# Patient Record
Sex: MALE | HISPANIC OR LATINO | Employment: OTHER | URBAN - METROPOLITAN AREA
[De-identification: names, ages, dates, MRNs, and addresses within clinical notes are randomized per-mention and may not be internally consistent; named-entity substitution may affect disease eponyms.]

---

## 2021-09-20 PROBLEM — E78.5 HYPERLIPIDEMIA ASSOCIATED WITH TYPE 2 DIABETES MELLITUS (HCC): Status: ACTIVE | Noted: 2021-09-20

## 2021-09-20 PROBLEM — H35.61 RETINAL HEMORRHAGE OF RIGHT EYE: Status: ACTIVE | Noted: 2021-09-20

## 2021-09-20 PROBLEM — E11.69 HYPERLIPIDEMIA ASSOCIATED WITH TYPE 2 DIABETES MELLITUS (HCC): Status: ACTIVE | Noted: 2021-09-20

## 2022-02-02 ENCOUNTER — TELEPHONE (OUTPATIENT)
Dept: VASCULAR LAB | Facility: MEDICAL CENTER | Age: 57
End: 2022-02-02

## 2022-02-02 NOTE — TELEPHONE ENCOUNTER
[facility]    Received anticoag referral for warfarin due to hx of left arm arteriovenous thrombosis from ROD Meade on 1/31/22    Called pt to schedule appt to establish care - no answer. LVM.    Insurance: [facility]  PCP: Non-[facility]  Locations to be seen: [facility]Pharmacotherapy [facility] at [phone], fax [phone]    Di TranD, BCACP

## 2022-02-03 ENCOUNTER — DOCUMENTATION (OUTPATIENT)
Dept: VASCULAR LAB | Facility: MEDICAL CENTER | Age: 57
End: 2022-02-03

## 2022-02-03 NOTE — PROGRESS NOTES
[facility]     Received anticoag referral for warfarin due to hx of left arm arteriovenous thrombosis from ROD Meade on 1/31/22     Called pt to schedule appt to establish care - no answer. Left second voicemail.     Insurance: [facility]  PCP: Non-[facility]  Locations to be seen: [facility]tico[facility]Pharmacotherapy Clinic at [phone], fax [phone]    Raffi Cassidy, DiD

## 2022-02-04 ENCOUNTER — TELEPHONE (OUTPATIENT)
Dept: VASCULAR LAB | Facility: MEDICAL CENTER | Age: 57
End: 2022-02-04

## 2022-02-04 NOTE — TELEPHONE ENCOUNTER
[facility]     Received anticoag referral for warfarin due to hx of left arm arteriovenous thrombosis from ROD Meade on 1/31/22     Called pt to schedule appt to establish care - no answer. Left 3rd voicemail.    Sent letter.     Insurance: [facility]  PCP: Non-[facility]  Locations to be seen: [facility] Anticoagulation/Pharmacotherapy Clinic at [phone], fax [phone]    Joe Phillip, DiD, BCACP

## 2022-02-04 NOTE — LETTER
Zack Gale  [address] [place] [address]    02/04/22      Dear Zack Gale,    We have been unsuccessful in our attempts to contact you regarding your Anticoagulation Service referral.  Warfarin is a potent blood-thinning agent that requires frequent monitoring to measure its level in the blood.  If your level becomes too high, you could develop serious, sometimes life-threatening bleeding problems.  If your level becomes too low, life-threatening blood clots or stroke could result.    To monitor your warfarin effectively, we need to be able to communicate with you.  This is a requirement to be followed by our Service.  If we are unable to contact you through repeated occasions, you are at risk of being discharged from the Anticoagulation Service.     It is extremely important that you have your lab work drawn as soon as possible.  Alternatively, you may schedule an appointment for a fingerstick INR at our clinic.  We are open Monday-Friday 8 am until 5 pm.  You may reach our Service at [phone].        Sincerely,           Patrick Grover, PharmD, [facility]  Clinic Supervisor  [facility]  Outpatient Anticoagulation Service

## 2022-02-07 ENCOUNTER — TELEPHONE (OUTPATIENT)
Dept: VASCULAR LAB | Facility: MEDICAL CENTER | Age: 57
End: 2022-02-07

## 2022-02-07 NOTE — TELEPHONE ENCOUNTER
[facility]     Received anticoag referral for warfarin due to hx of left arm arteriovenous thrombosis from ROD Meade on 1/31/22     4th call   LM on mobile phone  Home phone not in service  LM with emergency contact Neryda     Insurance: [facility]  PCP: Non-[facility]  Locations to be seen: [facility] Anticoagulation/Pharmacotherapy Clinic at [phone], fax [phone]     Frances Heredia, PharmD

## 2022-02-14 ENCOUNTER — TELEPHONE (OUTPATIENT)
Dept: VASCULAR LAB | Facility: MEDICAL CENTER | Age: 57
End: 2022-02-14

## 2022-02-14 NOTE — TELEPHONE ENCOUNTER
[facility]     Received anticoag referral for warfarin due to hx of left arm arteriovenous thrombosis from ROD Meade on 1/31/22     5th call   LM on mobile phone     Insurance: [facility]  PCP: Non-[facility]  Locations to be seen: [facility] Anticoagulation/Pharmacotherapy Clinic at [phone], fax [phone]     Frances Heredia, PharmD

## 2022-02-22 ENCOUNTER — TELEPHONE (OUTPATIENT)
Dept: VASCULAR LAB | Facility: MEDICAL CENTER | Age: 57
End: 2022-02-22
Payer: COMMERCIAL

## 2022-02-22 NOTE — TELEPHONE ENCOUNTER
[facility] Vascular Health and Pharmacotherapy Programs     Received anticoag referral for warfarin due to hx of left arm arteriovenous thrombosis from ROD Meade on 1/31/22     6th call   LM on mobile phone  Will send 2nd letter and await pt contact     Insurance: [facility]  PCP: Non-[facility]  Locations to be seen: [facility] Anticoagulation/Pharmacotherapy Clinic at [phone], fax [phone]     Frances Heredia, PharmD    CC BEBETO Meade.

## 2022-02-22 NOTE — LETTER
Zack Gale  [address] [address]      Dear Zack Gale ,    We have been unsuccessful in our attempts to contact you regarding your Anticoagulation Service referral.  Anticoagulants are medications that can cause potential harmful side effects when not monitored properly. Without proper monitoring there is potential for serious, sometimes life-threatening bleeding problems or life-threatening blood clots or stroke could result.    Please contact our clinic so we may assist you.  We are open Monday-Friday 8 am until 5 pm.  You may reach our Service at [phone].    To monitor your anticoagulant effectively, we need to be able to communicate with you.  This is a requirement to be followed by our Service.  Please contact the clinic as soon as possible to establish care and provide your current contact information.  Thank you.        Sincerely,        Patrick SevillaD, [facility]  Pharmacy Clinical Supervisor  [facility]  Outpatient Anticoagulation Service

## 2022-04-29 PROBLEM — M19.041 OSTEOARTHRITIS OF RIGHT INDEX FINGER: Status: ACTIVE | Noted: 2022-04-29

## 2022-04-29 PROBLEM — R53.83 OTHER FATIGUE: Status: ACTIVE | Noted: 2022-04-29

## 2023-12-01 ENCOUNTER — OFFICE VISIT (OUTPATIENT)
Dept: URGENT CARE | Facility: CLINIC | Age: 58
End: 2023-12-01
Payer: COMMERCIAL

## 2023-12-01 VITALS
WEIGHT: 130 LBS | SYSTOLIC BLOOD PRESSURE: 128 MMHG | HEART RATE: 69 BPM | BODY MASS INDEX: 19.7 KG/M2 | DIASTOLIC BLOOD PRESSURE: 80 MMHG | OXYGEN SATURATION: 97 % | TEMPERATURE: 97.7 F | HEIGHT: 68 IN | RESPIRATION RATE: 15 BRPM

## 2023-12-01 DIAGNOSIS — H10.33 ACUTE BACTERIAL CONJUNCTIVITIS OF BOTH EYES: ICD-10-CM

## 2023-12-01 PROCEDURE — 3079F DIAST BP 80-89 MM HG: CPT | Performed by: PHYSICIAN ASSISTANT

## 2023-12-01 PROCEDURE — 3074F SYST BP LT 130 MM HG: CPT | Performed by: PHYSICIAN ASSISTANT

## 2023-12-01 PROCEDURE — 99203 OFFICE O/P NEW LOW 30 MIN: CPT | Performed by: PHYSICIAN ASSISTANT

## 2023-12-01 RX ORDER — OFLOXACIN 3 MG/ML
1 SOLUTION/ DROPS OPHTHALMIC 4 TIMES DAILY
Qty: 5 ML | Refills: 0 | Status: SHIPPED | OUTPATIENT
Start: 2023-12-01 | End: 2023-12-07

## 2023-12-01 ASSESSMENT — ENCOUNTER SYMPTOMS
EYE DISCHARGE: 1
SINUS PAIN: 0
EYE PAIN: 0
PHOTOPHOBIA: 0
SORE THROAT: 0
FEVER: 0
BLURRED VISION: 0
EYE REDNESS: 1
VOMITING: 0
DOUBLE VISION: 0
HEADACHES: 0
DIZZINESS: 0
COUGH: 0
CHILLS: 0
NAUSEA: 0

## 2023-12-01 ASSESSMENT — FIBROSIS 4 INDEX: FIB4 SCORE: 1.85

## 2023-12-01 NOTE — PROGRESS NOTES
Subjective:     CHIEF COMPLAINT  Chief Complaint   Patient presents with    Conjunctivitis       HPI  Zack Gale is a very pleasant 58 y.o. male who presents to the clinic with bilateral eye redness, irritation and discharge x2 days.  Patient states in the morning he wakes up and his eyes are crusted shut.  Describes purulent discharge and drainage.  Eyes have a gritty sensation.  Denies any eye pain or visual change.  No photophobia.  No recent illness.  No periorbital swelling, redness or tenderness.  No contact lens use.    REVIEW OF SYSTEMS  Review of Systems   Constitutional:  Negative for chills, fever and malaise/fatigue.   HENT:  Negative for congestion, sinus pain and sore throat.    Eyes:  Positive for discharge and redness. Negative for blurred vision, double vision, photophobia and pain.   Respiratory:  Negative for cough.    Gastrointestinal:  Negative for nausea and vomiting.   Skin:  Negative for rash.   Neurological:  Negative for dizziness and headaches.       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    End-stage renal disease on hemodialysis ([facility]) 11/01/2014    S/P CABG x 3 10/31/2014    CAD (coronary artery disease) 10/24/2014    Non-ST elevation myocardial infarction (NSTEMI), initial care episode ([facility]) 10/24/2014    Chronic diarrhea 10/22/2014    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 (Physician Assistant) on 12/01/23 at 1219  Med List Status: <None>     Medication Last Dose Status   atorvastatin (LIPITOR) 20 MG Tab Taking Active   famotidine (PEPCID) 20 MG Tab Taking Active   hydrALAZINE (APRESOLINE) 50 MG Tab Taking Active   insulin glargine (LANTUS) 100 UNIT/ML Solution Taking Active   NIFEdipine (ADALAT CC) 90 MG CR tablet Taking Active   warfarin (COUMADIN) 2 MG Tab Taking Active                    SOCIAL HISTORY  Social History     Tobacco Use    Smoking status: Never    Smokeless tobacco: Never   Substance and Sexual Activity    Alcohol use: Yes     Comment: occassionally    Drug use: No    Sexual activity: Not on file       FAMILY HISTORY  History reviewed. No pertinent family history.       Objective:     VITAL SIGNS: /80 (BP Location: Right arm, Patient Position: Sitting, BP Cuff Size: Adult long)   Pulse 69   Temp 36.5 °C (97.7 °F) (Temporal)   Resp 15   Ht 1.727 m (5' 8\")   Wt 59 kg (130 lb)   SpO2 97%   BMI 19.77 kg/m²     PHYSICAL EXAM  Physical Exam  Constitutional:       Appearance: Normal appearance.   HENT:      Head: Normocephalic and atraumatic.      Right Ear: Tympanic membrane, ear canal and external ear normal.      Left Ear: Tympanic membrane, ear canal and external ear normal.      Nose: Nose normal. No congestion or rhinorrhea.      Mouth/Throat:      Mouth: Mucous membranes are moist.      Pharynx: No oropharyngeal exudate or posterior oropharyngeal erythema.   Eyes:      General:         Right eye: Discharge present.         Left eye: Discharge present.     Extraocular Movements: Extraocular movements intact.      Pupils: Pupils are equal, round, and reactive to light.      Comments: Bilateral conjunctival injected.  Limbus spared.  Purulent discharge in the lid margins.  EOMs full intact and pain-free.  PERRLA.  No " periorbital swelling, redness or tenderness.   Cardiovascular:      Rate and Rhythm: Normal rate and regular rhythm.      Pulses: Normal pulses.      Heart sounds: Normal heart sounds.   Pulmonary:      Effort: Pulmonary effort is normal.      Breath sounds: Normal breath sounds.   Musculoskeletal:      Cervical back: Normal range of motion.   Neurological:      Mental Status: He is alert.         Assessment/Plan:     1. Acute bacterial conjunctivitis of both eyes  - ofloxacin (OCUFLOX) 0.3 % Solution; Administer 1 Drop into both eyes 4 times a day for 7 days.  Dispense: 5 mL; Refill: 0      MDM/Comments:    Warm compresses to affected eye(s) 3 times daily  Hand hygiene discussed  Wash all recently used linens and towels in hot water  Do not touch dropper to eye (s)'    Differential diagnosis, natural history, supportive care, and indications for immediate follow-up discussed. All questions answered. Patient agrees with the plan of care.    Follow-up as needed if symptoms worsen or fail to improve to PCP, Urgent care or Emergency Room.    I have personally reviewed prior external notes and test results pertinent to today's visit.  I have independently reviewed and interpreted all diagnostics ordered during this urgent care acute visit.   Discussed management options (risks,benefits, and alternatives to treatment). Pt expresses understanding and the treatment plan was agreed upon. Questions were encouraged and answered to pt's satisfaction.    Please note that this dictation was created using voice recognition software. I have made a reasonable attempt to correct obvious errors, but I expect that there are errors of grammar and possibly content that I did not discover before finalizing the note.

## 2023-12-07 PROBLEM — M19.041 OSTEOARTHRITIS OF RIGHT INDEX FINGER: Chronic | Status: ACTIVE | Noted: 2022-04-29

## 2023-12-07 PROBLEM — Z79.4 ENCOUNTER FOR LONG-TERM (CURRENT) USE OF INSULIN (HCC): Status: ACTIVE | Noted: 2023-12-07

## 2023-12-07 PROBLEM — I77.0 ARTERIOVENOUS FISTULA, ACQUIRED (HCC): Status: ACTIVE | Noted: 2023-12-07

## 2023-12-07 PROBLEM — E78.5 HYPERLIPIDEMIA ASSOCIATED WITH TYPE 2 DIABETES MELLITUS (HCC): Chronic | Status: ACTIVE | Noted: 2021-09-20

## 2023-12-07 PROBLEM — R68.2 DRY MOUTH: Status: ACTIVE | Noted: 2023-12-07

## 2023-12-07 PROBLEM — E11.3553 STABLE PROLIFERATIVE DIABETIC RETINOPATHY OF BOTH EYES ASSOCIATED WITH TYPE 2 DIABETES MELLITUS (HCC): Chronic | Status: ACTIVE | Noted: 2023-12-07

## 2023-12-07 PROBLEM — E11.3553 STABLE PROLIFERATIVE DIABETIC RETINOPATHY OF BOTH EYES ASSOCIATED WITH TYPE 2 DIABETES MELLITUS (HCC): Status: ACTIVE | Noted: 2023-12-07

## 2023-12-07 PROBLEM — E11.69 HYPERLIPIDEMIA ASSOCIATED WITH TYPE 2 DIABETES MELLITUS (HCC): Chronic | Status: ACTIVE | Noted: 2021-09-20

## 2023-12-07 PROBLEM — G47.01 INSOMNIA DUE TO MEDICAL CONDITION: Status: ACTIVE | Noted: 2023-12-07

## 2024-03-25 ENCOUNTER — APPOINTMENT (OUTPATIENT)
Dept: URGENT CARE | Facility: CLINIC | Age: 59
End: 2024-03-25
Payer: COMMERCIAL

## 2024-03-27 ENCOUNTER — HOSPITAL ENCOUNTER (EMERGENCY)
Facility: MEDICAL CENTER | Age: 59
End: 2024-03-27
Attending: EMERGENCY MEDICINE
Payer: COMMERCIAL

## 2024-03-27 VITALS
WEIGHT: 118.61 LBS | DIASTOLIC BLOOD PRESSURE: 65 MMHG | OXYGEN SATURATION: 98 % | HEIGHT: 68 IN | RESPIRATION RATE: 16 BRPM | HEART RATE: 72 BPM | SYSTOLIC BLOOD PRESSURE: 142 MMHG | TEMPERATURE: 98.8 F | BODY MASS INDEX: 17.98 KG/M2

## 2024-03-27 DIAGNOSIS — H66.002 NON-RECURRENT ACUTE SUPPURATIVE OTITIS MEDIA OF LEFT EAR WITHOUT SPONTANEOUS RUPTURE OF TYMPANIC MEMBRANE: ICD-10-CM

## 2024-03-27 PROCEDURE — 99282 EMERGENCY DEPT VISIT SF MDM: CPT

## 2024-03-27 RX ORDER — CEFDINIR 300 MG/1
300 CAPSULE ORAL 2 TIMES DAILY
Qty: 20 CAPSULE | Refills: 0 | Status: ACTIVE | OUTPATIENT
Start: 2024-03-27 | End: 2024-04-06

## 2024-03-27 ASSESSMENT — PAIN DESCRIPTION - PAIN TYPE: TYPE: ACUTE PAIN

## 2024-03-27 ASSESSMENT — FIBROSIS 4 INDEX: FIB4 SCORE: 1.88

## 2024-03-27 NOTE — ED PROVIDER NOTES
ER Provider Note    Scribed for Arsh Baker M.D. by Loc Paul. 3/27/2024   10:46 AM    Primary Care Provider: RICHIE Meade    CHIEF COMPLAINT  Chief Complaint   Patient presents with    Ear Pain     Left ear, x 1 week    Cough     Denies fevers or other flu like symptoms.      EXTERNAL RECORDS REVIEWED  Outpatient Notes shows that the patient was previously anticoagulated on Warfarin for several years, but stopped in 2022. No recent ER visits.      HPI/ROS  LIMITATION TO HISTORY   Select: : None  OUTSIDE HISTORIAN(S):  None    Zack Gale is a 59 y.o. male who presents to the ED for evaluation of acute left ear pain onset five days ago. Right ear has no pain to it. Patient additionally complains of a cough. Denies any fevers or congestion. No alleviating or exacerbating factors reported. No known drug allergies.      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 No pertinent surgical history.    FAMILY HISTORY  History reviewed. No pertinent family history.    SOCIAL HISTORY   reports that he has never smoked. He has never used smokeless tobacco. He reports current alcohol use. He reports that he does not use drugs.    CURRENT MEDICATIONS  Previous Medications    AMLODIPINE (NORVASC) 10 MG TAB    Take 10 mg by mouth at bedtime.    FAMOTIDINE (PEPCID) 20 MG TAB    Take 1 Tablet by mouth at bedtime.    INSULIN GLARGINE (LANTUS) 100 UNIT/ML SOLUTION    Inject 4 Units under the skin every evening.    LOSARTAN (COZAAR) 50 MG TAB    Take 50 mg by mouth at bedtime.    ROSUVASTATIN (CRESTOR) 20 MG TAB    Take 1 Tablet by mouth every evening.       ALLERGIES  No Known Allergies     PHYSICAL EXAM  BP (!)  "119/34   Pulse 70   Temp 37.1 °C (98.8 °F) (Temporal)   Resp 16   Ht 1.727 m (5' 8\")   Wt 53.8 kg (118 lb 9.7 oz)   SpO2 99%   BMI 18.03 kg/m²      Nursing note and vitals reviewed.  Constitutional: Well-developed and well-nourished. No distress.   HENT: Head is normocephalic and atraumatic. Left TM is erythematous and bulging. Oropharynx is clear and moist without exudate or erythema.   Eyes: Pupils are equal, round, and reactive to light. Conjunctiva are normal.   Cardiovascular: Normal rate and regular rhythm. No murmur heard. Normal radial pulses.  Pulmonary/Chest: Breath sounds normal. No wheezes or rales.   Abdominal: Soft and non-tender. No distention    Musculoskeletal: Extremities exhibit normal range of motion without edema or tenderness.   Neurological: Awake, alert and oriented to person, place, and time. No focal deficits noted.  Skin: Skin is warm and dry. No rash.   Psychiatric: Normal mood and affect. Appropriate for clinical situation      INITIAL ASSESSMENT AND PLAN    10:46 AM - Patient was seen and evaluated at bedside. Patient presents to the ED for an ear pain with cough. After my exam, I discussed with the patient the plan of care, which includes treating the patient with medication for their symptoms. I explained to him that I will send him home with a prescription for antibiotics. Patient understands and verbalizes agreement to plan of care. They had the opportunity to ask questions. No further questions or concerns at this time. I then informed the patient of my plan for discharge, which includes strict return precautions for any new or worsening symptoms. Patient understands and verbalizes agreement to plan of care. Patient is comfortable going home at this time.      ED Observation Status? No; Patient does not meet criteria for ED Observation.        COURSE AND MEDICAL DECISION MAKING    The patient presents today with left ear pain for the past five days. Exam reveals left TM is " erythematous and bulging. I will send the patient home with a prescription for cefdinir.      HTN/IDDM FOLLOW UP:  The patient is referred to a primary physician for blood pressure management, diabetic screening, and for all other preventive health concerns      DISPOSITION AND DISCUSSIONS    I have discussed management of the patient with the following physicians and ITZEL's:  None    Discussion of management with other QHP or appropriate source(s): None     Decision tools and prescription drugs considered including, but not limited to: Antibiotics : Omnicef to help treat his infection .    Patient will be discharged home.    FOLLOW UP:  RICHIE Meade  [address] [address]  [phone]    Schedule an appointment as soon as possible for a visit       [facility], Emergency Dept  [address] [facility] [address] [phone]    If symptoms worsen      OUTPATIENT MEDICATIONS:  New Prescriptions    CEFDINIR (OMNICEF) 300 MG CAP    Take 1 Capsule by mouth 2 times a day for 10 days.     FINAL DIAGNOSIS  1. Non-recurrent acute suppurative otitis media of left ear without spontaneous rupture of tympanic membrane         Loc GRAF (Scraurea), am scribing for, and in the presence of, Arsh Baker M.D..    Electronically signed by: Loc Paul (Isaiah), 3/27/2024    Arsh GRAF M.D. personally performed the services described in this documentation, as scribed by Loc Paul in my presence, and it is both accurate and complete.      The note accurately reflects work and decisions made by me.  Arsh Baker M.D.  3/27/2024  4:16 PM

## 2024-03-27 NOTE — ED TRIAGE NOTES
.  Chief Complaint   Patient presents with    Ear Pain     Left ear, x 1 week    Cough     Denies fevers or other flu like symptoms.       Pt ambulate to triage with above complaints.   Pt educated on triage process and returned to [facility].

## 2024-03-27 NOTE — ED NOTES
Pt discharged to home. Pt was given follow up instructions and prescriptions for cefdinir. Pt verbalized understanding of all instructions for discharge and is ambulatory out of ED with steady gait. AOx4

## 2024-08-29 ENCOUNTER — HOSPITAL ENCOUNTER (INPATIENT)
Facility: MEDICAL CENTER | Age: 59
End: 2024-08-29
Attending: STUDENT IN AN ORGANIZED HEALTH CARE EDUCATION/TRAINING PROGRAM | Admitting: STUDENT IN AN ORGANIZED HEALTH CARE EDUCATION/TRAINING PROGRAM
Payer: COMMERCIAL

## 2024-08-29 DIAGNOSIS — J96.01 ACUTE HYPOXEMIC RESPIRATORY FAILURE DUE TO COVID-19 (HCC): ICD-10-CM

## 2024-08-29 DIAGNOSIS — E87.5 HYPERKALEMIA: ICD-10-CM

## 2024-08-29 DIAGNOSIS — R00.0 TACHYCARDIA: ICD-10-CM

## 2024-08-29 DIAGNOSIS — U07.1 COVID: ICD-10-CM

## 2024-08-29 DIAGNOSIS — U07.1 ACUTE HYPOXEMIC RESPIRATORY FAILURE DUE TO COVID-19 (HCC): ICD-10-CM

## 2024-08-29 PROCEDURE — 99285 EMERGENCY DEPT VISIT HI MDM: CPT

## 2024-08-29 PROCEDURE — 0241U HCHG SARS-COV-2 COVID-19 NFCT DS RESP RNA 4 TRGT ED POC: CPT

## 2024-08-29 ASSESSMENT — PAIN DESCRIPTION - PAIN TYPE: TYPE: ACUTE PAIN

## 2024-08-30 ENCOUNTER — APPOINTMENT (OUTPATIENT)
Dept: RADIOLOGY | Facility: MEDICAL CENTER | Age: 59
DRG: 640 | End: 2024-08-30
Attending: STUDENT IN AN ORGANIZED HEALTH CARE EDUCATION/TRAINING PROGRAM
Payer: COMMERCIAL

## 2024-08-30 PROBLEM — U07.1 COVID-19 VIRUS INFECTION: Status: ACTIVE | Noted: 2024-08-30

## 2024-08-30 PROBLEM — E87.5 HYPERKALEMIA: Status: ACTIVE | Noted: 2024-08-30

## 2024-08-30 LAB
ALBUMIN SERPL BCP-MCNC: 4.4 G/DL (ref 3.2–4.9)
ALBUMIN/GLOB SERPL: 1.2 G/DL
ALP SERPL-CCNC: 175 U/L (ref 30–99)
ALT SERPL-CCNC: 10 U/L (ref 2–50)
ANION GAP SERPL CALC-SCNC: 19 MMOL/L (ref 7–16)
ANION GAP SERPL CALC-SCNC: 20 MMOL/L (ref 7–16)
ANION GAP SERPL CALC-SCNC: 21 MMOL/L (ref 7–16)
AST SERPL-CCNC: 19 U/L (ref 12–45)
BASOPHILS # BLD AUTO: 0.8 % (ref 0–1.8)
BASOPHILS # BLD: 0.05 K/UL (ref 0–0.12)
BILIRUB SERPL-MCNC: 0.5 MG/DL (ref 0.1–1.5)
BUN SERPL-MCNC: 53 MG/DL (ref 8–22)
BUN SERPL-MCNC: 54 MG/DL (ref 8–22)
BUN SERPL-MCNC: 58 MG/DL (ref 8–22)
CALCIUM ALBUM COR SERPL-MCNC: 8.7 MG/DL (ref 8.5–10.5)
CALCIUM SERPL-MCNC: 8.5 MG/DL (ref 8.5–10.5)
CALCIUM SERPL-MCNC: 9 MG/DL (ref 8.5–10.5)
CALCIUM SERPL-MCNC: 9.1 MG/DL (ref 8.5–10.5)
CHLORIDE SERPL-SCNC: 90 MMOL/L (ref 96–112)
CHLORIDE SERPL-SCNC: 91 MMOL/L (ref 96–112)
CHLORIDE SERPL-SCNC: 94 MMOL/L (ref 96–112)
CO2 SERPL-SCNC: 20 MMOL/L (ref 20–33)
CO2 SERPL-SCNC: 21 MMOL/L (ref 20–33)
CO2 SERPL-SCNC: 23 MMOL/L (ref 20–33)
CREAT SERPL-MCNC: 9.45 MG/DL (ref 0.5–1.4)
CREAT SERPL-MCNC: 9.77 MG/DL (ref 0.5–1.4)
CREAT SERPL-MCNC: 9.89 MG/DL (ref 0.5–1.4)
EKG IMPRESSION: NORMAL
EOSINOPHIL # BLD AUTO: 0.01 K/UL (ref 0–0.51)
EOSINOPHIL NFR BLD: 0.2 % (ref 0–6.9)
ERYTHROCYTE [DISTWIDTH] IN BLOOD BY AUTOMATED COUNT: 51.4 FL (ref 35.9–50)
FLUAV RNA SPEC QL NAA+PROBE: NEGATIVE
FLUBV RNA SPEC QL NAA+PROBE: NEGATIVE
GFR SERPLBLD CREATININE-BSD FMLA CKD-EPI: 6 ML/MIN/1.73 M 2
GLOBULIN SER CALC-MCNC: 3.6 G/DL (ref 1.9–3.5)
GLUCOSE BLD STRIP.AUTO-MCNC: 154 MG/DL (ref 65–99)
GLUCOSE BLD STRIP.AUTO-MCNC: 324 MG/DL (ref 65–99)
GLUCOSE BLD STRIP.AUTO-MCNC: 58 MG/DL (ref 65–99)
GLUCOSE BLD STRIP.AUTO-MCNC: 65 MG/DL (ref 65–99)
GLUCOSE BLD STRIP.AUTO-MCNC: 91 MG/DL (ref 65–99)
GLUCOSE SERPL-MCNC: 155 MG/DL (ref 65–99)
GLUCOSE SERPL-MCNC: 51 MG/DL (ref 65–99)
GLUCOSE SERPL-MCNC: 78 MG/DL (ref 65–99)
HBV CORE AB SERPL QL IA: NONREACTIVE
HBV CORE IGM SER QL: NORMAL
HBV SURFACE AB SERPL IA-ACNC: 432 MIU/ML (ref 0–10)
HBV SURFACE AG SER QL: NORMAL
HCT VFR BLD AUTO: 37.7 % (ref 42–52)
HGB BLD-MCNC: 12.3 G/DL (ref 14–18)
IMM GRANULOCYTES # BLD AUTO: 0.02 K/UL (ref 0–0.11)
IMM GRANULOCYTES NFR BLD AUTO: 0.3 % (ref 0–0.9)
LYMPHOCYTES # BLD AUTO: 1.32 K/UL (ref 1–4.8)
LYMPHOCYTES NFR BLD: 20.4 % (ref 22–41)
MAGNESIUM SERPL-MCNC: 2.9 MG/DL (ref 1.5–2.5)
MCH RBC QN AUTO: 28.5 PG (ref 27–33)
MCHC RBC AUTO-ENTMCNC: 32.6 G/DL (ref 32.3–36.5)
MCV RBC AUTO: 87.3 FL (ref 81.4–97.8)
MONOCYTES # BLD AUTO: 0.75 K/UL (ref 0–0.85)
MONOCYTES NFR BLD AUTO: 11.6 % (ref 0–13.4)
NEUTROPHILS # BLD AUTO: 4.33 K/UL (ref 1.82–7.42)
NEUTROPHILS NFR BLD: 66.7 % (ref 44–72)
NRBC # BLD AUTO: 0 K/UL
NRBC BLD-RTO: 0 /100 WBC (ref 0–0.2)
PHOSPHATE SERPL-MCNC: 5.1 MG/DL (ref 2.5–4.5)
PLATELET # BLD AUTO: 155 K/UL (ref 164–446)
PMV BLD AUTO: 10.3 FL (ref 9–12.9)
POTASSIUM SERPL-SCNC: 4.4 MMOL/L (ref 3.6–5.5)
POTASSIUM SERPL-SCNC: 4.7 MMOL/L (ref 3.6–5.5)
POTASSIUM SERPL-SCNC: 6.1 MMOL/L (ref 3.6–5.5)
PROT SERPL-MCNC: 8 G/DL (ref 6–8.2)
RBC # BLD AUTO: 4.32 M/UL (ref 4.7–6.1)
RSV RNA SPEC QL NAA+PROBE: NEGATIVE
SARS-COV-2 RNA RESP QL NAA+PROBE: DETECTED
SODIUM SERPL-SCNC: 132 MMOL/L (ref 135–145)
SODIUM SERPL-SCNC: 133 MMOL/L (ref 135–145)
SODIUM SERPL-SCNC: 134 MMOL/L (ref 135–145)
WBC # BLD AUTO: 6.5 K/UL (ref 4.8–10.8)

## 2024-08-30 PROCEDURE — 80053 COMPREHEN METABOLIC PANEL: CPT

## 2024-08-30 PROCEDURE — 700111 HCHG RX REV CODE 636 W/ 250 OVERRIDE (IP): Performed by: STUDENT IN AN ORGANIZED HEALTH CARE EDUCATION/TRAINING PROGRAM

## 2024-08-30 PROCEDURE — 700102 HCHG RX REV CODE 250 W/ 637 OVERRIDE(OP): Mod: UD | Performed by: STUDENT IN AN ORGANIZED HEALTH CARE EDUCATION/TRAINING PROGRAM

## 2024-08-30 PROCEDURE — A9270 NON-COVERED ITEM OR SERVICE: HCPCS | Performed by: STUDENT IN AN ORGANIZED HEALTH CARE EDUCATION/TRAINING PROGRAM

## 2024-08-30 PROCEDURE — 87340 HEPATITIS B SURFACE AG IA: CPT

## 2024-08-30 PROCEDURE — 36415 COLL VENOUS BLD VENIPUNCTURE: CPT

## 2024-08-30 PROCEDURE — 90935 HEMODIALYSIS ONE EVALUATION: CPT

## 2024-08-30 PROCEDURE — 770020 HCHG ROOM/CARE - TELE (206)

## 2024-08-30 PROCEDURE — 700111 HCHG RX REV CODE 636 W/ 250 OVERRIDE (IP): Mod: JZ,JG,UD | Performed by: STUDENT IN AN ORGANIZED HEALTH CARE EDUCATION/TRAINING PROGRAM

## 2024-08-30 PROCEDURE — 96372 THER/PROPH/DIAG INJ SC/IM: CPT

## 2024-08-30 PROCEDURE — 85025 COMPLETE CBC W/AUTO DIFF WBC: CPT

## 2024-08-30 PROCEDURE — 99223 1ST HOSP IP/OBS HIGH 75: CPT | Mod: AI | Performed by: STUDENT IN AN ORGANIZED HEALTH CARE EDUCATION/TRAINING PROGRAM

## 2024-08-30 PROCEDURE — 74176 CT ABD & PELVIS W/O CONTRAST: CPT

## 2024-08-30 PROCEDURE — 700111 HCHG RX REV CODE 636 W/ 250 OVERRIDE (IP): Performed by: INTERNAL MEDICINE

## 2024-08-30 PROCEDURE — 86704 HEP B CORE ANTIBODY TOTAL: CPT

## 2024-08-30 PROCEDURE — 84100 ASSAY OF PHOSPHORUS: CPT

## 2024-08-30 PROCEDURE — 700102 HCHG RX REV CODE 250 W/ 637 OVERRIDE(OP): Performed by: STUDENT IN AN ORGANIZED HEALTH CARE EDUCATION/TRAINING PROGRAM

## 2024-08-30 PROCEDURE — 80048 BASIC METABOLIC PNL TOTAL CA: CPT | Mod: 91

## 2024-08-30 PROCEDURE — 86705 HEP B CORE ANTIBODY IGM: CPT

## 2024-08-30 PROCEDURE — 700111 HCHG RX REV CODE 636 W/ 250 OVERRIDE (IP)

## 2024-08-30 PROCEDURE — 96375 TX/PRO/DX INJ NEW DRUG ADDON: CPT

## 2024-08-30 PROCEDURE — 83735 ASSAY OF MAGNESIUM: CPT

## 2024-08-30 PROCEDURE — 96365 THER/PROPH/DIAG IV INF INIT: CPT

## 2024-08-30 PROCEDURE — 93005 ELECTROCARDIOGRAM TRACING: CPT | Performed by: STUDENT IN AN ORGANIZED HEALTH CARE EDUCATION/TRAINING PROGRAM

## 2024-08-30 PROCEDURE — A9270 NON-COVERED ITEM OR SERVICE: HCPCS | Mod: UD | Performed by: STUDENT IN AN ORGANIZED HEALTH CARE EDUCATION/TRAINING PROGRAM

## 2024-08-30 PROCEDURE — 99223 1ST HOSP IP/OBS HIGH 75: CPT | Performed by: INTERNAL MEDICINE

## 2024-08-30 PROCEDURE — 700101 HCHG RX REV CODE 250: Mod: UD | Performed by: STUDENT IN AN ORGANIZED HEALTH CARE EDUCATION/TRAINING PROGRAM

## 2024-08-30 PROCEDURE — 94644 CONT INHLJ TX 1ST HOUR: CPT

## 2024-08-30 PROCEDURE — 82962 GLUCOSE BLOOD TEST: CPT | Mod: 91

## 2024-08-30 PROCEDURE — 86706 HEP B SURFACE ANTIBODY: CPT

## 2024-08-30 RX ORDER — ACETAMINOPHEN 325 MG/1
650 TABLET ORAL EVERY 6 HOURS PRN
Status: DISCONTINUED | OUTPATIENT
Start: 2024-08-30 | End: 2024-09-01 | Stop reason: HOSPADM

## 2024-08-30 RX ORDER — DEXTROSE MONOHYDRATE 25 G/50ML
25 INJECTION, SOLUTION INTRAVENOUS ONCE
Status: COMPLETED | OUTPATIENT
Start: 2024-08-30 | End: 2024-08-30

## 2024-08-30 RX ORDER — ASPIRIN 81 MG/1
81 TABLET ORAL DAILY
Status: DISCONTINUED | OUTPATIENT
Start: 2024-08-30 | End: 2024-09-01 | Stop reason: HOSPADM

## 2024-08-30 RX ORDER — ONDANSETRON 4 MG/1
4 TABLET, ORALLY DISINTEGRATING ORAL ONCE
Status: COMPLETED | OUTPATIENT
Start: 2024-08-30 | End: 2024-08-30

## 2024-08-30 RX ORDER — NIFEDIPINE 90 MG/1
90 TABLET, EXTENDED RELEASE ORAL DAILY
COMMUNITY

## 2024-08-30 RX ORDER — HEPARIN SODIUM 5000 [USP'U]/ML
5000 INJECTION, SOLUTION INTRAVENOUS; SUBCUTANEOUS EVERY 8 HOURS
Status: DISCONTINUED | OUTPATIENT
Start: 2024-08-30 | End: 2024-08-31

## 2024-08-30 RX ORDER — AMLODIPINE BESYLATE 5 MG/1
10 TABLET ORAL ONCE
Status: COMPLETED | OUTPATIENT
Start: 2024-08-30 | End: 2024-08-30

## 2024-08-30 RX ORDER — CALCIUM GLUCONATE 20 MG/ML
2 INJECTION, SOLUTION INTRAVENOUS ONCE
Status: COMPLETED | OUTPATIENT
Start: 2024-08-30 | End: 2024-08-30

## 2024-08-30 RX ORDER — LABETALOL HYDROCHLORIDE 5 MG/ML
10 INJECTION, SOLUTION INTRAVENOUS EVERY 4 HOURS PRN
Status: DISCONTINUED | OUTPATIENT
Start: 2024-08-30 | End: 2024-09-01 | Stop reason: HOSPADM

## 2024-08-30 RX ORDER — ACETAMINOPHEN 325 MG/1
975 TABLET ORAL ONCE
Status: COMPLETED | OUTPATIENT
Start: 2024-08-30 | End: 2024-08-30

## 2024-08-30 RX ORDER — NIFEDIPINE 30 MG/1
90 TABLET, EXTENDED RELEASE ORAL DAILY
Status: DISCONTINUED | OUTPATIENT
Start: 2024-08-30 | End: 2024-09-01 | Stop reason: HOSPADM

## 2024-08-30 RX ORDER — DEXTROSE MONOHYDRATE 25 G/50ML
50 INJECTION, SOLUTION INTRAVENOUS PRN
Status: DISCONTINUED | OUTPATIENT
Start: 2024-08-30 | End: 2024-09-01 | Stop reason: HOSPADM

## 2024-08-30 RX ORDER — HEPARIN SODIUM 1000 [USP'U]/ML
500 INJECTION, SOLUTION INTRAVENOUS; SUBCUTANEOUS
Status: DISCONTINUED | OUTPATIENT
Start: 2024-08-30 | End: 2024-09-01 | Stop reason: HOSPADM

## 2024-08-30 RX ORDER — LOSARTAN POTASSIUM 50 MG/1
50 TABLET ORAL ONCE
Status: COMPLETED | OUTPATIENT
Start: 2024-08-30 | End: 2024-08-30

## 2024-08-30 RX ORDER — SODIUM CHLORIDE 9 MG/ML
100 INJECTION, SOLUTION INTRAVENOUS
Status: DISCONTINUED | OUTPATIENT
Start: 2024-08-30 | End: 2024-09-01 | Stop reason: HOSPADM

## 2024-08-30 RX ORDER — HEPARIN SODIUM 1000 [USP'U]/ML
INJECTION, SOLUTION INTRAVENOUS; SUBCUTANEOUS
Status: COMPLETED
Start: 2024-08-30 | End: 2024-08-30

## 2024-08-30 RX ADMIN — NIFEDIPINE 90 MG: 30 TABLET, EXTENDED RELEASE ORAL at 21:13

## 2024-08-30 RX ADMIN — SODIUM ZIRCONIUM CYCLOSILICATE 10 G: 10 POWDER, FOR SUSPENSION ORAL at 04:40

## 2024-08-30 RX ADMIN — CALCIUM GLUCONATE 2 G: 20 INJECTION, SOLUTION INTRAVENOUS at 02:40

## 2024-08-30 RX ADMIN — Medication 15 MG/HR: at 02:31

## 2024-08-30 RX ADMIN — HEPARIN SODIUM 5000 UNITS: 5000 INJECTION, SOLUTION INTRAVENOUS; SUBCUTANEOUS at 06:01

## 2024-08-30 RX ADMIN — HEPARIN SODIUM 500 UNITS: 1000 INJECTION, SOLUTION INTRAVENOUS; SUBCUTANEOUS at 11:25

## 2024-08-30 RX ADMIN — ONDANSETRON 4 MG: 4 TABLET, ORALLY DISINTEGRATING ORAL at 00:43

## 2024-08-30 RX ADMIN — ASPIRIN 81 MG: 81 TABLET, COATED ORAL at 06:01

## 2024-08-30 RX ADMIN — ACETAMINOPHEN 975 MG: 325 TABLET ORAL at 00:43

## 2024-08-30 RX ADMIN — HEPARIN SODIUM 5000 UNITS: 5000 INJECTION, SOLUTION INTRAVENOUS; SUBCUTANEOUS at 16:01

## 2024-08-30 RX ADMIN — LOSARTAN POTASSIUM 50 MG: 50 TABLET, FILM COATED ORAL at 02:01

## 2024-08-30 RX ADMIN — AMLODIPINE BESYLATE 10 MG: 5 TABLET ORAL at 02:01

## 2024-08-30 RX ADMIN — DEXTROSE MONOHYDRATE 25 G: 25 INJECTION, SOLUTION INTRAVENOUS at 02:31

## 2024-08-30 RX ADMIN — HEPARIN SODIUM 500 UNITS: 1000 INJECTION, SOLUTION INTRAVENOUS; SUBCUTANEOUS at 11:26

## 2024-08-30 RX ADMIN — INSULIN HUMAN 10 UNITS: 100 INJECTION, SOLUTION PARENTERAL at 02:36

## 2024-08-30 RX ADMIN — HEPARIN SODIUM 5000 UNITS: 5000 INJECTION, SOLUTION INTRAVENOUS; SUBCUTANEOUS at 21:13

## 2024-08-30 SDOH — ECONOMIC STABILITY: TRANSPORTATION INSECURITY
IN THE PAST 12 MONTHS, HAS LACK OF RELIABLE TRANSPORTATION KEPT YOU FROM MEDICAL APPOINTMENTS, MEETINGS, WORK OR FROM GETTING THINGS NEEDED FOR DAILY LIVING?: NO

## 2024-08-30 SDOH — ECONOMIC STABILITY: TRANSPORTATION INSECURITY
IN THE PAST 12 MONTHS, HAS THE LACK OF TRANSPORTATION KEPT YOU FROM MEDICAL APPOINTMENTS OR FROM GETTING MEDICATIONS?: NO

## 2024-08-30 ASSESSMENT — ENCOUNTER SYMPTOMS
MYALGIAS: 1
SHORTNESS OF BREATH: 0
DIARRHEA: 0
CHILLS: 1
ABDOMINAL PAIN: 0
VOMITING: 0
FEVER: 1
COUGH: 0
NAUSEA: 0

## 2024-08-30 ASSESSMENT — LIFESTYLE VARIABLES
HAVE PEOPLE ANNOYED YOU BY CRITICIZING YOUR DRINKING: NO
HOW MANY TIMES IN THE PAST YEAR HAVE YOU HAD 5 OR MORE DRINKS IN A DAY: 0
ALCOHOL_USE: NO
DOES PATIENT WANT TO STOP DRINKING: CANNOT ASSESS
TOTAL SCORE: 0
TOTAL SCORE: 0
EVER HAD A DRINK FIRST THING IN THE MORNING TO STEADY YOUR NERVES TO GET RID OF A HANGOVER: NO
CONSUMPTION TOTAL: NEGATIVE
TOTAL SCORE: 0
AVERAGE NUMBER OF DAYS PER WEEK YOU HAVE A DRINK CONTAINING ALCOHOL: 0
ON A TYPICAL DAY WHEN YOU DRINK ALCOHOL HOW MANY DRINKS DO YOU HAVE: 0
HAVE YOU EVER FELT YOU SHOULD CUT DOWN ON YOUR DRINKING: NO
EVER FELT BAD OR GUILTY ABOUT YOUR DRINKING: NO

## 2024-08-30 ASSESSMENT — SOCIAL DETERMINANTS OF HEALTH (SDOH)
WITHIN THE LAST YEAR, HAVE TO BEEN RAPED OR FORCED TO HAVE ANY KIND OF SEXUAL ACTIVITY BY YOUR PARTNER OR EX-PARTNER?: NO
WITHIN THE PAST 12 MONTHS, YOU WORRIED THAT YOUR FOOD WOULD RUN OUT BEFORE YOU GOT THE MONEY TO BUY MORE: NEVER TRUE
WITHIN THE PAST 12 MONTHS, THE FOOD YOU BOUGHT JUST DIDN'T LAST AND YOU DIDN'T HAVE MONEY TO GET MORE: NEVER TRUE
IN THE PAST 12 MONTHS, HAS THE ELECTRIC, GAS, OIL, OR WATER COMPANY THREATENED TO SHUT OFF SERVICE IN YOUR HOME?: NO
WITHIN THE LAST YEAR, HAVE YOU BEEN HUMILIATED OR EMOTIONALLY ABUSED IN OTHER WAYS BY YOUR PARTNER OR EX-PARTNER?: NO
WITHIN THE LAST YEAR, HAVE YOU BEEN KICKED, HIT, SLAPPED, OR OTHERWISE PHYSICALLY HURT BY YOUR PARTNER OR EX-PARTNER?: NO
WITHIN THE LAST YEAR, HAVE YOU BEEN AFRAID OF YOUR PARTNER OR EX-PARTNER?: NO

## 2024-08-30 ASSESSMENT — COGNITIVE AND FUNCTIONAL STATUS - GENERAL
SUGGESTED CMS G CODE MODIFIER MOBILITY: CJ
DAILY ACTIVITIY SCORE: 21
MOBILITY SCORE: 22
DRESSING REGULAR LOWER BODY CLOTHING: A LITTLE
TOILETING: A LITTLE
WALKING IN HOSPITAL ROOM: A LITTLE
SUGGESTED CMS G CODE MODIFIER DAILY ACTIVITY: CJ
CLIMB 3 TO 5 STEPS WITH RAILING: A LITTLE
PERSONAL GROOMING: A LITTLE

## 2024-08-30 ASSESSMENT — PATIENT HEALTH QUESTIONNAIRE - PHQ9
SUM OF ALL RESPONSES TO PHQ9 QUESTIONS 1 AND 2: 1
2. FEELING DOWN, DEPRESSED, IRRITABLE, OR HOPELESS: SEVERAL DAYS
1. LITTLE INTEREST OR PLEASURE IN DOING THINGS: NOT AT ALL

## 2024-08-30 ASSESSMENT — PAIN DESCRIPTION - PAIN TYPE: TYPE: ACUTE PAIN

## 2024-08-30 NOTE — CARE PLAN
The patient is Stable - Low risk of patient condition declining or worsening    Shift Goals  Clinical Goals: dialysis and safety  Patient Goals: rest and comfort  Family Goals: ASH    Progress made toward(s) clinical / shift goals:      Problem: Pain - Standard  Goal: Alleviation of pain or a reduction in pain to the patient’s comfort goal  8/30/2024 1246 by Tana Gotti R.N.  Outcome: Progressing       Problem: Skin Integrity  Goal: Skin integrity is maintained or improved  8/30/2024 1246 by Tana Gotti R.N.  Outcome: Progressing       Problem: Fall Risk  Goal: Patient will remain free from falls  8/30/2024 1246 by Tana Gotti R.N.  Outcome: Progressing       Problem: Knowledge Deficit with Chronic Kidney Disease process  Goal: Patient will maintain/demonstrate improvement in lab values  Outcome: Progressing     Patient is not progressing towards the following goals:

## 2024-08-30 NOTE — H&P
Hospital Medicine History & Physical Note    Date of Service  8/30/2024    Primary Care Physician  BEBETO Meade.    Consultants  nephrology    Code Status  Full Code    Chief Complaint  Chief Complaint   Patient presents with    Leg Pain     BLE pain x4 days. Pt says it hurts to walk and he has been feeling intermittent dizziness. Pt missed dialysis Monday due to leg pain.     Cough     Onset today.        History of Presenting Illness  Zack Gale is a 59 y.o. male who presented 8/29/2024 with leg pain, cough.  PMH of ESRD on HD MWF, CAD s/p CABG, HTN.  Patient has been having flulike symptoms for the past 5 or 6 days including cough, sore throat, runny nose, malaise/fatigue.  He is last dialysis was on 8/26 and skipped 8/28 dose due to feeling unwell.  Now having muscular cramps in his bilateral lower extremities.    In the ED febrile to 100.6, tachycardic.  BP elevated up to 225 systolic.  Lab showed a potassium of 6.1 which improved with treatment of 4.4.  COVID-positive.    I discussed the plan of care with patient, bedside RN, and edp .    Review of Systems  Review of Systems   Constitutional:  Positive for chills, fever and malaise/fatigue.   Respiratory:  Negative for cough and shortness of breath.    Cardiovascular:  Negative for chest pain.   Gastrointestinal:  Negative for abdominal pain, diarrhea, nausea and vomiting.   Genitourinary:  Negative for dysuria and urgency.   Musculoskeletal:  Positive for myalgias.       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    Surgical History   has no past surgical history on file.     Family History  Family history reviewed with patient. There is no family history that is pertinent to the chief complaint.      Social History   reports that he has never smoked. He has never used smokeless tobacco. He reports current alcohol use. He reports that he does not use drugs.    Allergies  No Known Allergies    Medications  Prior to Admission Medications   Prescriptions Last Dose Informant Patient Reported? Taking?   amLODIPine (NORVASC) 10 MG Tab   Yes No   Sig: Take 10 mg by mouth at bedtime.   famotidine (PEPCID) 20 MG Tab   No No   Sig: Take 1 Tablet by mouth at bedtime.   Patient not taking: Reported on 12/7/2023   insulin glargine (LANTUS) 100 UNIT/ML Solution   No No   Sig: Inject 4 Units under the skin every evening.   losartan (COZAAR) 50 MG Tab   Yes No   Sig: Take 50 mg by mouth at bedtime.   rosuvastatin (CRESTOR) 20 MG Tab   No No   Sig: Take 1 Tablet by mouth every evening.      Facility-Administered Medications: None       Physical Exam  Temp:  [37.1 °C (98.7 °F)-38.1 °C (100.6 °F)] 37.8 °C (100.1 °F)  Pulse:  [] 113  Resp:  [9-26] 18  BP: (137-225)/() 163/78  SpO2:  [92 %-100 %] 97 %  Blood Pressure: (!) 170/81   Temperature: 37.8 °C (100.1 °F)   Pulse: (!) 117   Respiration: 12   Pulse Oximetry: 98 %       Physical Exam  Constitutional:       Appearance: Normal appearance.   HENT:      Head: Normocephalic and atraumatic.      Mouth/Throat:      Mouth: Mucous membranes are moist.      Pharynx: Oropharynx is clear. No oropharyngeal exudate or posterior oropharyngeal erythema.   Eyes:      General: No scleral icterus.  Cardiovascular:      Rate and Rhythm: Normal rate and regular rhythm.      Pulses: Normal pulses.      Heart sounds: Normal heart sounds. No murmur heard.  Pulmonary:      Effort: Pulmonary effort is normal. No respiratory distress.      Breath sounds: Normal breath sounds. No wheezing.   Abdominal:      Palpations: Abdomen is soft.      Tenderness: There is no abdominal tenderness.   Musculoskeletal:         General: No swelling or tenderness. Normal range of motion.      Cervical back:  "Normal range of motion.   Skin:     General: Skin is warm and dry.   Neurological:      General: No focal deficit present.      Mental Status: He is alert and oriented to person, place, and time. Mental status is at baseline.   Psychiatric:         Mood and Affect: Mood normal.         Laboratory:  Recent Labs     08/30/24 0058   WBC 6.5   RBC 4.32*   HEMOGLOBIN 12.3*   HEMATOCRIT 37.7*   MCV 87.3   MCH 28.5   MCHC 32.6   RDW 51.4*   PLATELETCT 155*   MPV 10.3     Recent Labs     08/30/24 0058 08/30/24  0404   SODIUM 132* 133*   POTASSIUM 6.1* 4.4   CHLORIDE 90* 94*   CO2 21 20   GLUCOSE 51* 78   BUN 53* 54*   CREATININE 9.45* 9.77*   CALCIUM 9.0 9.1     Recent Labs     08/30/24 0058 08/30/24 0404   ALTSGPT 10  --    ASTSGOT 19  --    ALKPHOSPHAT 175*  --    TBILIRUBIN 0.5  --    GLUCOSE 51* 78         No results for input(s): \"NTPROBNP\" in the last 72 hours.      No results for input(s): \"TROPONINT\" in the last 72 hours.    Imaging:  CT-ABDOMEN-PELVIS W/O   Final Result         1.  Fluid-filled prominence of small bowel, consider ileus and/or enteritis   2.  Moderate layering right pleural effusion. Trace left pleural effusion.   3.  Right lower lobe consolidation, consider atelectasis versus infiltrate   4.  Hepatomegaly   5.  Atherosclerosis   6.  Large volume of abdominal ascites   7.  Atherosclerosis including atherosclerotic coronary artery disease        EKG:  I have personally reviewed the images and compared with prior images. and My impression is: Sinus tachycardia, RBBB.  Known history of RBBB    Assessment/Plan:  Justification for Admission Status  I anticipate this patient will require at least two midnights for appropriate medical management, necessitating inpatient admission because hyperkalemia, missed dialysis    * Hyperkalemia- (present on admission)  Assessment & Plan  Patient comes in complaining of leg cramps.  Potassium found to be 6.1, he missed his last dialysis dose due to feeling under " the weather, he is COVID-positive  Treated in the ED with improvement to 4.4  EDP discussed with nephrology who will dialyze today  BMP ordered Q6 to continue monitoring    COVID-19 virus infection- (present on admission)  Assessment & Plan  Symptomatic, not requiring any oxygen.  Supportive care    End-stage renal disease on hemodialysis (HCC)- (present on admission)  Assessment & Plan  On HD MWF, skipped his last session due to feeling under the weather  EDP discussed with gradual past with dialysis  BMP ordered continue monitoring, renally dose all medications, avoid nephrotoxic agents    Essential (primary) hypertension- (present on admission)  Assessment & Plan  Continue nifedipine    Coronary artery disease involving native coronary artery of native heart without angina pectoris- (present on admission)  Assessment & Plan  Not on any medications, start aspirin    Anemia due to chronic kidney disease, on chronic dialysis (HCC)- (present on admission)  Assessment & Plan  Hemoglobin at around baseline secondary to chronic disease.  CBC ordered continue monitoring        VTE prophylaxis: heparin ppx

## 2024-08-30 NOTE — PROGRESS NOTES
4 Eyes Skin Assessment Completed by NICOLE Mitchell and Marcy RN.    Head WDL  Ears WDL  Nose WDL  Mouth WDL  Neck WDL  Breast/Chest WDL  Shoulder Blades WDL  Spine WDL  (R) Arm/Elbow/Hand WDL  (L) Arm/Elbow/Hand WDL, AV fistula upper arm  Abdomen WDL  Groin WDL  Scrotum/Coccyx/Buttocks Discoloration, non blanching  (R) Leg Scab  (L) Leg WDL  (R) Heel/Foot/Toe WDL  (L) Heel/Foot/Toe WDL                Devices In Places Tele Box, Blood Pressure Cuff, and Pulse Ox      Interventions In Place Pillows and Pressure Redistribution Mattress    Possible Skin Injury No    Pictures Uploaded Into Epic Yes  Wound Consult Placed Yes  RN Wound Prevention Protocol Ordered Yes

## 2024-08-30 NOTE — ED NOTES
BGL 58 mg/dL. ERP notified. Juice and sandwich provided. Patient tolerating PO well. D50 ordered PRN.

## 2024-08-30 NOTE — CONSULTS
DATE OF SERVICE:  08/30/2024     REQUESTING PHYSICIAN:  Shai Hurt MD     REASON FOR CONSULTATION:  Hyperkalemia in the setting of end-stage renal   disease, assess the need for urgent dialysis.     The patient is seen and examined, medical record reviewed.     HISTORY OF PRESENT ILLNESS:  The patient is a 59-year-old gentleman with a   past medical history significant for end-stage renal disease, undergoes   hemodialysis on Monday, Wednesday, Friday, apparently he missed last dialysis   treatment on Wednesday, patient presented to the hospital last night with   cough.  The patient was found to be hyperkalemic with initial potassium at   6.1, also eventually was diagnosed with COVID-19 infection.  The patient has   been admitted.  We were called to manage his hyperkalemia, assess the need for   urgent dialysis.     The patient feels tired.  He has occasional fever and mild shortness of   breath.     PAST MEDICAL HISTORY:  Significant for:  1.  End-stage renal disease.  2.  Diabetes.     ALLERGIES:  No known drug allergies.     SOCIAL HISTORY:  The patient has no smoking history.     FAMILY HISTORY:  No known renal disease.     MEDICATIONS:  Reviewed.     REVIEW OF SYSTEMS:  All systems were reviewed; they were negative except   outlined in the history of present illness.     PHYSICAL EXAMINATION:   GENERAL:  The patient appears ill.  VITAL SIGNS:  Showed blood pressure of 149/72, heart rate was 78, respiratory   rate was 16.  HEENT:  Normocephalic, atraumatic.  Sclerae are anicteric.  Pupils are   reactive.  Nose normal.  Mucous membranes moist.  NECK:  No lymphadenopathy, no JVD, no thyroid mass.  CHEST:  Normal.  LUNGS:  Scattered rhonchi.  HEART:  S1, S2.  ABDOMEN:  Soft, nontender.  No hepatosplenomegaly.  There is no inguinal   lymphadenopathy.  EXTREMITIES:  There is trace lower extremity edema.  SKIN:  No skin rash.  NEUROLOGIC:  The patient is alert, oriented x3.     LABORATORY DATA:  His recent labs were  reviewed.     DIAGNOSTIC DATA:  The patient had an EKG, which I reviewed the image myself,   showed sinus tachycardia with a rate of 123.     ASSESSMENT:  1.  End-stage renal disease.  2.  Hyperkalemia.  3.  COVID-19 infection.  4.  Anemia.     PLAN:   1.  We will plan urgent dialysis to manage hyperkalemia and respiratory   failure.  2.  Evaluate daily for the need of dialysis.  3.  Renal diet.  4.  Renal dose all medications.  5.  Prognosis is guarded.     Plan discussed in detail with Dr. Hurt.        ______________________________  FADI NAJJAR, MD FN/RUB    DD:  08/30/2024 13:14  DT:  08/30/2024 14:22    Job#:  [id number]

## 2024-08-30 NOTE — PROGRESS NOTES
Zack Gale 59-year-old male with PMHx ESRD on HD MWF, CAD s/p CABG, HTN.  Admitted 8/29 for missed dialysis secondary to flulike symptoms.    In the emergency room patient was noted to be febrile with a low-grade fever 100.6.  Tachycardic.  Potassium 6.1.  And COVID-positive.  He was not hypoxic and did not require supplemental oxygen.  Nephrology was consulted from the emergency room.    Potassium improved to 4.7 from 6.1 with dextrose and insulin and dialysis.    Problem list:   Hyperkalemia  ESRD on HD  COVID infection  HTN  CAD  Anemia secondary to CKD    Plan:  - Nephrology following for HD needs  - Repeat BMP in a.m.  - Continue supportive care for COVID infection  - Continuous pulse ox monitoring  - Start throat lozenges for sore throat    Kathleen Pathak MD

## 2024-08-30 NOTE — HOSPITAL COURSE
Zack Gale 59-year-old male with PMHx ESRD on HD MWF, CAD s/p CABG, HTN.  Admitted 8/29 for missed dialysis secondary to flulike symptoms.    In the emergency room patient was noted to be febrile with a low-grade fever 100.6.  Tachycardic.  Potassium 6.1.  And COVID-positive.  He was not hypoxic and did not require supplemental oxygen.  Nephrology was consulted from the emergency room.

## 2024-08-30 NOTE — ED NOTES
Med rec complete per patient  Allergies reviewed.   Outpatient antibiotics in the last 30 days? No   Anticoagulants taken in the last 14 days? No     Pharmacy patient utilizes: [facility] on [address]    Rosa Tobin CPhT

## 2024-08-30 NOTE — ED TRIAGE NOTES
"Chief Complaint   Patient presents with    Leg Pain     BLE pain x4 days. Pt says it hurts to walk and he has been feeling intermittent dizziness. Pt missed dialysis Monday due to leg pain.     Cough     Onset today.      Blood Pressure: (!) 182/79, Pulse: 77, Respiration: 18, Temperature: 37.1 °C (98.7 °F), Height: 172.7 cm (5' 8\"), Weight: 54.9 kg (121 lb 0.5 oz), BMI (Calculated): 18.4, BSA (Calculated): 1.6, Pulse Oximetry: 95 %    Covid swab in process   "

## 2024-08-30 NOTE — ED NOTES
Bedside report received from off going RN: Amanuel, assumed care of patient.  POC discussed with patient. Call light within reach, all needs addressed at this time.       Fall risk interventions in place: Move the patient closer to the nurse's station, Patient's personal possessions are with in their safe reach, Place socks on patient, Place fall risk sign on patient's door, and Keep floor surfaces clean and dry (all applicable per [facility] Fall risk assessment)   Continuous monitoring: Cardiac Leads, Pulse Ox, or Blood Pressure  IVF/IV medications: Not Applicable   Oxygen: Room Air  Bedside sitter: Not Applicable   Isolation: Not Applicable

## 2024-08-30 NOTE — PROGRESS NOTES
Patient transferred to Tele 7 with all belongings at bedside. Tele box placed, monitors notified. All current VSS. Enhanced droplet precautions in place, isolation cart at door. Patient educated  on the use of call light for assistance. No further complaints at this time.

## 2024-08-30 NOTE — ASSESSMENT & PLAN NOTE
Patient comes in complaining of leg cramps.  Potassium found to be 6.1, he missed his last dialysis dose due to feeling under the weather, he is COVID-positive  Treated in the ED with improvement to 4.4  EDP discussed with nephrology who will dialyze today  BMP ordered Q6 to continue monitoring

## 2024-08-30 NOTE — PROGRESS NOTES
[facility] Progress Note     Hemodialysis treatment  x 3 hours done today as ordered per Dr. Najjar.  Treatment initiated at 1145 and ended at 1446.      PRE-TX : tx @ BS d/t Covid + status. A& o 4, denies CP/SOB. RA. Complaint of pleuritic pain r/t coughing. Crackles t/o fields, non productive cough. No significant edema. AVF, ISAI, 15g x 1 attempt.     POST TX: tx completed as ordered, returned blood, flushed lines, deaccessed. VSS, no change in assessment date, pt sat up with lunch.    Total HD time : 3 hrs      See e-flow sheets for further details.     Net UF : 1500 mL     Deaccessed AVF, ISAI per protocol. Hemostasis obtained within 5 min arterial site. Venous site continued to ooze for 10 min. Hemostasis acheived. Dressed access sites with gauze and paper tape.       Report given to primary RN. Please assess for s/s bleeding, remove dressing after 8  hours if none arise.

## 2024-08-30 NOTE — ASSESSMENT & PLAN NOTE
On HD MWF, skipped his last session due to feeling under the weather  EDP discussed with gradual past with dialysis  BMP ordered continue monitoring, renally dose all medications, avoid nephrotoxic agents

## 2024-08-30 NOTE — ED NOTES
Patient reports feeling better. Ambulated to bathroom with steady gait. Transported to T7 on [facility] with full vitals monitoring by  tech. Aox4, on room air.

## 2024-08-30 NOTE — DISCHARGE PLANNING
Outpatient Dialysis Note     Confirmed patient is active at:     [facility]  [address] , [place] [address]     Schedule: Mon, Wed, Fri   Time: 5:30 AM    Confirmed patient can attend regular schedule while COV +    Patient is followed by Dr. Calvo.     Spoke with Ana at facility who confirmed patient information.   Forwarded records for review.     Xitlaly Reyes   Dialysis Coordinator / [facility]  Ph: [phone]

## 2024-08-30 NOTE — ED PROVIDER NOTES
ER Provider Note    Scribed for Dr. Shai Hurt MD. by Aguila Alcantar. 8/30/2024  12:15 AM    Primary Care Provider: RICHIE Meade    CHIEF COMPLAINT  Chief Complaint   Patient presents with    Leg Pain     BLE pain x4 days. Pt says it hurts to walk and he has been feeling intermittent dizziness. Pt missed dialysis Monday due to leg pain.     Cough     Onset today.      EXTERNAL RECORDS REVIEWED      HPI/ROS  LIMITATION TO HISTORY   Select: : None    OUTSIDE HISTORIAN(S):  None    Zack Gale is a 59 y.o. male who presents to the ED for evaluation of cough onset today. The patient reports associated symptoms of sore throat, abdominal pain,  one episode of vomiting on Tuesday, occasional bilateral leg cramps and back pain since Tuesday, dizziness, and diarrhea.  He denies any previous episodes of similar symptoms. The patient does not produce urine.     The patient last had dialysis on Monday, but missed his Wednesday appointment and has his next appointment on Friday.     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 No pertinent surgical history.    FAMILY HISTORY  No family history noted.    SOCIAL HISTORY   reports that he has never smoked. He has never used smokeless tobacco. He reports current alcohol use. He reports that he does not use drugs.    CURRENT MEDICATIONS  Previous Medications    AMLODIPINE (NORVASC) 10 MG TAB    Take 10 mg by mouth at bedtime.    FAMOTIDINE (PEPCID) 20 MG TAB    Take 1 Tablet by mouth at bedtime.    INSULIN GLARGINE (LANTUS) 100 UNIT/ML SOLUTION    Inject 4 Units under the skin every evening.    LOSARTAN (COZAAR) 50 MG TAB    Take 50 mg by mouth at bedtime.    ROSUVASTATIN  "(CRESTOR) 20 MG TAB    Take 1 Tablet by mouth every evening.     ALLERGIES  Patient has no known allergies.    PHYSICAL EXAM  BP (!) 182/79   Pulse 77   Temp (!) 38.1 °C (100.6 °F) (Oral)   Resp 18   Ht 1.727 m (5' 8\")   Wt 54.9 kg (121 lb 0.5 oz)   SpO2 95%   BMI 18.40 kg/m²   Physical Exam  Vitals and nursing note reviewed.   Constitutional:       Appearance: He is well-developed.      Comments: Cachectic appearing   HENT:      Head: Normocephalic.   Cardiovascular:      Rate and Rhythm: Normal rate and regular rhythm.      Heart sounds: No murmur heard.  Pulmonary:      Effort: Pulmonary effort is normal.      Breath sounds: Normal breath sounds.   Abdominal:      Palpations: Abdomen is soft.      Comments: Diffuse tenderness with guarding. No rebound.    Musculoskeletal:         General: Normal range of motion.      Right lower leg: No edema.      Left lower leg: No edema.   Skin:     General: Skin is warm.   Neurological:      General: No focal deficit present.      Mental Status: He is oriented to person, place, and time.     DIAGNOSTIC STUDIES & PROCEDURES    Labs:   Results for orders placed or performed during the hospital encounter of 08/29/24   CBC WITH DIFFERENTIAL   Result Value Ref Range    WBC 6.5 4.8 - 10.8 K/uL    RBC 4.32 (L) 4.70 - 6.10 M/uL    Hemoglobin 12.3 (L) 14.0 - 18.0 g/dL    Hematocrit 37.7 (L) 42.0 - 52.0 %    MCV 87.3 81.4 - 97.8 fL    MCH 28.5 27.0 - 33.0 pg    MCHC 32.6 32.3 - 36.5 g/dL    RDW 51.4 (H) 35.9 - 50.0 fL    Platelet Count 155 (L) 164 - 446 K/uL    MPV 10.3 9.0 - 12.9 fL    Neutrophils-Polys 66.70 44.00 - 72.00 %    Lymphocytes 20.40 (L) 22.00 - 41.00 %    Monocytes 11.60 0.00 - 13.40 %    Eosinophils 0.20 0.00 - 6.90 %    Basophils 0.80 0.00 - 1.80 %    Immature Granulocytes 0.30 0.00 - 0.90 %    Nucleated RBC 0.00 0.00 - 0.20 /100 WBC    Neutrophils (Absolute) 4.33 1.82 - 7.42 K/uL    Lymphs (Absolute) 1.32 1.00 - 4.80 K/uL    Monos (Absolute) 0.75 0.00 - 0.85 " K/uL    Eos (Absolute) 0.01 0.00 - 0.51 K/uL    Baso (Absolute) 0.05 0.00 - 0.12 K/uL    Immature Granulocytes (abs) 0.02 0.00 - 0.11 K/uL    NRBC (Absolute) 0.00 K/uL   COMP METABOLIC PANEL   Result Value Ref Range    Sodium 132 (L) 135 - 145 mmol/L    Potassium 6.1 (H) 3.6 - 5.5 mmol/L    Chloride 90 (L) 96 - 112 mmol/L    Co2 21 20 - 33 mmol/L    Anion Gap 21.0 (H) 7.0 - 16.0    Glucose 51 (LL) 65 - 99 mg/dL    Bun 53 (H) 8 - 22 mg/dL    Creatinine 9.45 (HH) 0.50 - 1.40 mg/dL    Calcium 9.0 8.5 - 10.5 mg/dL    Correct Calcium 8.7 8.5 - 10.5 mg/dL    AST(SGOT) 19 12 - 45 U/L    ALT(SGPT) 10 2 - 50 U/L    Alkaline Phosphatase 175 (H) 30 - 99 U/L    Total Bilirubin 0.5 0.1 - 1.5 mg/dL    Albumin 4.4 3.2 - 4.9 g/dL    Total Protein 8.0 6.0 - 8.2 g/dL    Globulin 3.6 (H) 1.9 - 3.5 g/dL    A-G Ratio 1.2 g/dL   MAGNESIUM   Result Value Ref Range    Magnesium 2.9 (H) 1.5 - 2.5 mg/dL   PHOSPHORUS   Result Value Ref Range    Phosphorus 5.1 (H) 2.5 - 4.5 mg/dL   ESTIMATED GFR   Result Value Ref Range    GFR (CKD-EPI) 6 (A) >60 mL/min/1.73 m 2   Basic Metabolic Panel   Result Value Ref Range    Sodium 133 (L) 135 - 145 mmol/L    Potassium 4.4 3.6 - 5.5 mmol/L    Chloride 94 (L) 96 - 112 mmol/L    Co2 20 20 - 33 mmol/L    Glucose 78 65 - 99 mg/dL    Bun 54 (H) 8 - 22 mg/dL    Creatinine 9.77 (HH) 0.50 - 1.40 mg/dL    Calcium 9.1 8.5 - 10.5 mg/dL    Anion Gap 19.0 (H) 7.0 - 16.0   ESTIMATED GFR   Result Value Ref Range    GFR (CKD-EPI) 6 (A) >60 mL/min/1.73 m 2   EKG   Result Value Ref Range    Report       [facility] Emergency Dept.    Test Date:  2024  Pt Name:    MEGHANA PRUETT             Department: ER  MRN:        [id number]                      Room:       [facility]  Gender:     Male                         Technician: [id number]  :        1965                   Requested By:GRACE WHITE  Order #:    [id number]                    Reading MD: Grace White    Measurements  Intervals                                 Axis  Rate:       71                           P:          0  IN:         0                            QRS:        191  QRSD:       176                          T:          -3  QT:         435  QTc:        473    Interpretive Statements  Accelerated junctional rhythm  Nonspecific intraventricular conduction delay  Minimal ST depression  Compared to ECG 2009 11:26:09  Accelerated junctional rhythm now present  Intraventricular conduction delay now present  Ventricular premature complex(es) no longer present  Right bundle-branch block no longer pr esent  Myocardial infarct finding no longer present  Possible ischemia no longer present  ST (T wave) deviation still present  Electronically Signed On 2024 03:53:48 PDT by Grace White     EKG   Result Value Ref Range    Report       [facility] Emergency Dept.    Test Date:  2024  Pt Name:    MEGHANA PRUETT             Department: ER  MRN:        [id number]                      Room:       [facility]  Gender:     Male                         Technician: [id number]  :        1965                   Requested By:GRACE WHITE  Order #:    [id number]                    Reading MD: Grace White    Measurements  Intervals                                Axis  Rate:       104                          P:          47  IN:         145                          QRS:        206  QRSD:       163                          T:          -24  QT:         383  QTc:        504    Interpretive Statements  Sinus tachycardia  Nonspecific intraventricular conduction delay  ST depr, consider ischemia, inferior leads  ST depression V1-V3, suggest recording posterior leads  Compared to ECG 2024 02:05:36  Possible ischemia now present  Accelerated junctional rhythm no longer present  ST (T wave) deviation still presen t  Electronically Signed On 2024 03:53:46 PDT by Grace White     EKG   Result Value Ref Range    Report       [facility]  [facility] Emergency Dept.    Test Date:  2024  Pt Name:    [facility]             Department: ER  MRN:        [id number]                      Room:       [facility]  Gender:     Male                         Technician: [id number]  :        1965                   Requested By:GRACE WHITE  Order #:    [id number]                    Reading MD: Grace White    Measurements  Intervals                                Axis  Rate:       147                          P:          0  NH:         67                           QRS:        -106  QRSD:       165                          T:          -37  QT:         343  QTc:        537    Interpretive Statements  Sinus tachycardia  Right bundle branch block  ST depr, consider ischemia, inferior leads  Compared to ECG 2024 03:35:30  Right bundle-branch block now present  Intraventricular conduction delay no longer present  ST (T wave) deviation no longer present  Possible ischemia still present  Electronically Signed On  2024 04:38:09 PDT by Grace White     EKG   Result Value Ref Range    Report       [facility] Emergency Dept.    Test Date:  2024  Pt Name:    [facility]             Department: ER  MRN:        [id number]                      Room:       [facility]  Gender:     Male                         Technician: [id number]  :        1965                   Requested By:GRACE WHITE  Order #:    [id number]                    Reading MD: Grace White    Measurements  Intervals                                Axis  Rate:       123                          P:          0  NH:         104                          QRS:        204  QRSD:       178                          T:          -27  QT:         376  QTc:        538    Interpretive Statements  Sinus tachycardia  Nonspecific intraventricular conduction delay  ST depr, consider ischemia, inferior leads  ST depression V1-V3, suggest recording posterior leads  Compared to ECG 2024  03:55:58  Intraventricular conduction delay now present  ST (T wave) deviation now present  Right bundle-branch block no long er present  Possible ischemia still present  Electronically Signed On 08- 04:38:44 PDT by Shai Hurt     POC CoV-2, FLU A/B, RSV by PCR   Result Value Ref Range    POC Influenza A RNA, PCR Negative Negative    POC Influenza B RNA, PCR Negative Negative    POC RSV, by PCR Negative Negative    POC SARS-CoV-2, PCR DETECTED (AA) NotDetected   POCT glucose device results   Result Value Ref Range    POC Glucose, Blood 324 (H) 65 - 99 mg/dL   POCT glucose device results   Result Value Ref Range    POC Glucose, Blood 91 65 - 99 mg/dL   POCT glucose device results   Result Value Ref Range    POC Glucose, Blood 58 (L) 65 - 99 mg/dL     All labs reviewed by me.    EKG:   I have independently interpreted this EKG.     CARDIAC MONITORING    The cardiac monitor revealed accelerated junctional rhythm as interpreted by me. The cardiac monitor was ordered secondary to the patient’s complaint of dizziness and to monitor the patient for dysrhythmia    Radiology:   The attending Emergency Physician has independently interpreted the diagnostic imaging associated with this visit and is awaiting the final reading from the radiologist, which will be displayed below.    Preliminary interpretation is a follows: CT abdomen with abdominal ascites and enteritis  Radiologist interpretation:    CT-ABDOMEN-PELVIS W/O   Final Result         1.  Fluid-filled prominence of small bowel, consider ileus and/or enteritis   2.  Moderate layering right pleural effusion. Trace left pleural effusion.   3.  Right lower lobe consolidation, consider atelectasis versus infiltrate   4.  Hepatomegaly   5.  Atherosclerosis   6.  Large volume of abdominal ascites   7.  Atherosclerosis including atherosclerotic coronary artery disease        COURSE & MEDICAL DECISION MAKING    INITIAL ASSESSMENT AND PLAN  Care Narrative:       12:15 AM  - Patient seen and evaluated at bedside.  59-year-old male presenting with flulike symptoms for the past 3 to 4 days and noting that he missed dialysis on Wednesday due to feeling ill.  He is somewhat ill-appearing on exam with a low-grade fever and there is some diffuse abdominal tenderness with voluntary guarding.  Initial laboratory workup has returned from triage and the patient is notably COVID-positive.  Will add on blood work and CT scan to further evaluate    1:55 AM - Reviewed patient labs at this time as noted above.  Patient with mild hyperkalemia will obtain EKG    0200 am EKG with accelerated junctional rhythm.  Prior EKG for comparison in 2009 with normal sinus rhythm and narrow QRS at that time.  Will assume this is due to mild hyperkalemia and treat accordingly.  Otherwise suspect this is potentially chronic right bundle branch block with poor P wave activity.    2:26 AM - Paged nephrology.     2:44 AM - I discussed the patient's case and the above findings with Dr. Torres (Nephrology) who recommends dialysis in the morning.     3:52 AM - Reviewed repeat EKG at this time.  No significant improvement after hyperkalemia medications given.  Therefore I do suspect this is the patient's chronic EKG.    4:04 AM -  I was called to bedside. While repeating EKG the patient's heart rate went from mildly tachycardic to persistently between 145-150; concern for SVT. Repeat EKG obtained with significant artifact and poor baseline which makes it difficult to discern the true rhythm. Plan for a trial of adenosine. Patient denies any shortness of breath or chest pain so I don't suspect this to be ischemic. Will order repeat BMP to track potassium.     4:28 AM - Patient was reevaluated at bedside after being moved to [facility].  Heart rate improved to 120s. Fourth EKG performed and reviewed at bedside which revealed junctional rhythm versus sinus rhythm with right bundle branch block. Plan for  hospitalization.    Discussed the case with the hospitalist who currently agrees to admit for further management    CRITICAL CARE  The very real possibilty of a deterioration of this patient's condition required the highest level of my preparedness for sudden, emergent intervention. I provided critical care services, which included medication orders, frequent reevaluations of the patient's condition and response to treatment, ordering and reviewing test results, and discussing the case with various consultants. The critical care time associated with the care of the patient was 40 minutes. Review chart for interventions. This time is exclusive of any other billable procedures.      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 Torres (Nephrology) Dr. Ramírez    Discussion of management with other [facility] or appropriate source(s): None     Escalation of care considered, and ultimately not performed: .    Barriers to care at this time, including but not limited to:  None known at this time .     Decision tools and prescription drugs considered including, but not limited to: .    DISPOSITION:  Patient will be hospitalized by Dr. Ramírez in guarded condition.     FINAL IMPRESSION   1. Hyperkalemia    2. COVID    3. Tachycardia      + Critical Care, 40 minutes     Aguila GRAF (Isaiah), am scribing for, and in the presence of, Shai Hurt M.D..    Electronically signed by: Aguila Galicia), 8/30/2024    Shai GRAF M.D. personally performed the services described in this documentation, as scribed by Aguila Alcantar in my  presence, and it is both accurate and complete.    The note accurately reflects work and decisions made by me.  Shai Hurt M.D.  8/30/2024  5:49 AM

## 2024-08-30 NOTE — ED NOTES
Pt transferred from the [facility] to bedside commode. Pt tolerated it well. Pt had a bowel movement

## 2024-08-31 VITALS
BODY MASS INDEX: 18.34 KG/M2 | TEMPERATURE: 98.4 F | WEIGHT: 121.03 LBS | HEART RATE: 73 BPM | OXYGEN SATURATION: 95 % | RESPIRATION RATE: 14 BRPM | SYSTOLIC BLOOD PRESSURE: 133 MMHG | HEIGHT: 68 IN | DIASTOLIC BLOOD PRESSURE: 78 MMHG

## 2024-08-31 PROBLEM — J96.01 ACUTE HYPOXEMIC RESPIRATORY FAILURE DUE TO COVID-19 (HCC): Status: ACTIVE | Noted: 2024-08-31

## 2024-08-31 PROBLEM — D75.89 BICYTOPENIA: Status: ACTIVE | Noted: 2024-08-31

## 2024-08-31 PROBLEM — E44.0 MODERATE PROTEIN-CALORIE MALNUTRITION (HCC): Status: ACTIVE | Noted: 2024-08-31

## 2024-08-31 PROBLEM — U07.1 ACUTE HYPOXEMIC RESPIRATORY FAILURE DUE TO COVID-19 (HCC): Status: ACTIVE | Noted: 2024-08-31

## 2024-08-31 LAB
ANION GAP SERPL CALC-SCNC: 14 MMOL/L (ref 7–16)
BUN SERPL-MCNC: 32 MG/DL (ref 8–22)
CALCIUM SERPL-MCNC: 8.5 MG/DL (ref 8.5–10.5)
CHLORIDE SERPL-SCNC: 93 MMOL/L (ref 96–112)
CO2 SERPL-SCNC: 28 MMOL/L (ref 20–33)
CREAT SERPL-MCNC: 6.82 MG/DL (ref 0.5–1.4)
ERYTHROCYTE [DISTWIDTH] IN BLOOD BY AUTOMATED COUNT: 53.1 FL (ref 35.9–50)
GFR SERPLBLD CREATININE-BSD FMLA CKD-EPI: 9 ML/MIN/1.73 M 2
GLUCOSE SERPL-MCNC: 76 MG/DL (ref 65–99)
HCT VFR BLD AUTO: 30.9 % (ref 42–52)
HGB BLD-MCNC: 9.9 G/DL (ref 14–18)
MAGNESIUM SERPL-MCNC: 2.3 MG/DL (ref 1.5–2.5)
MCH RBC QN AUTO: 28.3 PG (ref 27–33)
MCHC RBC AUTO-ENTMCNC: 32 G/DL (ref 32.3–36.5)
MCV RBC AUTO: 88.3 FL (ref 81.4–97.8)
PLATELET # BLD AUTO: 129 K/UL (ref 164–446)
PMV BLD AUTO: 9.8 FL (ref 9–12.9)
POTASSIUM SERPL-SCNC: 4.9 MMOL/L (ref 3.6–5.5)
RBC # BLD AUTO: 3.5 M/UL (ref 4.7–6.1)
SODIUM SERPL-SCNC: 135 MMOL/L (ref 135–145)
WBC # BLD AUTO: 5.1 K/UL (ref 4.8–10.8)

## 2024-08-31 PROCEDURE — 700111 HCHG RX REV CODE 636 W/ 250 OVERRIDE (IP): Performed by: STUDENT IN AN ORGANIZED HEALTH CARE EDUCATION/TRAINING PROGRAM

## 2024-08-31 PROCEDURE — 700102 HCHG RX REV CODE 250 W/ 637 OVERRIDE(OP): Performed by: INTERNAL MEDICINE

## 2024-08-31 PROCEDURE — A9270 NON-COVERED ITEM OR SERVICE: HCPCS | Performed by: STUDENT IN AN ORGANIZED HEALTH CARE EDUCATION/TRAINING PROGRAM

## 2024-08-31 PROCEDURE — 99233 SBSQ HOSP IP/OBS HIGH 50: CPT | Performed by: INTERNAL MEDICINE

## 2024-08-31 PROCEDURE — 83735 ASSAY OF MAGNESIUM: CPT

## 2024-08-31 PROCEDURE — 770020 HCHG ROOM/CARE - TELE (206)

## 2024-08-31 PROCEDURE — 80048 BASIC METABOLIC PNL TOTAL CA: CPT

## 2024-08-31 PROCEDURE — 36415 COLL VENOUS BLD VENIPUNCTURE: CPT

## 2024-08-31 PROCEDURE — 85027 COMPLETE CBC AUTOMATED: CPT

## 2024-08-31 PROCEDURE — 99232 SBSQ HOSP IP/OBS MODERATE 35: CPT | Performed by: INTERNAL MEDICINE

## 2024-08-31 PROCEDURE — 700102 HCHG RX REV CODE 250 W/ 637 OVERRIDE(OP): Performed by: STUDENT IN AN ORGANIZED HEALTH CARE EDUCATION/TRAINING PROGRAM

## 2024-08-31 RX ORDER — DEXAMETHASONE 4 MG/1
6 TABLET ORAL DAILY
Status: DISCONTINUED | OUTPATIENT
Start: 2024-08-31 | End: 2024-09-01

## 2024-08-31 RX ADMIN — ASPIRIN 81 MG: 81 TABLET, COATED ORAL at 05:02

## 2024-08-31 RX ADMIN — HEPARIN SODIUM 5000 UNITS: 5000 INJECTION, SOLUTION INTRAVENOUS; SUBCUTANEOUS at 05:02

## 2024-08-31 RX ADMIN — NIFEDIPINE 90 MG: 30 TABLET, EXTENDED RELEASE ORAL at 18:40

## 2024-08-31 RX ADMIN — DEXAMETHASONE 6 MG: 4 TABLET ORAL at 18:41

## 2024-08-31 ASSESSMENT — ENCOUNTER SYMPTOMS
VOMITING: 0
DOUBLE VISION: 0
SHORTNESS OF BREATH: 1
BLURRED VISION: 0
MYALGIAS: 1
CHILLS: 0
NAUSEA: 1
NAUSEA: 0
HEADACHES: 0
COUGH: 1
PALPITATIONS: 0
FEVER: 0
SHORTNESS OF BREATH: 0

## 2024-08-31 ASSESSMENT — FIBROSIS 4 INDEX: FIB4 SCORE: 2.21

## 2024-08-31 NOTE — PROGRESS NOTES
[facility] Medicine Daily Progress Note    Date of Service  8/31/2024    Chief Complaint  Zack Gale is a 59 y.o. male admitted 8/29/2024 with general [facility]    Hospital Course  Zack Gale 59-year-old male with PMHx ESRD on HD MWF, CAD s/p CABG, HTN.  Admitted 8/29 for missed dialysis secondary to flulike symptoms.    In the emergency room patient was noted to be febrile with a low-grade fever 100.6.  Tachycardic.  Potassium 6.1.  And COVID-positive.  He was not hypoxic and did not require supplemental oxygen.  Nephrology was consulted from the emergency room.    Interval Problem Update  Patient was seen and examined at bedside.  No acute events overnight. Patient is resting comfortably in bed and in no acute distress.     HD per nephrology  Noted to be hypoxic on room air this afternoon  Start decadron for hypoxia in setting of COVID    I have discussed this patient's plan of care and discharge plan at IDT rounds today with Case Management, Nursing, Nursing leadership, and other members of the IDT team.    Consultants/Specialty  nephrology    Code Status  Full Code    Disposition  The patient is not medically cleared for discharge to home or a post-acute facility.      I have placed the appropriate orders for post-discharge needs.    Review of Systems  Review of Systems   Constitutional:  Negative for chills and fever.   HENT:  Negative for congestion.    Eyes:  Negative for blurred vision and double vision.   Respiratory:  Positive for cough and shortness of breath.    Cardiovascular:  Negative for chest pain and palpitations.   Gastrointestinal:  Positive for nausea.   Musculoskeletal:  Positive for myalgias.   Neurological:  Negative for headaches.        Physical Exam  Temp:  [37 °C (98.6 °F)-38 °C (100.4 °F)] 37.3 °C (99.1 °F)  Pulse:  [72-78] 72  Resp:  [16-19] 17  BP: (102-153)/(56-76) 111/60  SpO2:  [88 %-100 %] 88 %    Physical Exam  Vitals reviewed.   Constitutional:       General: He  is not in acute distress.     Comments: Cachectic in appearance   HENT:      Nose: No congestion.   Eyes:      General: No scleral icterus.  Cardiovascular:      Heart sounds: No murmur heard.  Pulmonary:      Breath sounds: No wheezing.   Abdominal:      Tenderness: There is no abdominal tenderness. There is no guarding or rebound.   Musculoskeletal:         General: No deformity.      Comments: Left AV fistula with some oozing - now stopped   Skin:     Findings: No bruising.   Neurological:      Mental Status: He is alert.   Psychiatric:         Mood and Affect: Mood normal.         Behavior: Behavior normal.         Fluids    Intake/Output Summary (Last 24 hours) at 8/31/2024 1649  Last data filed at 8/31/2024 0800  Gross per 24 hour   Intake 500 ml   Output --   Net 500 ml        Laboratory  Recent Labs     08/30/24  0058 08/31/24  0652   WBC 6.5 5.1   RBC 4.32* 3.50*   HEMOGLOBIN 12.3* 9.9*   HEMATOCRIT 37.7* 30.9*   MCV 87.3 88.3   MCH 28.5 28.3   MCHC 32.6 32.0*   RDW 51.4* 53.1*   PLATELETCT 155* 129*   MPV 10.3 9.8     Recent Labs     08/30/24  0404 08/30/24  1003 08/31/24  0652   SODIUM 133* 134* 135   POTASSIUM 4.4 4.7 4.9   CHLORIDE 94* 91* 93*   CO2 20 23 28   GLUCOSE 78 155* 76   BUN 54* 58* 32*   CREATININE 9.77* 9.89* 6.82*   CALCIUM 9.1 8.5 8.5                   Imaging  CT-ABDOMEN-PELVIS W/O   Final Result         1.  Fluid-filled prominence of small bowel, consider ileus and/or enteritis   2.  Moderate layering right pleural effusion. Trace left pleural effusion.   3.  Right lower lobe consolidation, consider atelectasis versus infiltrate   4.  Hepatomegaly   5.  Atherosclerosis   6.  Large volume of abdominal ascites   7.  Atherosclerosis including atherosclerotic coronary artery disease           Assessment/Plan  * Acute hypoxemic respiratory failure due to COVID-19 (HCC)  Assessment & Plan  Desatting into mid 80's on room air this afternoon  Start decadron  Encourage IS    Hyperkalemia-  (present on admission)  Assessment & Plan  Patient comes in complaining of leg cramps.  Potassium found to be 6.1, he missed his last dialysis dose due to feeling under the weather, he is COVID-positive  Treated in the ED with improvement to 4.4  EDP discussed with nephrology who will dialyze today  BMP ordered Q6 to continue monitoring    Moderate protein-calorie malnutrition (HCC)  Assessment & Plan  Cachectic in appearance  RD consult    Bicytopenia  Assessment & Plan  Hb 9.9  Plt 129    End-stage renal disease on hemodialysis (HCC)- (present on admission)  Assessment & Plan  On HD MWF, skipped his last session due to feeling under the weather  EDP discussed with gradual past with dialysis  BMP ordered continue monitoring, renally dose all medications, avoid nephrotoxic agents    Essential (primary) hypertension- (present on admission)  Assessment & Plan  Continue nifedipine    Coronary artery disease involving native coronary artery of native heart without angina pectoris- (present on admission)  Assessment & Plan  Not on any medications, start aspirin    Anemia due to chronic kidney disease, on chronic dialysis (HCC)- (present on admission)  Assessment & Plan  Hemoglobin at around baseline secondary to chronic disease.  CBC ordered continue monitoring         VTE prophylaxis:    heparin ppx      I have performed a physical exam and reviewed and updated ROS and Plan today (8/31/2024). In review of yesterday's note (8/30/2024), there are no changes except as documented above.    Greater than 51 minutes spent prepping to see patient (e.g. review of tests) obtaining and/or reviewing separately obtained history. Performing a medically appropriate examination and/ evaluation.  Counseling and educating the patient/family/caregiver.  Ordering medications, tests, or procedures.  Referring and communicating with other health care professionals.  Documenting clinical information in EPIC.  Independently interpreting  results and communicating results to patient/family/caregiver.  Care coordination.

## 2024-08-31 NOTE — PROGRESS NOTES
Bedside report received by RN and patient care assumed. Tele Box in place, patient is A&O4, resting in bed, and on RA. Patient states 0/10 pain. Fall precautions in place and patient educated on use of call light for assistance. Pt updated on POC with no questions or concerns. Hourly monitoring initiated.

## 2024-08-31 NOTE — PROGRESS NOTES
Nephrology Daily Progress Note    Date of Service  8/31/2024    Chief Complaint  59 y.o. male admitted 8/29/2024 with cough, diagnosed with Covid 19 infection    Interval Problem Update  C/o cough, no SOB    Review of Systems  Review of Systems   Constitutional:  Negative for chills, fever and malaise/fatigue.   Respiratory:  Positive for cough. Negative for shortness of breath.    Cardiovascular:  Negative for chest pain and leg swelling.   Gastrointestinal:  Negative for nausea and vomiting.   Genitourinary:  Negative for dysuria, frequency and urgency.        Physical Exam  Temp:  [37 °C (98.6 °F)-38 °C (100.4 °F)] 37.1 °C (98.8 °F)  Pulse:  [72-85] 75  Resp:  [15-19] 16  BP: (102-153)/(56-76) 104/57  SpO2:  [90 %-100 %] 90 %    Physical Exam  Vitals and nursing note reviewed.   Constitutional:       General: He is not in acute distress.     Appearance: He is not ill-appearing.   HENT:      Head: Normocephalic and atraumatic.      Right Ear: External ear normal.      Left Ear: External ear normal.      Nose: Nose normal.   Eyes:      General:         Right eye: No discharge.         Left eye: No discharge.      Conjunctiva/sclera: Conjunctivae normal.   Cardiovascular:      Rate and Rhythm: Normal rate and regular rhythm.      Heart sounds: No murmur heard.  Pulmonary:      Effort: Pulmonary effort is normal. No respiratory distress.      Breath sounds: Normal breath sounds. No wheezing.   Musculoskeletal:         General: No tenderness or deformity.      Right lower leg: No edema.      Left lower leg: No edema.   Skin:     General: Skin is warm.   Neurological:      General: No focal deficit present.      Mental Status: He is alert and oriented to person, place, and time.   Psychiatric:         Mood and Affect: Mood normal.         Behavior: Behavior normal.         Fluids    Intake/Output Summary (Last 24 hours) at 8/31/2024 1227  Last data filed at 8/30/2024 2102  Gross per 24 hour   Intake 600 ml   Output  "2000 ml   Net -1400 ml       Laboratory  Recent Labs     08/30/24  0058 08/31/24  0652   WBC 6.5 5.1   RBC 4.32* 3.50*   HEMOGLOBIN 12.3* 9.9*   HEMATOCRIT 37.7* 30.9*   MCV 87.3 88.3   MCH 28.5 28.3   MCHC 32.6 32.0*   RDW 51.4* 53.1*   PLATELETCT 155* 129*   MPV 10.3 9.8     Recent Labs     08/30/24  0404 08/30/24  1003 08/31/24  0652   SODIUM 133* 134* 135   POTASSIUM 4.4 4.7 4.9   CHLORIDE 94* 91* 93*   CO2 20 23 28   GLUCOSE 78 155* 76   BUN 54* 58* 32*   CREATININE 9.77* 9.89* 6.82*   CALCIUM 9.1 8.5 8.5         No results for input(s): \"NTPROBNP\" in the last 72 hours.        Imaging  CT-ABDOMEN-PELVIS W/O   Final Result         1.  Fluid-filled prominence of small bowel, consider ileus and/or enteritis   2.  Moderate layering right pleural effusion. Trace left pleural effusion.   3.  Right lower lobe consolidation, consider atelectasis versus infiltrate   4.  Hepatomegaly   5.  Atherosclerosis   6.  Large volume of abdominal ascites   7.  Atherosclerosis including atherosclerotic coronary artery disease            Assessment/Plan  1 ESRD  2 COVID 19  3 Anemia  4 Hyperkalemia  no acute need for HD today  JEANINE with dialysis  Renal diet  Daily BMP, CBC.  Renal dose all meds  Avoid nephrotoxins like NSAIDs.  D/W Dr Torres                 "

## 2024-08-31 NOTE — CARE PLAN
The patient is Stable - Low risk of patient condition declining or worsening    Shift Goals  Clinical Goals: hemodynamic stability, monitor abnormal labs  Patient Goals: rest  Family Goals: ariadne    Progress made toward(s) clinical / shift goals:      Problem: Fall Risk  Goal: Patient will remain free from falls  Outcome: Progressing  Note: Pt fall risk assessment completed. Appropriate interventions put in place based on fall risk assessment. Pt educated on use of call light and fall preventions. Pt verbalized understanding.    Problem: Respiratory  Goal: Patient will achieve/maintain optimum respiratory ventilation and gas exchange  Outcome: Progressing  Note: Respiratory assessment complete, see flowsheets. O2 saturation monitored, pt not requiring O2 during shift. Pt educated on turn, cough, deep breath technique. Pt educated on importance of oral hygiene to prevent worsening respiratory infection.    Problem: Hemodynamics  Goal: Patient's hemodynamics, fluid balance and neurologic status will be stable or improve  Note: Vital signs and O2 sat monitored throughout shift. BP maintained within ordered parameters, no PRNs administered during shift. Continuous tele monitor in place throughout shift. I/Os monitored throughout shift. Assessment of circulation complete including peripheral pulses, color, mental status, temperature, capillary refill, and signs of bleeding. Fistula site clean, dry, intact after dialysis.      Patient is not progressing towards the following goals:

## 2024-09-01 VITALS
DIASTOLIC BLOOD PRESSURE: 61 MMHG | TEMPERATURE: 97.7 F | OXYGEN SATURATION: 93 % | BODY MASS INDEX: 17.27 KG/M2 | HEART RATE: 66 BPM | HEIGHT: 68 IN | SYSTOLIC BLOOD PRESSURE: 108 MMHG | WEIGHT: 113.98 LBS | RESPIRATION RATE: 16 BRPM

## 2024-09-01 PROBLEM — D69.6 THROMBOCYTOPENIA (HCC): Status: ACTIVE | Noted: 2024-09-01

## 2024-09-01 LAB — GLUCOSE BLD STRIP.AUTO-MCNC: 197 MG/DL (ref 65–99)

## 2024-09-01 PROCEDURE — 700102 HCHG RX REV CODE 250 W/ 637 OVERRIDE(OP): Performed by: STUDENT IN AN ORGANIZED HEALTH CARE EDUCATION/TRAINING PROGRAM

## 2024-09-01 PROCEDURE — A9270 NON-COVERED ITEM OR SERVICE: HCPCS | Performed by: STUDENT IN AN ORGANIZED HEALTH CARE EDUCATION/TRAINING PROGRAM

## 2024-09-01 PROCEDURE — 82962 GLUCOSE BLOOD TEST: CPT

## 2024-09-01 PROCEDURE — 99239 HOSP IP/OBS DSCHRG MGMT >30: CPT | Performed by: INTERNAL MEDICINE

## 2024-09-01 PROCEDURE — 700102 HCHG RX REV CODE 250 W/ 637 OVERRIDE(OP): Performed by: INTERNAL MEDICINE

## 2024-09-01 PROCEDURE — 99232 SBSQ HOSP IP/OBS MODERATE 35: CPT | Performed by: INTERNAL MEDICINE

## 2024-09-01 PROCEDURE — A9270 NON-COVERED ITEM OR SERVICE: HCPCS | Performed by: INTERNAL MEDICINE

## 2024-09-01 RX ORDER — DEXAMETHASONE 6 MG/1
6 TABLET ORAL DAILY
Qty: 8 TABLET | Refills: 0 | Status: SHIPPED | OUTPATIENT
Start: 2024-09-02 | End: 2024-09-01

## 2024-09-01 RX ADMIN — DEXAMETHASONE 6 MG: 4 TABLET ORAL at 05:32

## 2024-09-01 RX ADMIN — ASPIRIN 81 MG: 81 TABLET, COATED ORAL at 05:32

## 2024-09-01 RX ADMIN — OMEPRAZOLE 20 MG: 20 CAPSULE, DELAYED RELEASE ORAL at 11:31

## 2024-09-01 ASSESSMENT — ENCOUNTER SYMPTOMS
CHILLS: 0
COUGH: 0
FEVER: 0
VOMITING: 0
NAUSEA: 0
SHORTNESS OF BREATH: 0

## 2024-09-01 ASSESSMENT — FIBROSIS 4 INDEX: FIB4 SCORE: 2.65

## 2024-09-01 NOTE — CARE PLAN
The patient is Stable - Low risk of patient condition declining or worsening    Shift Goals  Clinical Goals: hemodynamic stability, montor O2 sats, IS use  Patient Goals: get better, rest  Family Goals: ariadne    Progress made toward(s) clinical / shift goals:      Problem: Respiratory:  Goal: Respiratory status will improve  Outcome: Progressing  Note: Respiratory assessment complete, see flowsheets. O2 saturation monitored and oxygen titrated per physician order, pt stable on 2LNC. Pt educated on use of IS, demonstrated understanding.       Problem: Knowledge Deficit  Goal: Knowledge of the prescribed therapeutic regimen will improve  Outcome: Progressing  Note: Provided education on medications administered during shift. Pt verbalized understanding.     Problem: Hemodynamics  Goal: Patient's hemodynamics, fluid balance and neurologic status will be stable or improve  Outcome: Progressing  Note: Vital signs and O2 sat monitored throughout shift. BP maintained within ordered parameters. Continuous tele monitor in place throughout shift. I/Os monitored throughout shift. Assessment of circulation complete, see flowsheets.         Patient is not progressing towards the following goals:

## 2024-09-01 NOTE — DIETARY
"Nutrition services: Day 2 of admit.  Zack Gale is a 59 y.o. male with admitting DX of hyperkalemia.    Consult received for FTT; wt loss of 2-13 lbs in 1 month per admit screen (MST score of 1); BMI <19. No poor PO reported. Unable to meet with pt at bedside as per department guidelines dietary staff is not entering COVID isolation rooms at this time. Normal appearance per MD H&P, cachexia noted per MD note yesterday. Unable to perform nutrition focused physical exam at this time.     Assessment:  Height: 172.7 cm (5' 8\")  Weight: 51.7 kg (113 lb 15.7 oz)  Body mass index is 17.33 kg/m²., BMI classification: underweight  Diet/Intake: Renal; PO % for two meals thus far    Evaluation:   ESRD, pt receives HD M/W/F.  Labs and meds reviewed.     Malnutrition Risk: Unable to determine at this time.    Recommendations/Plan:  Encourage intake of meals.  Document intake of all meals as % taken in ADLs to provide interdisciplinary communication across all shifts.   Monitor weight.  Nutrition rep will continue to see patient for ongoing meal and snack preferences.  Nepro or Novasource Renal oral nutrition supplement between meals to optimize nutrition.    RD will follow per dept guidelines.          "

## 2024-09-01 NOTE — DISCHARGE SUMMARY
Discharge Summary    CHIEF COMPLAINT ON ADMISSION  Chief Complaint   Patient presents with    Leg Pain     BLE pain x4 days. Pt says it hurts to walk and he has been feeling intermittent dizziness. Pt missed dialysis Monday due to leg pain.     Cough     Onset today.        Reason for Admission  Leg Pain     Admission Date  8/29/2024    CODE STATUS  Full Code    HPI & HOSPITAL COURSE  Zack Gale 59-year-old male with PMHx ESRD on HD MWF, CAD s/p CABG, HTN.  Admitted 8/29 for missed dialysis secondary to flulike symptoms.  In the emergency room patient was noted to be febrile with a low-grade fever 100.6.  Tachycardic.  Potassium 6.1.  And COVID-positive.  He was not hypoxic and did not require supplemental oxygen.  Nephrology was consulted from the emergency room for HD. Patient was briefly hypoxic on 08/31 and was monitored over night. Home O2 eval on 09/01 demonstrated no need for home supplemental oxygen. Potassium improved. Patient improved clinically and was agreeable to discharge. Patient was determined satisfactory for discharge with appropriate follow up.    Therefore, he is discharged in fair and stable condition to home with close outpatient follow-up.    The patient met 2-midnight criteria for an inpatient stay at the time of discharge.    Discharge Date  09/01/2024    FOLLOW UP ITEMS POST DISCHARGE  Patient was seen and examined at bedside.  No acute events overnight. Patient is resting comfortably in bed and in no acute distress.     DISCHARGE DIAGNOSES  Principal Problem:    Acute hypoxemic respiratory failure due to COVID-19 (HCC) (POA: Unknown)  Active Problems:    Hyperkalemia (POA: Yes)    End-stage renal disease on hemodialysis (HCC) (Chronic) (POA: Yes)    Bicytopenia (POA: Unknown)    Moderate protein-calorie malnutrition (HCC) (POA: Unknown)    Thrombocytopenia (HCC) (POA: Unknown)    Anemia due to chronic kidney disease, on chronic dialysis (HCC) (POA: Yes)    Coronary artery disease  involving native coronary artery of native heart without angina pectoris (POA: Yes)    Essential (primary) hypertension (Chronic) (POA: Yes)  Resolved Problems:    * No resolved hospital problems. *      FOLLOW UP  No future appointments.  RICHIE Meade  [address] [facility]  [place] [address]  [phone]    Follow up in 1 week(s)        MEDICATIONS ON DISCHARGE     Medication List        START taking these medications        Instructions   omeprazole 20 MG delayed-release capsule  Commonly known as: PriLOSEC   Take 1 Capsule by mouth every day.  Dose: 20 mg            CONTINUE taking these medications        Instructions   NIFEdipine SR 90 MG CR tablet  Commonly known as: Procadia-XL   Take 90 mg by mouth every day.  Dose: 90 mg            STOP taking these medications      aspirin effervescent 325 MG Tbef  Commonly known as: Edilma-[place]              Allergies  No Known Allergies    DIET  Orders Placed This Encounter   Procedures    Diet Order Diet: Renal; Second Modifier: (optional): 2 Gram Sodium     Standing Status:   Standing     Number of Occurrences:   1     Order Specific Question:   Diet:     Answer:   Renal [8]     Order Specific Question:   Second Modifier: (optional)     Answer:   2 Gram Sodium [7]         LABORATORY  Lab Results   Component Value Date    SODIUM 135 08/31/2024    POTASSIUM 4.9 08/31/2024    CHLORIDE 93 (L) 08/31/2024    CO2 28 08/31/2024    GLUCOSE 76 08/31/2024    BUN 32 (H) 08/31/2024    CREATININE 6.82 (HH) 08/31/2024    CREATININE 0.7 05/11/2009        Lab Results   Component Value Date    WBC 5.1 08/31/2024    HEMOGLOBIN 9.9 (L) 08/31/2024    HEMATOCRIT 30.9 (L) 08/31/2024    PLATELETCT 129 (L) 08/31/2024        Total time of the discharge process exceeds 42 minutes.

## 2024-09-01 NOTE — PROGRESS NOTES
Bedside report received by RN and patient care assumed. Tele Box in place, patient is A&O4, resting in bed, and on 1LNC. Patient states 0/10 pain. Fall precautions in place and patient educated on use of call light for assistance. Pt updated on POC with no questions or concerns. Hourly monitoring initiated.

## 2024-09-01 NOTE — PROGRESS NOTES
Patient discharged. A&O x 4. Discharge instructions, personal belongings in possession of a patient. PIV and tele monitor removed.  Copy of discharge instructions in the patient chart, signed and reviewed with patient and patient's son. Patient verbalizes the understanding of the discharge instructions. Questions / concerns addressed prior to leaving the unit. Patient is instructed to follow up with PCP.Transported via wheelchair.  Patient is discharged to home.

## 2024-09-01 NOTE — PROGRESS NOTES
Bedside report received from NOC RN. Assumed care of pt. Pt awake, laying in bed. A/Ox4, VSS. No concerns, complaints or distress. Pt educated to call before getting out of bed. POC reviewed and white board updated. Tele box on. SR 61 on the monitor. Call light in reach. Bed locked in lowest position with 2 upper bed rails up. Bed alarm on.

## 2024-09-01 NOTE — PROGRESS NOTES
Nephrology Daily Progress Note    Date of Service  9/1/2024    Chief Complaint  59 y.o. male admitted 8/29/2024 with cough, diagnosed with Covid 19 infection    Interval Problem Update  C/o cough, no SOB  9/1 patient is doing better, he is excited to go home  Review of Systems  Review of Systems   Constitutional:  Negative for chills, fever and malaise/fatigue.   Respiratory:  Negative for cough and shortness of breath.    Cardiovascular:  Negative for chest pain and leg swelling.   Gastrointestinal:  Negative for nausea and vomiting.   Genitourinary:  Negative for dysuria, frequency and urgency.        Physical Exam  Temp:  [36.4 °C (97.5 °F)-37.3 °C (99.1 °F)] 36.5 °C (97.7 °F)  Pulse:  [61-74] 66  Resp:  [14-18] 16  BP: (108-133)/(60-78) 108/61  SpO2:  [88 %-96 %] 93 %    Physical Exam  Vitals and nursing note reviewed.   Constitutional:       General: He is not in acute distress.     Appearance: He is not ill-appearing.   HENT:      Head: Normocephalic and atraumatic.      Right Ear: External ear normal.      Left Ear: External ear normal.      Nose: Nose normal.   Eyes:      General:         Right eye: No discharge.         Left eye: No discharge.      Conjunctiva/sclera: Conjunctivae normal.   Cardiovascular:      Rate and Rhythm: Normal rate and regular rhythm.      Heart sounds: No murmur heard.  Pulmonary:      Effort: Pulmonary effort is normal. No respiratory distress.      Breath sounds: Normal breath sounds. No wheezing.   Musculoskeletal:         General: No tenderness or deformity.      Right lower leg: No edema.      Left lower leg: No edema.   Skin:     General: Skin is warm.   Neurological:      General: No focal deficit present.      Mental Status: He is alert and oriented to person, place, and time.   Psychiatric:         Mood and Affect: Mood normal.         Behavior: Behavior normal.         Fluids    Intake/Output Summary (Last 24 hours) at 9/1/2024 1321  Last data filed at 9/1/2024  "0100  Gross per 24 hour   Intake 100 ml   Output --   Net 100 ml       Laboratory  Recent Labs     08/30/24  0058 08/31/24  0652   WBC 6.5 5.1   RBC 4.32* 3.50*   HEMOGLOBIN 12.3* 9.9*   HEMATOCRIT 37.7* 30.9*   MCV 87.3 88.3   MCH 28.5 28.3   MCHC 32.6 32.0*   RDW 51.4* 53.1*   PLATELETCT 155* 129*   MPV 10.3 9.8     Recent Labs     08/30/24  0404 08/30/24  1003 08/31/24  0652   SODIUM 133* 134* 135   POTASSIUM 4.4 4.7 4.9   CHLORIDE 94* 91* 93*   CO2 20 23 28   GLUCOSE 78 155* 76   BUN 54* 58* 32*   CREATININE 9.77* 9.89* 6.82*   CALCIUM 9.1 8.5 8.5         No results for input(s): \"NTPROBNP\" in the last 72 hours.        Imaging  CT-ABDOMEN-PELVIS W/O   Final Result         1.  Fluid-filled prominence of small bowel, consider ileus and/or enteritis   2.  Moderate layering right pleural effusion. Trace left pleural effusion.   3.  Right lower lobe consolidation, consider atelectasis versus infiltrate   4.  Hepatomegaly   5.  Atherosclerosis   6.  Large volume of abdominal ascites   7.  Atherosclerosis including atherosclerotic coronary artery disease            Assessment/Plan  1 ESRD  2 COVID 19  3 Anemia  4 Hyperkalemia  no acute need for HD today, plan for tomorrow as an outpatient  JEANINE with dialysis  Renal diet  Daily BMP, CBC.  Renal dose all meds  Avoid nephrotoxins like NSAIDs.                     "

## 2024-09-01 NOTE — PROGRESS NOTES
Monitor summary:        Rhythm: SR   Rate: 72-76  Ectopy: (R)PVC, (r)PAC  Measurements: 16./.15/.45        12hr chart check

## 2024-10-08 ENCOUNTER — HOSPITAL ENCOUNTER (INPATIENT)
Facility: MEDICAL CENTER | Age: 59
LOS: 2 days | DRG: 193 | End: 2024-10-10
Attending: STUDENT IN AN ORGANIZED HEALTH CARE EDUCATION/TRAINING PROGRAM | Admitting: STUDENT IN AN ORGANIZED HEALTH CARE EDUCATION/TRAINING PROGRAM
Payer: COMMERCIAL

## 2024-10-08 ENCOUNTER — APPOINTMENT (OUTPATIENT)
Dept: RADIOLOGY | Facility: MEDICAL CENTER | Age: 59
DRG: 193 | End: 2024-10-08
Attending: STUDENT IN AN ORGANIZED HEALTH CARE EDUCATION/TRAINING PROGRAM
Payer: COMMERCIAL

## 2024-10-08 DIAGNOSIS — O22.30: ICD-10-CM

## 2024-10-08 DIAGNOSIS — I25.10 CORONARY ARTERY DISEASE INVOLVING NATIVE CORONARY ARTERY OF NATIVE HEART WITHOUT ANGINA PECTORIS: ICD-10-CM

## 2024-10-08 DIAGNOSIS — J18.9 PNEUMONIA DUE TO INFECTIOUS ORGANISM, UNSPECIFIED LATERALITY, UNSPECIFIED PART OF LUNG: ICD-10-CM

## 2024-10-08 PROBLEM — I16.0 HYPERTENSIVE URGENCY: Status: ACTIVE | Noted: 2024-10-08

## 2024-10-08 PROBLEM — J96.01 ACUTE HYPOXEMIC RESPIRATORY FAILURE (HCC): Status: ACTIVE | Noted: 2024-10-08

## 2024-10-08 PROBLEM — R79.89 ELEVATED TROPONIN: Status: ACTIVE | Noted: 2024-10-08

## 2024-10-08 LAB
ALBUMIN SERPL BCP-MCNC: 3.9 G/DL (ref 3.2–4.9)
ALBUMIN/GLOB SERPL: 1.2 G/DL
ALP SERPL-CCNC: 168 U/L (ref 30–99)
ALT SERPL-CCNC: 11 U/L (ref 2–50)
ANION GAP SERPL CALC-SCNC: 11 MMOL/L (ref 7–16)
AST SERPL-CCNC: 25 U/L (ref 12–45)
BASOPHILS # BLD AUTO: 0.5 % (ref 0–1.8)
BASOPHILS # BLD: 0.04 K/UL (ref 0–0.12)
BILIRUB SERPL-MCNC: 0.7 MG/DL (ref 0.1–1.5)
BUN SERPL-MCNC: 33 MG/DL (ref 8–22)
CALCIUM ALBUM COR SERPL-MCNC: 9.1 MG/DL (ref 8.5–10.5)
CALCIUM SERPL-MCNC: 9 MG/DL (ref 8.5–10.5)
CHLORIDE SERPL-SCNC: 94 MMOL/L (ref 96–112)
CO2 SERPL-SCNC: 31 MMOL/L (ref 20–33)
CREAT SERPL-MCNC: 5.51 MG/DL (ref 0.5–1.4)
CRP SERPL HS-MCNC: 1.9 MG/DL (ref 0–0.75)
EKG IMPRESSION: NORMAL
EOSINOPHIL # BLD AUTO: 0.09 K/UL (ref 0–0.51)
EOSINOPHIL NFR BLD: 1.1 % (ref 0–6.9)
ERYTHROCYTE [DISTWIDTH] IN BLOOD BY AUTOMATED COUNT: 51 FL (ref 35.9–50)
ERYTHROCYTE [SEDIMENTATION RATE] IN BLOOD BY WESTERGREN METHOD: 15 MM/HOUR (ref 0–20)
FLUAV RNA SPEC QL NAA+PROBE: NEGATIVE
FLUBV RNA SPEC QL NAA+PROBE: NEGATIVE
GFR SERPLBLD CREATININE-BSD FMLA CKD-EPI: 11 ML/MIN/1.73 M 2
GLOBULIN SER CALC-MCNC: 3.3 G/DL (ref 1.9–3.5)
GLUCOSE SERPL-MCNC: 66 MG/DL (ref 65–99)
HCT VFR BLD AUTO: 27.6 % (ref 42–52)
HGB BLD-MCNC: 9 G/DL (ref 14–18)
IMM GRANULOCYTES # BLD AUTO: 0.03 K/UL (ref 0–0.11)
IMM GRANULOCYTES NFR BLD AUTO: 0.4 % (ref 0–0.9)
INR PPP: 1.21 (ref 0.87–1.13)
LACTATE SERPL-SCNC: 1.4 MMOL/L (ref 0.5–2)
LYMPHOCYTES # BLD AUTO: 1.18 K/UL (ref 1–4.8)
LYMPHOCYTES NFR BLD: 14.2 % (ref 22–41)
MAGNESIUM SERPL-MCNC: 2.1 MG/DL (ref 1.5–2.5)
MCH RBC QN AUTO: 28.8 PG (ref 27–33)
MCHC RBC AUTO-ENTMCNC: 32.6 G/DL (ref 32.3–36.5)
MCV RBC AUTO: 88.5 FL (ref 81.4–97.8)
MONOCYTES # BLD AUTO: 0.5 K/UL (ref 0–0.85)
MONOCYTES NFR BLD AUTO: 6 % (ref 0–13.4)
NEUTROPHILS # BLD AUTO: 6.45 K/UL (ref 1.82–7.42)
NEUTROPHILS NFR BLD: 77.8 % (ref 44–72)
NRBC # BLD AUTO: 0 K/UL
NRBC BLD-RTO: 0 /100 WBC (ref 0–0.2)
NT-PROBNP SERPL IA-MCNC: ABNORMAL PG/ML (ref 0–125)
PLATELET # BLD AUTO: 123 K/UL (ref 164–446)
PMV BLD AUTO: 9.7 FL (ref 9–12.9)
POTASSIUM SERPL-SCNC: 4.9 MMOL/L (ref 3.6–5.5)
PROCALCITONIN SERPL-MCNC: 2.38 NG/ML
PROT SERPL-MCNC: 7.2 G/DL (ref 6–8.2)
PROTHROMBIN TIME: 15.3 SEC (ref 12–14.6)
RBC # BLD AUTO: 3.12 M/UL (ref 4.7–6.1)
RSV RNA SPEC QL NAA+PROBE: NEGATIVE
SARS-COV-2 RNA RESP QL NAA+PROBE: NOTDETECTED
SODIUM SERPL-SCNC: 136 MMOL/L (ref 135–145)
TROPONIN T SERPL-MCNC: 113 NG/L (ref 6–19)
TROPONIN T SERPL-MCNC: 147 NG/L (ref 6–19)
WBC # BLD AUTO: 8.3 K/UL (ref 4.8–10.8)

## 2024-10-08 PROCEDURE — A9270 NON-COVERED ITEM OR SERVICE: HCPCS | Performed by: STUDENT IN AN ORGANIZED HEALTH CARE EDUCATION/TRAINING PROGRAM

## 2024-10-08 PROCEDURE — 0241U HCHG SARS-COV-2 COVID-19 NFCT DS RESP RNA 4 TRGT ED POC: CPT

## 2024-10-08 PROCEDURE — 36415 COLL VENOUS BLD VENIPUNCTURE: CPT

## 2024-10-08 PROCEDURE — 99223 1ST HOSP IP/OBS HIGH 75: CPT | Mod: AI | Performed by: STUDENT IN AN ORGANIZED HEALTH CARE EDUCATION/TRAINING PROGRAM

## 2024-10-08 PROCEDURE — 700111 HCHG RX REV CODE 636 W/ 250 OVERRIDE (IP): Mod: JZ | Performed by: STUDENT IN AN ORGANIZED HEALTH CARE EDUCATION/TRAINING PROGRAM

## 2024-10-08 PROCEDURE — 83605 ASSAY OF LACTIC ACID: CPT

## 2024-10-08 PROCEDURE — 99285 EMERGENCY DEPT VISIT HI MDM: CPT

## 2024-10-08 PROCEDURE — 93005 ELECTROCARDIOGRAM TRACING: CPT

## 2024-10-08 PROCEDURE — 84484 ASSAY OF TROPONIN QUANT: CPT | Mod: 91

## 2024-10-08 PROCEDURE — 770020 HCHG ROOM/CARE - TELE (206)

## 2024-10-08 PROCEDURE — 83735 ASSAY OF MAGNESIUM: CPT

## 2024-10-08 PROCEDURE — 96375 TX/PRO/DX INJ NEW DRUG ADDON: CPT

## 2024-10-08 PROCEDURE — 700111 HCHG RX REV CODE 636 W/ 250 OVERRIDE (IP): Performed by: STUDENT IN AN ORGANIZED HEALTH CARE EDUCATION/TRAINING PROGRAM

## 2024-10-08 PROCEDURE — 96365 THER/PROPH/DIAG IV INF INIT: CPT

## 2024-10-08 PROCEDURE — 700105 HCHG RX REV CODE 258: Performed by: STUDENT IN AN ORGANIZED HEALTH CARE EDUCATION/TRAINING PROGRAM

## 2024-10-08 PROCEDURE — 83880 ASSAY OF NATRIURETIC PEPTIDE: CPT

## 2024-10-08 PROCEDURE — 700102 HCHG RX REV CODE 250 W/ 637 OVERRIDE(OP): Performed by: STUDENT IN AN ORGANIZED HEALTH CARE EDUCATION/TRAINING PROGRAM

## 2024-10-08 PROCEDURE — 71045 X-RAY EXAM CHEST 1 VIEW: CPT

## 2024-10-08 PROCEDURE — 84145 PROCALCITONIN (PCT): CPT

## 2024-10-08 PROCEDURE — 85025 COMPLETE CBC W/AUTO DIFF WBC: CPT

## 2024-10-08 PROCEDURE — 85610 PROTHROMBIN TIME: CPT

## 2024-10-08 PROCEDURE — 85652 RBC SED RATE AUTOMATED: CPT

## 2024-10-08 PROCEDURE — 80053 COMPREHEN METABOLIC PANEL: CPT

## 2024-10-08 PROCEDURE — 93005 ELECTROCARDIOGRAM TRACING: CPT | Performed by: STUDENT IN AN ORGANIZED HEALTH CARE EDUCATION/TRAINING PROGRAM

## 2024-10-08 PROCEDURE — 86140 C-REACTIVE PROTEIN: CPT

## 2024-10-08 RX ORDER — ONDANSETRON 2 MG/ML
4 INJECTION INTRAMUSCULAR; INTRAVENOUS EVERY 4 HOURS PRN
Status: DISCONTINUED | OUTPATIENT
Start: 2024-10-08 | End: 2024-10-10 | Stop reason: HOSPADM

## 2024-10-08 RX ORDER — PROCHLORPERAZINE EDISYLATE 5 MG/ML
5-10 INJECTION INTRAMUSCULAR; INTRAVENOUS EVERY 4 HOURS PRN
Status: DISCONTINUED | OUTPATIENT
Start: 2024-10-08 | End: 2024-10-10 | Stop reason: HOSPADM

## 2024-10-08 RX ORDER — LIDOCAINE/PRILOCAINE 2.5 %-2.5%
CREAM (GRAM) TOPICAL
Status: DISCONTINUED | OUTPATIENT
Start: 2024-10-08 | End: 2024-10-10 | Stop reason: HOSPADM

## 2024-10-08 RX ORDER — SODIUM CHLORIDE 9 MG/ML
100 INJECTION, SOLUTION INTRAVENOUS
Status: DISCONTINUED | OUTPATIENT
Start: 2024-10-08 | End: 2024-10-10 | Stop reason: HOSPADM

## 2024-10-08 RX ORDER — BENZONATATE 100 MG/1
100 CAPSULE ORAL 3 TIMES DAILY PRN
Status: DISCONTINUED | OUTPATIENT
Start: 2024-10-08 | End: 2024-10-10 | Stop reason: HOSPADM

## 2024-10-08 RX ORDER — HEPARIN SODIUM 5000 [USP'U]/ML
5000 INJECTION, SOLUTION INTRAVENOUS; SUBCUTANEOUS EVERY 8 HOURS
Status: DISCONTINUED | OUTPATIENT
Start: 2024-10-08 | End: 2024-10-09

## 2024-10-08 RX ORDER — ONDANSETRON 4 MG/1
4 TABLET, ORALLY DISINTEGRATING ORAL EVERY 4 HOURS PRN
Status: DISCONTINUED | OUTPATIENT
Start: 2024-10-08 | End: 2024-10-10 | Stop reason: HOSPADM

## 2024-10-08 RX ORDER — AMOXICILLIN 250 MG
2 CAPSULE ORAL EVERY EVENING
Status: DISCONTINUED | OUTPATIENT
Start: 2024-10-08 | End: 2024-10-09

## 2024-10-08 RX ORDER — ASPIRIN 81 MG/1
81 TABLET ORAL DAILY
Status: DISCONTINUED | OUTPATIENT
Start: 2024-10-09 | End: 2024-10-10 | Stop reason: HOSPADM

## 2024-10-08 RX ORDER — NIFEDIPINE 30 MG/1
90 TABLET, EXTENDED RELEASE ORAL NIGHTLY
Status: DISCONTINUED | OUTPATIENT
Start: 2024-10-08 | End: 2024-10-10 | Stop reason: HOSPADM

## 2024-10-08 RX ORDER — PROMETHAZINE HYDROCHLORIDE 25 MG/1
12.5-25 TABLET ORAL EVERY 4 HOURS PRN
Status: DISCONTINUED | OUTPATIENT
Start: 2024-10-08 | End: 2024-10-10 | Stop reason: HOSPADM

## 2024-10-08 RX ORDER — HYDRALAZINE HYDROCHLORIDE 20 MG/ML
10 INJECTION INTRAMUSCULAR; INTRAVENOUS EVERY 4 HOURS PRN
Status: DISCONTINUED | OUTPATIENT
Start: 2024-10-08 | End: 2024-10-10 | Stop reason: HOSPADM

## 2024-10-08 RX ORDER — LABETALOL HYDROCHLORIDE 5 MG/ML
10 INJECTION, SOLUTION INTRAVENOUS ONCE
Status: DISCONTINUED | OUTPATIENT
Start: 2024-10-08 | End: 2024-10-08

## 2024-10-08 RX ORDER — IPRATROPIUM BROMIDE AND ALBUTEROL SULFATE 2.5; .5 MG/3ML; MG/3ML
3 SOLUTION RESPIRATORY (INHALATION)
Status: DISCONTINUED | OUTPATIENT
Start: 2024-10-08 | End: 2024-10-10 | Stop reason: HOSPADM

## 2024-10-08 RX ORDER — LABETALOL HYDROCHLORIDE 5 MG/ML
10 INJECTION, SOLUTION INTRAVENOUS EVERY 4 HOURS PRN
Status: DISCONTINUED | OUTPATIENT
Start: 2024-10-08 | End: 2024-10-10 | Stop reason: HOSPADM

## 2024-10-08 RX ORDER — ACETAMINOPHEN 325 MG/1
650 TABLET ORAL EVERY 6 HOURS PRN
Status: DISCONTINUED | OUTPATIENT
Start: 2024-10-08 | End: 2024-10-10 | Stop reason: HOSPADM

## 2024-10-08 RX ORDER — AZITHROMYCIN 250 MG/1
500 TABLET, FILM COATED ORAL DAILY
Status: DISCONTINUED | OUTPATIENT
Start: 2024-10-09 | End: 2024-10-08

## 2024-10-08 RX ORDER — DEXTROSE MONOHYDRATE 25 G/50ML
25 INJECTION, SOLUTION INTRAVENOUS
Status: DISCONTINUED | OUTPATIENT
Start: 2024-10-08 | End: 2024-10-08

## 2024-10-08 RX ORDER — HYDRALAZINE HYDROCHLORIDE 20 MG/ML
20 INJECTION INTRAMUSCULAR; INTRAVENOUS ONCE
Status: COMPLETED | OUTPATIENT
Start: 2024-10-08 | End: 2024-10-08

## 2024-10-08 RX ORDER — ATORVASTATIN CALCIUM 40 MG/1
40 TABLET, FILM COATED ORAL EVERY EVENING
Status: DISCONTINUED | OUTPATIENT
Start: 2024-10-08 | End: 2024-10-10 | Stop reason: HOSPADM

## 2024-10-08 RX ORDER — INSULIN LISPRO 100 [IU]/ML
2-9 INJECTION, SOLUTION INTRAVENOUS; SUBCUTANEOUS
Status: DISCONTINUED | OUTPATIENT
Start: 2024-10-09 | End: 2024-10-10 | Stop reason: HOSPADM

## 2024-10-08 RX ORDER — GUAIFENESIN/DEXTROMETHORPHAN 100-10MG/5
10 SYRUP ORAL EVERY 6 HOURS PRN
Status: DISCONTINUED | OUTPATIENT
Start: 2024-10-08 | End: 2024-10-10 | Stop reason: HOSPADM

## 2024-10-08 RX ORDER — CARVEDILOL 3.12 MG/1
3.12 TABLET ORAL 2 TIMES DAILY WITH MEALS
Status: DISCONTINUED | OUTPATIENT
Start: 2024-10-09 | End: 2024-10-10 | Stop reason: HOSPADM

## 2024-10-08 RX ORDER — DOXYCYCLINE 100 MG/1
100 TABLET ORAL EVERY 12 HOURS
Status: DISCONTINUED | OUTPATIENT
Start: 2024-10-08 | End: 2024-10-10 | Stop reason: HOSPADM

## 2024-10-08 RX ORDER — PROMETHAZINE HYDROCHLORIDE 25 MG/1
12.5-25 SUPPOSITORY RECTAL EVERY 4 HOURS PRN
Status: DISCONTINUED | OUTPATIENT
Start: 2024-10-08 | End: 2024-10-10 | Stop reason: HOSPADM

## 2024-10-08 RX ORDER — AZITHROMYCIN 250 MG/1
500 TABLET, FILM COATED ORAL ONCE
Status: COMPLETED | OUTPATIENT
Start: 2024-10-08 | End: 2024-10-08

## 2024-10-08 RX ORDER — DEXTROSE MONOHYDRATE 25 G/50ML
25 INJECTION, SOLUTION INTRAVENOUS
Status: DISCONTINUED | OUTPATIENT
Start: 2024-10-08 | End: 2024-10-10 | Stop reason: HOSPADM

## 2024-10-08 RX ORDER — POLYETHYLENE GLYCOL 3350 17 G/17G
1 POWDER, FOR SOLUTION ORAL
Status: DISCONTINUED | OUTPATIENT
Start: 2024-10-08 | End: 2024-10-09

## 2024-10-08 RX ADMIN — HEPARIN SODIUM 5000 UNITS: 5000 INJECTION, SOLUTION INTRAVENOUS; SUBCUTANEOUS at 22:55

## 2024-10-08 RX ADMIN — AMPICILLIN AND SULBACTAM 3 G: 1; 2 INJECTION, POWDER, FOR SOLUTION INTRAMUSCULAR; INTRAVENOUS at 20:27

## 2024-10-08 RX ADMIN — AZITHROMYCIN DIHYDRATE 500 MG: 250 TABLET ORAL at 20:25

## 2024-10-08 RX ADMIN — HYDRALAZINE HYDROCHLORIDE 20 MG: 20 INJECTION, SOLUTION INTRAMUSCULAR; INTRAVENOUS at 20:27

## 2024-10-08 RX ADMIN — ACETAMINOPHEN 650 MG: 325 TABLET ORAL at 21:58

## 2024-10-08 RX ADMIN — DOXYCYCLINE 100 MG: 100 TABLET, FILM COATED ORAL at 22:52

## 2024-10-08 RX ADMIN — NIFEDIPINE 90 MG: 30 TABLET, FILM COATED, EXTENDED RELEASE ORAL at 21:57

## 2024-10-08 RX ADMIN — ATORVASTATIN CALCIUM 40 MG: 40 TABLET, FILM COATED ORAL at 21:57

## 2024-10-08 RX ADMIN — TIOTROPIUM BROMIDE INHALATION SPRAY 5 MCG: 3.12 SPRAY, METERED RESPIRATORY (INHALATION) at 22:54

## 2024-10-08 RX ADMIN — MOMETASONE FUROATE AND FORMOTEROL FUMARATE DIHYDRATE 2 PUFF: 200; 5 AEROSOL RESPIRATORY (INHALATION) at 22:54

## 2024-10-08 ASSESSMENT — ENCOUNTER SYMPTOMS
DIZZINESS: 0
BRUISES/BLEEDS EASILY: 0
CHILLS: 0
WEAKNESS: 1
FEVER: 0
HEARTBURN: 0
FOCAL WEAKNESS: 0
ABDOMINAL PAIN: 0
MYALGIAS: 1
WHEEZING: 0
HEMOPTYSIS: 0
PALPITATIONS: 0
DOUBLE VISION: 0
COUGH: 1
SPUTUM PRODUCTION: 1
BLURRED VISION: 0
DEPRESSION: 0
NECK PAIN: 0
HEADACHES: 0
SHORTNESS OF BREATH: 1
NAUSEA: 0
VOMITING: 0

## 2024-10-08 ASSESSMENT — HEART SCORE
RISK FACTORS: >2 RISK FACTORS OR HX OF ATHEROSCLEROTIC DISEASE
AGE: 45-64
HISTORY: SLIGHTLY SUSPICIOUS
ECG: NON-SPECIFIC REPOLARIZATION DISTURBANCE
HEART SCORE: 6
TROPONIN: GREATER THAN OR EQUAL TO 3 TIMES NORMAL LIMIT

## 2024-10-08 ASSESSMENT — LIFESTYLE VARIABLES: SUBSTANCE_ABUSE: 0

## 2024-10-08 ASSESSMENT — PAIN DESCRIPTION - PAIN TYPE: TYPE: ACUTE PAIN

## 2024-10-08 ASSESSMENT — FIBROSIS 4 INDEX
FIB4 SCORE: 2.65
FIB4 SCORE: 3.49

## 2024-10-09 ENCOUNTER — APPOINTMENT (OUTPATIENT)
Dept: CARDIOLOGY | Facility: MEDICAL CENTER | Age: 59
DRG: 193 | End: 2024-10-09
Attending: INTERNAL MEDICINE
Payer: COMMERCIAL

## 2024-10-09 LAB
ALBUMIN SERPL BCP-MCNC: 3.4 G/DL (ref 3.2–4.9)
ALBUMIN/GLOB SERPL: 1 G/DL
ALP SERPL-CCNC: 152 U/L (ref 30–99)
ALT SERPL-CCNC: 10 U/L (ref 2–50)
ANION GAP SERPL CALC-SCNC: 12 MMOL/L (ref 7–16)
APTT PPP: 37.2 SEC (ref 24.7–36)
AST SERPL-CCNC: 24 U/L (ref 12–45)
BASOPHILS # BLD AUTO: 0.7 % (ref 0–1.8)
BASOPHILS # BLD: 0.07 K/UL (ref 0–0.12)
BILIRUB SERPL-MCNC: 0.7 MG/DL (ref 0.1–1.5)
BUN SERPL-MCNC: 38 MG/DL (ref 8–22)
CALCIUM ALBUM COR SERPL-MCNC: 9.5 MG/DL (ref 8.5–10.5)
CALCIUM SERPL-MCNC: 9 MG/DL (ref 8.5–10.5)
CHLORIDE SERPL-SCNC: 94 MMOL/L (ref 96–112)
CO2 SERPL-SCNC: 29 MMOL/L (ref 20–33)
CREAT SERPL-MCNC: 5.96 MG/DL (ref 0.5–1.4)
EOSINOPHIL # BLD AUTO: 0.04 K/UL (ref 0–0.51)
EOSINOPHIL NFR BLD: 0.4 % (ref 0–6.9)
ERYTHROCYTE [DISTWIDTH] IN BLOOD BY AUTOMATED COUNT: 51.9 FL (ref 35.9–50)
GFR SERPLBLD CREATININE-BSD FMLA CKD-EPI: 10 ML/MIN/1.73 M 2
GLOBULIN SER CALC-MCNC: 3.3 G/DL (ref 1.9–3.5)
GLUCOSE BLD STRIP.AUTO-MCNC: 114 MG/DL (ref 65–99)
GLUCOSE BLD STRIP.AUTO-MCNC: 154 MG/DL (ref 65–99)
GLUCOSE BLD STRIP.AUTO-MCNC: 46 MG/DL (ref 65–99)
GLUCOSE BLD STRIP.AUTO-MCNC: 72 MG/DL (ref 65–99)
GLUCOSE BLD STRIP.AUTO-MCNC: 90 MG/DL (ref 65–99)
GLUCOSE SERPL-MCNC: 44 MG/DL (ref 65–99)
HCT VFR BLD AUTO: 26.4 % (ref 42–52)
HGB BLD-MCNC: 8.8 G/DL (ref 14–18)
IMM GRANULOCYTES # BLD AUTO: 0.04 K/UL (ref 0–0.11)
IMM GRANULOCYTES NFR BLD AUTO: 0.4 % (ref 0–0.9)
INR PPP: 1.3 (ref 0.87–1.13)
LV EJECT FRACT  99904: 61
LV EJECT FRACT MOD 2C 99903: 57.56
LV EJECT FRACT MOD 4C 99902: 63.74
LV EJECT FRACT MOD BP 99901: 60.76
LYMPHOCYTES # BLD AUTO: 1.61 K/UL (ref 1–4.8)
LYMPHOCYTES NFR BLD: 15.2 % (ref 22–41)
MAGNESIUM SERPL-MCNC: 2.3 MG/DL (ref 1.5–2.5)
MCH RBC QN AUTO: 29.5 PG (ref 27–33)
MCHC RBC AUTO-ENTMCNC: 33.3 G/DL (ref 32.3–36.5)
MCV RBC AUTO: 88.6 FL (ref 81.4–97.8)
MONOCYTES # BLD AUTO: 0.71 K/UL (ref 0–0.85)
MONOCYTES NFR BLD AUTO: 6.7 % (ref 0–13.4)
NEUTROPHILS # BLD AUTO: 8.15 K/UL (ref 1.82–7.42)
NEUTROPHILS NFR BLD: 76.6 % (ref 44–72)
NRBC # BLD AUTO: 0 K/UL
NRBC BLD-RTO: 0 /100 WBC (ref 0–0.2)
PHOSPHATE SERPL-MCNC: 3 MG/DL (ref 2.5–4.5)
PLATELET # BLD AUTO: 113 K/UL (ref 164–446)
PMV BLD AUTO: 10.2 FL (ref 9–12.9)
POTASSIUM SERPL-SCNC: 4.5 MMOL/L (ref 3.6–5.5)
PROT SERPL-MCNC: 6.7 G/DL (ref 6–8.2)
PROTHROMBIN TIME: 16.2 SEC (ref 12–14.6)
RBC # BLD AUTO: 2.98 M/UL (ref 4.7–6.1)
SODIUM SERPL-SCNC: 135 MMOL/L (ref 135–145)
TROPONIN T SERPL-MCNC: 259 NG/L (ref 6–19)
TROPONIN T SERPL-MCNC: 339 NG/L (ref 6–19)
UFH PPP CHRO-ACNC: 0.12 IU/ML
UFH PPP CHRO-ACNC: <0.1 IU/ML
WBC # BLD AUTO: 10.6 K/UL (ref 4.8–10.8)

## 2024-10-09 PROCEDURE — 700102 HCHG RX REV CODE 250 W/ 637 OVERRIDE(OP): Performed by: INTERNAL MEDICINE

## 2024-10-09 PROCEDURE — A9270 NON-COVERED ITEM OR SERVICE: HCPCS | Performed by: INTERNAL MEDICINE

## 2024-10-09 PROCEDURE — 700111 HCHG RX REV CODE 636 W/ 250 OVERRIDE (IP)

## 2024-10-09 PROCEDURE — 700111 HCHG RX REV CODE 636 W/ 250 OVERRIDE (IP): Mod: JZ | Performed by: INTERNAL MEDICINE

## 2024-10-09 PROCEDURE — 770020 HCHG ROOM/CARE - TELE (206)

## 2024-10-09 PROCEDURE — 93306 TTE W/DOPPLER COMPLETE: CPT | Mod: 26 | Performed by: STUDENT IN AN ORGANIZED HEALTH CARE EDUCATION/TRAINING PROGRAM

## 2024-10-09 PROCEDURE — 99233 SBSQ HOSP IP/OBS HIGH 50: CPT | Performed by: INTERNAL MEDICINE

## 2024-10-09 PROCEDURE — 700101 HCHG RX REV CODE 250: Performed by: STUDENT IN AN ORGANIZED HEALTH CARE EDUCATION/TRAINING PROGRAM

## 2024-10-09 PROCEDURE — 99222 1ST HOSP IP/OBS MODERATE 55: CPT | Performed by: INTERNAL MEDICINE

## 2024-10-09 PROCEDURE — 84100 ASSAY OF PHOSPHORUS: CPT

## 2024-10-09 PROCEDURE — 700102 HCHG RX REV CODE 250 W/ 637 OVERRIDE(OP): Performed by: STUDENT IN AN ORGANIZED HEALTH CARE EDUCATION/TRAINING PROGRAM

## 2024-10-09 PROCEDURE — A9270 NON-COVERED ITEM OR SERVICE: HCPCS

## 2024-10-09 PROCEDURE — 85520 HEPARIN ASSAY: CPT

## 2024-10-09 PROCEDURE — 700105 HCHG RX REV CODE 258: Performed by: STUDENT IN AN ORGANIZED HEALTH CARE EDUCATION/TRAINING PROGRAM

## 2024-10-09 PROCEDURE — 93306 TTE W/DOPPLER COMPLETE: CPT

## 2024-10-09 PROCEDURE — A9270 NON-COVERED ITEM OR SERVICE: HCPCS | Performed by: STUDENT IN AN ORGANIZED HEALTH CARE EDUCATION/TRAINING PROGRAM

## 2024-10-09 PROCEDURE — 80053 COMPREHEN METABOLIC PANEL: CPT

## 2024-10-09 PROCEDURE — 85610 PROTHROMBIN TIME: CPT

## 2024-10-09 PROCEDURE — 700111 HCHG RX REV CODE 636 W/ 250 OVERRIDE (IP): Mod: JZ | Performed by: STUDENT IN AN ORGANIZED HEALTH CARE EDUCATION/TRAINING PROGRAM

## 2024-10-09 PROCEDURE — 84484 ASSAY OF TROPONIN QUANT: CPT | Mod: 91

## 2024-10-09 PROCEDURE — 83735 ASSAY OF MAGNESIUM: CPT

## 2024-10-09 PROCEDURE — 700111 HCHG RX REV CODE 636 W/ 250 OVERRIDE (IP): Mod: JZ,JG

## 2024-10-09 PROCEDURE — 700102 HCHG RX REV CODE 250 W/ 637 OVERRIDE(OP)

## 2024-10-09 PROCEDURE — 700105 HCHG RX REV CODE 258

## 2024-10-09 PROCEDURE — 82962 GLUCOSE BLOOD TEST: CPT | Mod: 91

## 2024-10-09 PROCEDURE — 85025 COMPLETE CBC W/AUTO DIFF WBC: CPT

## 2024-10-09 PROCEDURE — 85730 THROMBOPLASTIN TIME PARTIAL: CPT

## 2024-10-09 PROCEDURE — 90935 HEMODIALYSIS ONE EVALUATION: CPT

## 2024-10-09 RX ORDER — HEPARIN SODIUM 1000 [USP'U]/ML
60 INJECTION, SOLUTION INTRAVENOUS; SUBCUTANEOUS ONCE
Status: COMPLETED | OUTPATIENT
Start: 2024-10-09 | End: 2024-10-09

## 2024-10-09 RX ORDER — HEPARIN SODIUM 5000 [USP'U]/100ML
0-30 INJECTION, SOLUTION INTRAVENOUS CONTINUOUS
Status: DISCONTINUED | OUTPATIENT
Start: 2024-10-09 | End: 2024-10-09

## 2024-10-09 RX ORDER — HEPARIN SODIUM 1000 [USP'U]/ML
30 INJECTION, SOLUTION INTRAVENOUS; SUBCUTANEOUS PRN
Status: DISCONTINUED | OUTPATIENT
Start: 2024-10-09 | End: 2024-10-09

## 2024-10-09 RX ORDER — HYDRALAZINE HYDROCHLORIDE 25 MG/1
25 TABLET, FILM COATED ORAL EVERY 8 HOURS
Status: DISCONTINUED | OUTPATIENT
Start: 2024-10-09 | End: 2024-10-10 | Stop reason: HOSPADM

## 2024-10-09 RX ORDER — OXYCODONE HYDROCHLORIDE 5 MG/1
5 TABLET ORAL ONCE
Status: COMPLETED | OUTPATIENT
Start: 2024-10-09 | End: 2024-10-09

## 2024-10-09 RX ORDER — FUROSEMIDE 10 MG/ML
40 INJECTION INTRAMUSCULAR; INTRAVENOUS
Status: DISCONTINUED | OUTPATIENT
Start: 2024-10-09 | End: 2024-10-09

## 2024-10-09 RX ORDER — METHOXY POLYETHYLENE GLYCOL-EPOETIN BETA 50 UG/.3ML
50 INJECTION, SOLUTION INTRAVENOUS
COMMUNITY

## 2024-10-09 RX ORDER — QUETIAPINE FUMARATE 25 MG/1
25 TABLET, FILM COATED ORAL NIGHTLY PRN
Status: DISCONTINUED | OUTPATIENT
Start: 2024-10-09 | End: 2024-10-09

## 2024-10-09 RX ADMIN — HEPARIN SODIUM 12 UNITS/KG/HR: 1000 INJECTION, SOLUTION INTRAVENOUS; SUBCUTANEOUS at 06:08

## 2024-10-09 RX ADMIN — AMPICILLIN AND SULBACTAM 3 G: 1; 2 INJECTION, POWDER, FOR SOLUTION INTRAMUSCULAR; INTRAVENOUS at 22:02

## 2024-10-09 RX ADMIN — ONDANSETRON 4 MG: 2 INJECTION INTRAMUSCULAR; INTRAVENOUS at 08:03

## 2024-10-09 RX ADMIN — CARVEDILOL 3.12 MG: 3.12 TABLET, FILM COATED ORAL at 17:03

## 2024-10-09 RX ADMIN — DEXTROSE MONOHYDRATE 25 G: 25 INJECTION, SOLUTION INTRAVENOUS at 05:01

## 2024-10-09 RX ADMIN — DOXYCYCLINE 100 MG: 100 TABLET, FILM COATED ORAL at 06:10

## 2024-10-09 RX ADMIN — OMEPRAZOLE 20 MG: 20 CAPSULE, DELAYED RELEASE ORAL at 06:10

## 2024-10-09 RX ADMIN — HEPARIN SODIUM 3100 UNITS: 1000 INJECTION, SOLUTION INTRAVENOUS; SUBCUTANEOUS at 06:04

## 2024-10-09 RX ADMIN — FUROSEMIDE 40 MG: 10 INJECTION, SOLUTION INTRAVENOUS at 07:56

## 2024-10-09 RX ADMIN — OXYCODONE HYDROCHLORIDE 5 MG: 5 TABLET ORAL at 06:10

## 2024-10-09 RX ADMIN — ASPIRIN 81 MG: 81 TABLET, COATED ORAL at 06:10

## 2024-10-09 RX ADMIN — MOMETASONE FUROATE AND FORMOTEROL FUMARATE DIHYDRATE 2 PUFF: 200; 5 AEROSOL RESPIRATORY (INHALATION) at 07:57

## 2024-10-09 RX ADMIN — NIFEDIPINE 90 MG: 30 TABLET, FILM COATED, EXTENDED RELEASE ORAL at 22:05

## 2024-10-09 RX ADMIN — ATORVASTATIN CALCIUM 40 MG: 40 TABLET, FILM COATED ORAL at 17:03

## 2024-10-09 RX ADMIN — HYDRALAZINE HYDROCHLORIDE 25 MG: 25 TABLET ORAL at 07:56

## 2024-10-09 RX ADMIN — EPOETIN ALFA-EPBX 3000 UNITS: 3000 INJECTION, SOLUTION INTRAVENOUS; SUBCUTANEOUS at 10:40

## 2024-10-09 RX ADMIN — TIOTROPIUM BROMIDE INHALATION SPRAY 5 MCG: 3.12 SPRAY, METERED RESPIRATORY (INHALATION) at 07:57

## 2024-10-09 RX ADMIN — HEPARIN SODIUM 1600 UNITS: 1000 INJECTION, SOLUTION INTRAVENOUS; SUBCUTANEOUS at 13:48

## 2024-10-09 RX ADMIN — MOMETASONE FUROATE AND FORMOTEROL FUMARATE DIHYDRATE 2 PUFF: 200; 5 AEROSOL RESPIRATORY (INHALATION) at 22:07

## 2024-10-09 RX ADMIN — DOXYCYCLINE 100 MG: 100 TABLET, FILM COATED ORAL at 17:03

## 2024-10-09 RX ADMIN — HYDRALAZINE HYDROCHLORIDE 25 MG: 25 TABLET ORAL at 22:05

## 2024-10-09 ASSESSMENT — ENCOUNTER SYMPTOMS
CHILLS: 0
ORTHOPNEA: 1
FEVER: 0
DIARRHEA: 1
DEPRESSION: 0
DIZZINESS: 0
ABDOMINAL PAIN: 0
HEADACHES: 0
VOMITING: 0
SHORTNESS OF BREATH: 1
MYALGIAS: 0
NAUSEA: 0
BACK PAIN: 1

## 2024-10-09 ASSESSMENT — FIBROSIS 4 INDEX: FIB4 SCORE: 3.83

## 2024-10-10 ENCOUNTER — PHARMACY VISIT (OUTPATIENT)
Dept: PHARMACY | Facility: MEDICAL CENTER | Age: 59
End: 2024-10-10
Payer: COMMERCIAL

## 2024-10-10 VITALS
HEIGHT: 68 IN | WEIGHT: 116.84 LBS | BODY MASS INDEX: 17.71 KG/M2 | SYSTOLIC BLOOD PRESSURE: 110 MMHG | TEMPERATURE: 97.5 F | OXYGEN SATURATION: 93 % | RESPIRATION RATE: 20 BRPM | DIASTOLIC BLOOD PRESSURE: 54 MMHG | HEART RATE: 68 BPM

## 2024-10-10 LAB
ALBUMIN SERPL BCP-MCNC: 3.5 G/DL (ref 3.2–4.9)
BUN SERPL-MCNC: 23 MG/DL (ref 8–22)
CALCIUM ALBUM COR SERPL-MCNC: 9.1 MG/DL (ref 8.5–10.5)
CALCIUM SERPL-MCNC: 8.7 MG/DL (ref 8.5–10.5)
CHLORIDE SERPL-SCNC: 97 MMOL/L (ref 96–112)
CO2 SERPL-SCNC: 30 MMOL/L (ref 20–33)
CREAT SERPL-MCNC: 4.27 MG/DL (ref 0.5–1.4)
ERYTHROCYTE [DISTWIDTH] IN BLOOD BY AUTOMATED COUNT: 52.5 FL (ref 35.9–50)
GFR SERPLBLD CREATININE-BSD FMLA CKD-EPI: 15 ML/MIN/1.73 M 2
GLUCOSE BLD STRIP.AUTO-MCNC: 147 MG/DL (ref 65–99)
GLUCOSE BLD STRIP.AUTO-MCNC: 162 MG/DL (ref 65–99)
GLUCOSE BLD STRIP.AUTO-MCNC: 88 MG/DL (ref 65–99)
GLUCOSE BLD STRIP.AUTO-MCNC: 88 MG/DL (ref 65–99)
GLUCOSE SERPL-MCNC: 94 MG/DL (ref 65–99)
HCT VFR BLD AUTO: 24.1 % (ref 42–52)
HGB BLD-MCNC: 7.9 G/DL (ref 14–18)
MCH RBC QN AUTO: 29.5 PG (ref 27–33)
MCHC RBC AUTO-ENTMCNC: 32.8 G/DL (ref 32.3–36.5)
MCV RBC AUTO: 89.9 FL (ref 81.4–97.8)
PHOSPHATE SERPL-MCNC: 3.3 MG/DL (ref 2.5–4.5)
PLATELET # BLD AUTO: 105 K/UL (ref 164–446)
PMV BLD AUTO: 10.2 FL (ref 9–12.9)
POTASSIUM SERPL-SCNC: 4.4 MMOL/L (ref 3.6–5.5)
RBC # BLD AUTO: 2.68 M/UL (ref 4.7–6.1)
SODIUM SERPL-SCNC: 137 MMOL/L (ref 135–145)
WBC # BLD AUTO: 8.3 K/UL (ref 4.8–10.8)

## 2024-10-10 PROCEDURE — A9270 NON-COVERED ITEM OR SERVICE: HCPCS | Performed by: STUDENT IN AN ORGANIZED HEALTH CARE EDUCATION/TRAINING PROGRAM

## 2024-10-10 PROCEDURE — A9270 NON-COVERED ITEM OR SERVICE: HCPCS | Performed by: INTERNAL MEDICINE

## 2024-10-10 PROCEDURE — 700102 HCHG RX REV CODE 250 W/ 637 OVERRIDE(OP): Performed by: INTERNAL MEDICINE

## 2024-10-10 PROCEDURE — 82962 GLUCOSE BLOOD TEST: CPT | Mod: 91

## 2024-10-10 PROCEDURE — 85027 COMPLETE CBC AUTOMATED: CPT

## 2024-10-10 PROCEDURE — RXMED WILLOW AMBULATORY MEDICATION CHARGE: Performed by: INTERNAL MEDICINE

## 2024-10-10 PROCEDURE — 700102 HCHG RX REV CODE 250 W/ 637 OVERRIDE(OP): Performed by: STUDENT IN AN ORGANIZED HEALTH CARE EDUCATION/TRAINING PROGRAM

## 2024-10-10 PROCEDURE — 80069 RENAL FUNCTION PANEL: CPT

## 2024-10-10 PROCEDURE — 99232 SBSQ HOSP IP/OBS MODERATE 35: CPT | Performed by: INTERNAL MEDICINE

## 2024-10-10 PROCEDURE — 99239 HOSP IP/OBS DSCHRG MGMT >30: CPT | Performed by: INTERNAL MEDICINE

## 2024-10-10 RX ORDER — ASPIRIN 81 MG/1
81 TABLET ORAL DAILY
Qty: 30 TABLET | Refills: 0 | Status: SHIPPED | OUTPATIENT
Start: 2024-10-11

## 2024-10-10 RX ORDER — HYDRALAZINE HYDROCHLORIDE 25 MG/1
25 TABLET, FILM COATED ORAL EVERY 8 HOURS
Qty: 90 TABLET | Refills: 0 | Status: SHIPPED | OUTPATIENT
Start: 2024-10-10

## 2024-10-10 RX ORDER — AMOXICILLIN AND CLAVULANATE POTASSIUM 500; 125 MG/1; MG/1
1 TABLET, FILM COATED ORAL EVERY EVENING
Qty: 5 TABLET | Refills: 0 | Status: ACTIVE | OUTPATIENT
Start: 2024-10-10 | End: 2024-10-15

## 2024-10-10 RX ORDER — DOXYCYCLINE 100 MG/1
100 TABLET ORAL EVERY 12 HOURS
Qty: 6 TABLET | Refills: 0 | Status: ACTIVE | OUTPATIENT
Start: 2024-10-10 | End: 2024-10-13

## 2024-10-10 RX ORDER — ATORVASTATIN CALCIUM 40 MG/1
40 TABLET, FILM COATED ORAL EVERY EVENING
Qty: 30 TABLET | Refills: 0 | Status: SHIPPED | OUTPATIENT
Start: 2024-10-10

## 2024-10-10 RX ORDER — AMOXICILLIN AND CLAVULANATE POTASSIUM 500; 125 MG/1; MG/1
1 TABLET, FILM COATED ORAL EVERY EVENING
Status: DISCONTINUED | OUTPATIENT
Start: 2024-10-10 | End: 2024-10-10 | Stop reason: HOSPADM

## 2024-10-10 RX ORDER — OXYCODONE HYDROCHLORIDE 5 MG/1
5 TABLET ORAL ONCE
Status: COMPLETED | OUTPATIENT
Start: 2024-10-10 | End: 2024-10-10

## 2024-10-10 RX ORDER — CARVEDILOL 3.12 MG/1
3.12 TABLET ORAL 2 TIMES DAILY WITH MEALS
Qty: 60 TABLET | Refills: 0 | Status: SHIPPED | OUTPATIENT
Start: 2024-10-10

## 2024-10-10 RX ADMIN — TIOTROPIUM BROMIDE INHALATION SPRAY 5 MCG: 3.12 SPRAY, METERED RESPIRATORY (INHALATION) at 08:00

## 2024-10-10 RX ADMIN — HYDRALAZINE HYDROCHLORIDE 25 MG: 25 TABLET ORAL at 05:33

## 2024-10-10 RX ADMIN — DOXYCYCLINE 100 MG: 100 TABLET, FILM COATED ORAL at 05:32

## 2024-10-10 RX ADMIN — ACETAMINOPHEN 650 MG: 325 TABLET ORAL at 09:03

## 2024-10-10 RX ADMIN — ASPIRIN 81 MG: 81 TABLET, COATED ORAL at 05:32

## 2024-10-10 RX ADMIN — OMEPRAZOLE 20 MG: 20 CAPSULE, DELAYED RELEASE ORAL at 05:33

## 2024-10-10 RX ADMIN — MOMETASONE FUROATE AND FORMOTEROL FUMARATE DIHYDRATE 2 PUFF: 200; 5 AEROSOL RESPIRATORY (INHALATION) at 08:31

## 2024-10-10 RX ADMIN — OXYCODONE 5 MG: 5 TABLET ORAL at 11:36

## 2024-10-10 ASSESSMENT — FIBROSIS 4 INDEX: FIB4 SCORE: 4.12

## 2025-01-01 ENCOUNTER — APPOINTMENT (OUTPATIENT)
Dept: RADIOLOGY | Facility: MEDICAL CENTER | Age: 60
End: 2025-01-01
Attending: NURSE PRACTITIONER
Payer: COMMERCIAL

## 2025-01-01 ENCOUNTER — APPOINTMENT (OUTPATIENT)
Dept: RADIOLOGY | Facility: MEDICAL CENTER | Age: 60
End: 2025-01-01
Attending: SURGERY
Payer: COMMERCIAL

## 2025-01-01 ENCOUNTER — APPOINTMENT (OUTPATIENT)
Dept: RADIOLOGY | Facility: MEDICAL CENTER | Age: 60
End: 2025-01-01
Attending: EMERGENCY MEDICINE
Payer: COMMERCIAL

## 2025-01-01 ENCOUNTER — HOSPITAL ENCOUNTER (INPATIENT)
Facility: MEDICAL CENTER | Age: 60
LOS: 8 days | End: 2025-01-23
Attending: EMERGENCY MEDICINE | Admitting: SURGERY
Payer: COMMERCIAL

## 2025-01-01 ENCOUNTER — APPOINTMENT (OUTPATIENT)
Dept: RADIOLOGY | Facility: MEDICAL CENTER | Age: 60
End: 2025-01-01
Attending: PHYSICIAN ASSISTANT
Payer: COMMERCIAL

## 2025-01-01 ENCOUNTER — APPOINTMENT (OUTPATIENT)
Dept: RADIOLOGY | Facility: MEDICAL CENTER | Age: 60
End: 2025-01-01
Attending: STUDENT IN AN ORGANIZED HEALTH CARE EDUCATION/TRAINING PROGRAM
Payer: COMMERCIAL

## 2025-01-01 VITALS
HEART RATE: 46 BPM | SYSTOLIC BLOOD PRESSURE: 77 MMHG | DIASTOLIC BLOOD PRESSURE: 49 MMHG | TEMPERATURE: 99 F | HEIGHT: 68 IN | RESPIRATION RATE: 14 BRPM | OXYGEN SATURATION: 80 % | BODY MASS INDEX: 16.44 KG/M2 | WEIGHT: 108.47 LBS

## 2025-01-01 DIAGNOSIS — S22.41XA CLOSED FRACTURE OF MULTIPLE RIBS OF RIGHT SIDE, INITIAL ENCOUNTER: ICD-10-CM

## 2025-01-01 LAB
ABO GROUP BLD: NORMAL
ABO GROUP BLD: NORMAL
ALBUMIN SERPL BCP-MCNC: 2.8 G/DL (ref 3.2–4.9)
ALBUMIN SERPL BCP-MCNC: 3.7 G/DL (ref 3.2–4.9)
ALBUMIN/GLOB SERPL: 1 G/DL
ALBUMIN/GLOB SERPL: 1 G/DL
ALP SERPL-CCNC: 170 U/L (ref 30–99)
ALP SERPL-CCNC: 91 U/L (ref 30–99)
ALT SERPL-CCNC: 6 U/L (ref 2–50)
ALT SERPL-CCNC: 9 U/L (ref 2–50)
ANION GAP SERPL CALC-SCNC: 12 MMOL/L (ref 7–16)
ANION GAP SERPL CALC-SCNC: 12 MMOL/L (ref 7–16)
ANION GAP SERPL CALC-SCNC: 13 MMOL/L (ref 7–16)
ANION GAP SERPL CALC-SCNC: 14 MMOL/L (ref 7–16)
ANION GAP SERPL CALC-SCNC: 15 MMOL/L (ref 7–16)
ANION GAP SERPL CALC-SCNC: 16 MMOL/L (ref 7–16)
ANION GAP SERPL CALC-SCNC: 17 MMOL/L (ref 7–16)
ANION GAP SERPL CALC-SCNC: 18 MMOL/L (ref 7–16)
ANISOCYTOSIS BLD QL SMEAR: ABNORMAL
APTT PPP: 31 SEC (ref 24.7–36)
APTT PPP: 33.8 SEC (ref 24.7–36)
APTT PPP: 51.9 SEC (ref 24.7–36)
ARTERIAL PATENCY WRIST A: ABNORMAL
AST SERPL-CCNC: 19 U/L (ref 12–45)
AST SERPL-CCNC: 26 U/L (ref 12–45)
BACTERIA FLD AEROBE CULT: NORMAL
BACTERIA SPEC RESP CULT: ABNORMAL
BACTERIA SPEC RESP CULT: ABNORMAL
BASE EXCESS BLDA CALC-SCNC: -1 MMOL/L (ref -4–3)
BASE EXCESS BLDA CALC-SCNC: -1 MMOL/L (ref -4–3)
BASE EXCESS BLDA CALC-SCNC: -5 MMOL/L (ref -4–3)
BASOPHILS # BLD AUTO: 0 % (ref 0–1.8)
BASOPHILS # BLD AUTO: 0.2 % (ref 0–1.8)
BASOPHILS # BLD AUTO: 0.3 % (ref 0–1.8)
BASOPHILS # BLD AUTO: 0.4 % (ref 0–1.8)
BASOPHILS # BLD AUTO: 0.4 % (ref 0–1.8)
BASOPHILS # BLD AUTO: 1 % (ref 0–1.8)
BASOPHILS # BLD: 0 K/UL (ref 0–0.12)
BASOPHILS # BLD: 0.01 K/UL (ref 0–0.12)
BASOPHILS # BLD: 0.01 K/UL (ref 0–0.12)
BASOPHILS # BLD: 0.02 K/UL (ref 0–0.12)
BASOPHILS # BLD: 0.06 K/UL (ref 0–0.12)
BILIRUB SERPL-MCNC: 1 MG/DL (ref 0.1–1.5)
BILIRUB SERPL-MCNC: 1 MG/DL (ref 0.1–1.5)
BLD GP AB SCN SERPL QL: NORMAL
BLD GP AB SCN SERPL QL: NORMAL
BODY TEMPERATURE: ABNORMAL DEGREES
BREATHS SETTING VENT: 18
BREATHS SETTING VENT: 20
BUN SERPL-MCNC: 22 MG/DL (ref 8–22)
BUN SERPL-MCNC: 26 MG/DL (ref 8–22)
BUN SERPL-MCNC: 27 MG/DL (ref 8–22)
BUN SERPL-MCNC: 28 MG/DL (ref 8–22)
BUN SERPL-MCNC: 30 MG/DL (ref 8–22)
BUN SERPL-MCNC: 32 MG/DL (ref 8–22)
BUN SERPL-MCNC: 36 MG/DL (ref 8–22)
BUN SERPL-MCNC: 37 MG/DL (ref 8–22)
BUN SERPL-MCNC: 43 MG/DL (ref 8–22)
BUN SERPL-MCNC: 45 MG/DL (ref 8–22)
BURR CELLS BLD QL SMEAR: NORMAL
CALCIUM ALBUM COR SERPL-MCNC: 10.3 MG/DL (ref 8.5–10.5)
CALCIUM ALBUM COR SERPL-MCNC: 9 MG/DL (ref 8.5–10.5)
CALCIUM SERPL-MCNC: 7.4 MG/DL (ref 8.5–10.5)
CALCIUM SERPL-MCNC: 7.6 MG/DL (ref 8.5–10.5)
CALCIUM SERPL-MCNC: 7.9 MG/DL (ref 8.5–10.5)
CALCIUM SERPL-MCNC: 8 MG/DL (ref 8.5–10.5)
CALCIUM SERPL-MCNC: 8 MG/DL (ref 8.5–10.5)
CALCIUM SERPL-MCNC: 8.1 MG/DL (ref 8.5–10.5)
CALCIUM SERPL-MCNC: 8.4 MG/DL (ref 8.5–10.5)
CALCIUM SERPL-MCNC: 8.6 MG/DL (ref 8.5–10.5)
CALCIUM SERPL-MCNC: 8.8 MG/DL (ref 8.5–10.5)
CALCIUM SERPL-MCNC: 9.3 MG/DL (ref 8.5–10.5)
CFT BLD TEG: 6.1 MIN (ref 4.6–9.1)
CFT BLD TEG: 6.5 MIN (ref 4.6–9.1)
CFT BLD TEG: 6.6 MIN (ref 4.6–9.1)
CFT P HPASE BLD TEG: 5.5 MIN (ref 4.3–8.3)
CFT P HPASE BLD TEG: 5.7 MIN (ref 4.3–8.3)
CFT P HPASE BLD TEG: 6 MIN (ref 4.3–8.3)
CHLORIDE SERPL-SCNC: 93 MMOL/L (ref 96–112)
CHLORIDE SERPL-SCNC: 94 MMOL/L (ref 96–112)
CHLORIDE SERPL-SCNC: 94 MMOL/L (ref 96–112)
CHLORIDE SERPL-SCNC: 95 MMOL/L (ref 96–112)
CHLORIDE SERPL-SCNC: 96 MMOL/L (ref 96–112)
CHLORIDE SERPL-SCNC: 96 MMOL/L (ref 96–112)
CHLORIDE SERPL-SCNC: 97 MMOL/L (ref 96–112)
CHLORIDE SERPL-SCNC: 98 MMOL/L (ref 96–112)
CLOT ANGLE BLD TEG: 66.4 DEGREES (ref 63–78)
CLOT ANGLE BLD TEG: 74.5 DEGREES (ref 63–78)
CLOT ANGLE BLD TEG: 74.7 DEGREES (ref 63–78)
CLOT LYSIS 30M P MA LENFR BLD TEG: 0 % (ref 0–2.6)
CLOT LYSIS 30M P MA LENFR BLD TEG: 0.1 % (ref 0–2.6)
CLOT LYSIS 30M P MA LENFR BLD TEG: 0.8 % (ref 0–2.6)
CO2 BLDA-SCNC: 22 MMOL/L (ref 32–48)
CO2 BLDA-SCNC: 23 MMOL/L (ref 32–48)
CO2 BLDA-SCNC: 26 MMOL/L (ref 32–48)
CO2 SERPL-SCNC: 19 MMOL/L (ref 20–33)
CO2 SERPL-SCNC: 20 MMOL/L (ref 20–33)
CO2 SERPL-SCNC: 20 MMOL/L (ref 20–33)
CO2 SERPL-SCNC: 25 MMOL/L (ref 20–33)
CO2 SERPL-SCNC: 25 MMOL/L (ref 20–33)
CO2 SERPL-SCNC: 26 MMOL/L (ref 20–33)
CO2 SERPL-SCNC: 27 MMOL/L (ref 20–33)
CO2 SERPL-SCNC: 28 MMOL/L (ref 20–33)
CO2 SERPL-SCNC: 29 MMOL/L (ref 20–33)
CO2 SERPL-SCNC: 30 MMOL/L (ref 20–33)
COMMENT NL1176: NORMAL
COMMENT NL1176: NORMAL
CORTIS SERPL-MCNC: 13.5 UG/DL (ref 0–23)
CORTIS SERPL-MCNC: 21.4 UG/DL (ref 0–23)
CREAT SERPL-MCNC: 3.11 MG/DL (ref 0.5–1.4)
CREAT SERPL-MCNC: 3.68 MG/DL (ref 0.5–1.4)
CREAT SERPL-MCNC: 4.01 MG/DL (ref 0.5–1.4)
CREAT SERPL-MCNC: 4.16 MG/DL (ref 0.5–1.4)
CREAT SERPL-MCNC: 4.24 MG/DL (ref 0.5–1.4)
CREAT SERPL-MCNC: 4.34 MG/DL (ref 0.5–1.4)
CREAT SERPL-MCNC: 5.03 MG/DL (ref 0.5–1.4)
CREAT SERPL-MCNC: 5.19 MG/DL (ref 0.5–1.4)
CREAT SERPL-MCNC: 5.33 MG/DL (ref 0.5–1.4)
CREAT SERPL-MCNC: 5.98 MG/DL (ref 0.5–1.4)
CRP SERPL HS-MCNC: 37 MG/DL (ref 0–0.75)
CT.EXTRINSIC BLD ROTEM: 1.2 MIN (ref 0.8–2.1)
CT.EXTRINSIC BLD ROTEM: 1.2 MIN (ref 0.8–2.1)
CT.EXTRINSIC BLD ROTEM: 2 MIN (ref 0.8–2.1)
DELSYS IDSYS: ABNORMAL
DOHLE BOD BLD QL SMEAR: NORMAL
EKG IMPRESSION: NORMAL
END TIDAL CARBON DIOXIDE IECO2: 29 MMHG
EOSINOPHIL # BLD AUTO: 0 K/UL (ref 0–0.51)
EOSINOPHIL # BLD AUTO: 0 K/UL (ref 0–0.51)
EOSINOPHIL # BLD AUTO: 0.01 K/UL (ref 0–0.51)
EOSINOPHIL # BLD AUTO: 0.02 K/UL (ref 0–0.51)
EOSINOPHIL # BLD AUTO: 0.02 K/UL (ref 0–0.51)
EOSINOPHIL # BLD AUTO: 0.04 K/UL (ref 0–0.51)
EOSINOPHIL # BLD AUTO: 0.13 K/UL (ref 0–0.51)
EOSINOPHIL # BLD AUTO: 0.14 K/UL (ref 0–0.51)
EOSINOPHIL NFR BLD: 0 % (ref 0–6.9)
EOSINOPHIL NFR BLD: 0 % (ref 0–6.9)
EOSINOPHIL NFR BLD: 0.2 % (ref 0–6.9)
EOSINOPHIL NFR BLD: 0.7 % (ref 0–6.9)
EOSINOPHIL NFR BLD: 0.8 % (ref 0–6.9)
EOSINOPHIL NFR BLD: 1.9 % (ref 0–6.9)
EOSINOPHIL NFR BLD: 2.2 % (ref 0–6.9)
EOSINOPHIL NFR BLD: 2.3 % (ref 0–6.9)
ERYTHROCYTE [DISTWIDTH] IN BLOOD BY AUTOMATED COUNT: 51.8 FL (ref 35.9–50)
ERYTHROCYTE [DISTWIDTH] IN BLOOD BY AUTOMATED COUNT: 52.1 FL (ref 35.9–50)
ERYTHROCYTE [DISTWIDTH] IN BLOOD BY AUTOMATED COUNT: 52.1 FL (ref 35.9–50)
ERYTHROCYTE [DISTWIDTH] IN BLOOD BY AUTOMATED COUNT: 53.1 FL (ref 35.9–50)
ERYTHROCYTE [DISTWIDTH] IN BLOOD BY AUTOMATED COUNT: 53.9 FL (ref 35.9–50)
ERYTHROCYTE [DISTWIDTH] IN BLOOD BY AUTOMATED COUNT: 54.4 FL (ref 35.9–50)
ERYTHROCYTE [DISTWIDTH] IN BLOOD BY AUTOMATED COUNT: 54.7 FL (ref 35.9–50)
ERYTHROCYTE [DISTWIDTH] IN BLOOD BY AUTOMATED COUNT: 55 FL (ref 35.9–50)
ERYTHROCYTE [DISTWIDTH] IN BLOOD BY AUTOMATED COUNT: 55.5 FL (ref 35.9–50)
GFR SERPLBLD CREATININE-BSD FMLA CKD-EPI: 10 ML/MIN/1.73 M 2
GFR SERPLBLD CREATININE-BSD FMLA CKD-EPI: 12 ML/MIN/1.73 M 2
GFR SERPLBLD CREATININE-BSD FMLA CKD-EPI: 12 ML/MIN/1.73 M 2
GFR SERPLBLD CREATININE-BSD FMLA CKD-EPI: 13 ML/MIN/1.73 M 2
GFR SERPLBLD CREATININE-BSD FMLA CKD-EPI: 15 ML/MIN/1.73 M 2
GFR SERPLBLD CREATININE-BSD FMLA CKD-EPI: 15 ML/MIN/1.73 M 2
GFR SERPLBLD CREATININE-BSD FMLA CKD-EPI: 16 ML/MIN/1.73 M 2
GFR SERPLBLD CREATININE-BSD FMLA CKD-EPI: 16 ML/MIN/1.73 M 2
GFR SERPLBLD CREATININE-BSD FMLA CKD-EPI: 18 ML/MIN/1.73 M 2
GFR SERPLBLD CREATININE-BSD FMLA CKD-EPI: 22 ML/MIN/1.73 M 2
GLOBULIN SER CALC-MCNC: 2.8 G/DL (ref 1.9–3.5)
GLOBULIN SER CALC-MCNC: 3.7 G/DL (ref 1.9–3.5)
GLUCOSE BLD STRIP.AUTO-MCNC: 100 MG/DL (ref 65–99)
GLUCOSE BLD STRIP.AUTO-MCNC: 101 MG/DL (ref 65–99)
GLUCOSE BLD STRIP.AUTO-MCNC: 101 MG/DL (ref 65–99)
GLUCOSE BLD STRIP.AUTO-MCNC: 103 MG/DL (ref 65–99)
GLUCOSE BLD STRIP.AUTO-MCNC: 104 MG/DL (ref 65–99)
GLUCOSE BLD STRIP.AUTO-MCNC: 106 MG/DL (ref 65–99)
GLUCOSE BLD STRIP.AUTO-MCNC: 107 MG/DL (ref 65–99)
GLUCOSE BLD STRIP.AUTO-MCNC: 108 MG/DL (ref 65–99)
GLUCOSE BLD STRIP.AUTO-MCNC: 111 MG/DL (ref 65–99)
GLUCOSE BLD STRIP.AUTO-MCNC: 117 MG/DL (ref 65–99)
GLUCOSE BLD STRIP.AUTO-MCNC: 117 MG/DL (ref 65–99)
GLUCOSE BLD STRIP.AUTO-MCNC: 125 MG/DL (ref 65–99)
GLUCOSE BLD STRIP.AUTO-MCNC: 128 MG/DL (ref 65–99)
GLUCOSE BLD STRIP.AUTO-MCNC: 135 MG/DL (ref 65–99)
GLUCOSE BLD STRIP.AUTO-MCNC: 14 MG/DL (ref 65–99)
GLUCOSE BLD STRIP.AUTO-MCNC: 156 MG/DL (ref 65–99)
GLUCOSE BLD STRIP.AUTO-MCNC: 158 MG/DL (ref 65–99)
GLUCOSE BLD STRIP.AUTO-MCNC: 158 MG/DL (ref 65–99)
GLUCOSE BLD STRIP.AUTO-MCNC: 198 MG/DL (ref 65–99)
GLUCOSE BLD STRIP.AUTO-MCNC: 32 MG/DL (ref 65–99)
GLUCOSE BLD STRIP.AUTO-MCNC: 46 MG/DL (ref 65–99)
GLUCOSE BLD STRIP.AUTO-MCNC: 46 MG/DL (ref 65–99)
GLUCOSE BLD STRIP.AUTO-MCNC: 55 MG/DL (ref 65–99)
GLUCOSE BLD STRIP.AUTO-MCNC: 56 MG/DL (ref 65–99)
GLUCOSE BLD STRIP.AUTO-MCNC: 59 MG/DL (ref 65–99)
GLUCOSE BLD STRIP.AUTO-MCNC: 62 MG/DL (ref 65–99)
GLUCOSE BLD STRIP.AUTO-MCNC: 65 MG/DL (ref 65–99)
GLUCOSE BLD STRIP.AUTO-MCNC: 66 MG/DL (ref 65–99)
GLUCOSE BLD STRIP.AUTO-MCNC: 67 MG/DL (ref 65–99)
GLUCOSE BLD STRIP.AUTO-MCNC: 67 MG/DL (ref 65–99)
GLUCOSE BLD STRIP.AUTO-MCNC: 68 MG/DL (ref 65–99)
GLUCOSE BLD STRIP.AUTO-MCNC: 72 MG/DL (ref 65–99)
GLUCOSE BLD STRIP.AUTO-MCNC: 75 MG/DL (ref 65–99)
GLUCOSE BLD STRIP.AUTO-MCNC: 80 MG/DL (ref 65–99)
GLUCOSE BLD STRIP.AUTO-MCNC: 81 MG/DL (ref 65–99)
GLUCOSE BLD STRIP.AUTO-MCNC: 89 MG/DL (ref 65–99)
GLUCOSE BLD STRIP.AUTO-MCNC: 91 MG/DL (ref 65–99)
GLUCOSE BLD STRIP.AUTO-MCNC: 92 MG/DL (ref 65–99)
GLUCOSE BLD STRIP.AUTO-MCNC: 98 MG/DL (ref 65–99)
GLUCOSE SERPL-MCNC: 106 MG/DL (ref 65–99)
GLUCOSE SERPL-MCNC: 122 MG/DL (ref 65–99)
GLUCOSE SERPL-MCNC: 137 MG/DL (ref 65–99)
GLUCOSE SERPL-MCNC: 206 MG/DL (ref 65–99)
GLUCOSE SERPL-MCNC: 52 MG/DL (ref 65–99)
GLUCOSE SERPL-MCNC: 60 MG/DL (ref 65–99)
GLUCOSE SERPL-MCNC: 67 MG/DL (ref 65–99)
GLUCOSE SERPL-MCNC: 72 MG/DL (ref 65–99)
GLUCOSE SERPL-MCNC: 78 MG/DL (ref 65–99)
GLUCOSE SERPL-MCNC: 82 MG/DL (ref 65–99)
GLUCOSE SERPL-MCNC: 88 MG/DL (ref 65–99)
GRAM STN SPEC: ABNORMAL
GRAM STN SPEC: NORMAL
HCO3 BLDA-SCNC: 20.9 MMOL/L (ref 21–28)
HCO3 BLDA-SCNC: 21.4 MMOL/L (ref 21–28)
HCO3 BLDA-SCNC: 24.7 MMOL/L (ref 21–28)
HCT VFR BLD AUTO: 25.3 % (ref 42–52)
HCT VFR BLD AUTO: 25.6 % (ref 42–52)
HCT VFR BLD AUTO: 27.2 % (ref 42–52)
HCT VFR BLD AUTO: 27.4 % (ref 42–52)
HCT VFR BLD AUTO: 27.7 % (ref 42–52)
HCT VFR BLD AUTO: 27.7 % (ref 42–52)
HCT VFR BLD AUTO: 27.8 % (ref 42–52)
HCT VFR BLD AUTO: 28.3 % (ref 42–52)
HCT VFR BLD AUTO: 28.9 % (ref 42–52)
HCT VFR BLD AUTO: 30.2 % (ref 42–52)
HCT VFR BLD AUTO: 30.3 % (ref 42–52)
HCT VFR BLD AUTO: 31.8 % (ref 42–52)
HCT VFR BLD AUTO: 32.8 % (ref 42–52)
HGB BLD-MCNC: 10.1 G/DL (ref 14–18)
HGB BLD-MCNC: 10.3 G/DL (ref 14–18)
HGB BLD-MCNC: 7.8 G/DL (ref 14–18)
HGB BLD-MCNC: 8.1 G/DL (ref 14–18)
HGB BLD-MCNC: 8.4 G/DL (ref 14–18)
HGB BLD-MCNC: 8.4 G/DL (ref 14–18)
HGB BLD-MCNC: 8.5 G/DL (ref 14–18)
HGB BLD-MCNC: 8.7 G/DL (ref 14–18)
HGB BLD-MCNC: 8.8 G/DL (ref 14–18)
HGB BLD-MCNC: 9 G/DL (ref 14–18)
HGB BLD-MCNC: 9.1 G/DL (ref 14–18)
HGB BLD-MCNC: 9.2 G/DL (ref 14–18)
HGB BLD-MCNC: 9.3 G/DL (ref 14–18)
HGB BLD-MCNC: 9.4 G/DL (ref 14–18)
HGB RETIC QN AUTO: 24.5 PG/CELL (ref 29–35)
HOROWITZ INDEX BLDA+IHG-RTO: 120 MM[HG]
HOROWITZ INDEX BLDA+IHG-RTO: 122 MM[HG]
HOROWITZ INDEX BLDA+IHG-RTO: 170 MM[HG]
HYPOCHROMIA BLD QL SMEAR: ABNORMAL
IMM GRANULOCYTES # BLD AUTO: 0 K/UL (ref 0–0.11)
IMM GRANULOCYTES # BLD AUTO: 0.01 K/UL (ref 0–0.11)
IMM GRANULOCYTES # BLD AUTO: 0.01 K/UL (ref 0–0.11)
IMM GRANULOCYTES # BLD AUTO: 0.02 K/UL (ref 0–0.11)
IMM GRANULOCYTES # BLD AUTO: 0.03 K/UL (ref 0–0.11)
IMM GRANULOCYTES NFR BLD AUTO: 0 % (ref 0–0.9)
IMM GRANULOCYTES NFR BLD AUTO: 0.2 % (ref 0–0.9)
IMM GRANULOCYTES NFR BLD AUTO: 0.3 % (ref 0–0.9)
IMM GRANULOCYTES NFR BLD AUTO: 0.3 % (ref 0–0.9)
IMM GRANULOCYTES NFR BLD AUTO: 0.5 % (ref 0–0.9)
IMM RETICS NFR: 23.8 % (ref 2.6–16.1)
INR PPP: 1.13 (ref 0.87–1.13)
INR PPP: 1.23 (ref 0.87–1.13)
INR PPP: 1.77 (ref 0.87–1.13)
IRON SATN MFR SERPL: 25 % (ref 15–55)
IRON SERPL-MCNC: 34 UG/DL (ref 50–180)
LACTATE BLD-SCNC: 6.6 MMOL/L (ref 0.5–2)
LACTATE SERPL-SCNC: 4.9 MMOL/L (ref 0.5–2)
LACTATE SERPL-SCNC: 6.6 MMOL/L (ref 0.5–2)
LYMPHOCYTES # BLD AUTO: 0.16 K/UL (ref 1–4.8)
LYMPHOCYTES # BLD AUTO: 0.26 K/UL (ref 1–4.8)
LYMPHOCYTES # BLD AUTO: 0.37 K/UL (ref 1–4.8)
LYMPHOCYTES # BLD AUTO: 0.42 K/UL (ref 1–4.8)
LYMPHOCYTES # BLD AUTO: 0.47 K/UL (ref 1–4.8)
LYMPHOCYTES # BLD AUTO: 0.92 K/UL (ref 1–4.8)
LYMPHOCYTES # BLD AUTO: 1.01 K/UL (ref 1–4.8)
LYMPHOCYTES # BLD AUTO: 1.78 K/UL (ref 1–4.8)
LYMPHOCYTES NFR BLD: 12 % (ref 22–41)
LYMPHOCYTES NFR BLD: 14.8 % (ref 22–41)
LYMPHOCYTES NFR BLD: 16.9 % (ref 22–41)
LYMPHOCYTES NFR BLD: 18.1 % (ref 22–41)
LYMPHOCYTES NFR BLD: 20.1 % (ref 22–41)
LYMPHOCYTES NFR BLD: 29.3 % (ref 22–41)
LYMPHOCYTES NFR BLD: 4.4 % (ref 22–41)
LYMPHOCYTES NFR BLD: 8.5 % (ref 22–41)
MACROCYTES BLD QL SMEAR: ABNORMAL
MAGNESIUM SERPL-MCNC: 1.9 MG/DL (ref 1.5–2.5)
MAGNESIUM SERPL-MCNC: 2.1 MG/DL (ref 1.5–2.5)
MANUAL DIFF BLD: NORMAL
MCF BLD TEG: 53.2 MM (ref 52–69)
MCF BLD TEG: 56.1 MM (ref 52–69)
MCF BLD TEG: 56.5 MM (ref 52–69)
MCF.PLATELET INHIB BLD ROTEM: 19 MM (ref 15–32)
MCF.PLATELET INHIB BLD ROTEM: 19.7 MM (ref 15–32)
MCF.PLATELET INHIB BLD ROTEM: 20.2 MM (ref 15–32)
MCH RBC QN AUTO: 26.1 PG (ref 27–33)
MCH RBC QN AUTO: 26.3 PG (ref 27–33)
MCH RBC QN AUTO: 26.4 PG (ref 27–33)
MCH RBC QN AUTO: 26.5 PG (ref 27–33)
MCH RBC QN AUTO: 26.5 PG (ref 27–33)
MCH RBC QN AUTO: 26.6 PG (ref 27–33)
MCH RBC QN AUTO: 26.6 PG (ref 27–33)
MCH RBC QN AUTO: 26.9 PG (ref 27–33)
MCH RBC QN AUTO: 26.9 PG (ref 27–33)
MCH RBC QN AUTO: 27.1 PG (ref 27–33)
MCH RBC QN AUTO: 28.4 PG (ref 27–33)
MCHC RBC AUTO-ENTMCNC: 30.3 G/DL (ref 32.3–36.5)
MCHC RBC AUTO-ENTMCNC: 30.5 G/DL (ref 32.3–36.5)
MCHC RBC AUTO-ENTMCNC: 30.7 G/DL (ref 32.3–36.5)
MCHC RBC AUTO-ENTMCNC: 30.7 G/DL (ref 32.3–36.5)
MCHC RBC AUTO-ENTMCNC: 30.8 G/DL (ref 32.3–36.5)
MCHC RBC AUTO-ENTMCNC: 31.1 G/DL (ref 32.3–36.5)
MCHC RBC AUTO-ENTMCNC: 31.1 G/DL (ref 32.3–36.5)
MCHC RBC AUTO-ENTMCNC: 31.3 G/DL (ref 32.3–36.5)
MCHC RBC AUTO-ENTMCNC: 32 G/DL (ref 32.3–36.5)
MCHC RBC AUTO-ENTMCNC: 32.4 G/DL (ref 32.3–36.5)
MCHC RBC AUTO-ENTMCNC: 32.4 G/DL (ref 32.3–36.5)
MCV RBC AUTO: 81.9 FL (ref 81.4–97.8)
MCV RBC AUTO: 83 FL (ref 81.4–97.8)
MCV RBC AUTO: 85 FL (ref 81.4–97.8)
MCV RBC AUTO: 85.8 FL (ref 81.4–97.8)
MCV RBC AUTO: 85.9 FL (ref 81.4–97.8)
MCV RBC AUTO: 86 FL (ref 81.4–97.8)
MCV RBC AUTO: 86.5 FL (ref 81.4–97.8)
MCV RBC AUTO: 86.6 FL (ref 81.4–97.8)
MCV RBC AUTO: 87 FL (ref 81.4–97.8)
MCV RBC AUTO: 87.5 FL (ref 81.4–97.8)
MCV RBC AUTO: 87.6 FL (ref 81.4–97.8)
METAMYELOCYTES NFR BLD MANUAL: 5.1 %
METAMYELOCYTES NFR BLD MANUAL: 7.4 %
METAMYELOCYTES NFR BLD MANUAL: 8 %
MICROCYTES BLD QL SMEAR: ABNORMAL
MODE IMODE: ABNORMAL
MONOCYTES # BLD AUTO: 0.03 K/UL (ref 0–0.85)
MONOCYTES # BLD AUTO: 0.11 K/UL (ref 0–0.85)
MONOCYTES # BLD AUTO: 0.17 K/UL (ref 0–0.85)
MONOCYTES # BLD AUTO: 0.17 K/UL (ref 0–0.85)
MONOCYTES # BLD AUTO: 0.22 K/UL (ref 0–0.85)
MONOCYTES # BLD AUTO: 0.27 K/UL (ref 0–0.85)
MONOCYTES # BLD AUTO: 0.33 K/UL (ref 0–0.85)
MONOCYTES # BLD AUTO: 0.41 K/UL (ref 0–0.85)
MONOCYTES NFR BLD AUTO: 3.4 % (ref 0–13.4)
MONOCYTES NFR BLD AUTO: 3.7 % (ref 0–13.4)
MONOCYTES NFR BLD AUTO: 4.3 % (ref 0–13.4)
MONOCYTES NFR BLD AUTO: 4.3 % (ref 0–13.4)
MONOCYTES NFR BLD AUTO: 4.4 % (ref 0–13.4)
MONOCYTES NFR BLD AUTO: 5.3 % (ref 0–13.4)
MONOCYTES NFR BLD AUTO: 5.5 % (ref 0–13.4)
MONOCYTES NFR BLD AUTO: 6.7 % (ref 0–13.4)
MORPHOLOGY BLD-IMP: NORMAL
MYELOCYTES NFR BLD MANUAL: 0.7 %
MYELOCYTES NFR BLD MANUAL: 1.2 %
MYELOCYTES NFR BLD MANUAL: 2.5 %
NEUTROPHILS # BLD AUTO: 0.52 K/UL (ref 1.82–7.42)
NEUTROPHILS # BLD AUTO: 1.99 K/UL (ref 1.82–7.42)
NEUTROPHILS # BLD AUTO: 3.28 K/UL (ref 1.82–7.42)
NEUTROPHILS # BLD AUTO: 3.67 K/UL (ref 1.82–7.42)
NEUTROPHILS # BLD AUTO: 3.85 K/UL (ref 1.82–7.42)
NEUTROPHILS # BLD AUTO: 3.94 K/UL (ref 1.82–7.42)
NEUTROPHILS # BLD AUTO: 4.44 K/UL (ref 1.82–7.42)
NEUTROPHILS # BLD AUTO: 4.87 K/UL (ref 1.82–7.42)
NEUTROPHILS NFR BLD: 56.6 % (ref 44–72)
NEUTROPHILS NFR BLD: 60.4 % (ref 44–72)
NEUTROPHILS NFR BLD: 72 % (ref 44–72)
NEUTROPHILS NFR BLD: 73 % (ref 44–72)
NEUTROPHILS NFR BLD: 74.6 % (ref 44–72)
NEUTROPHILS NFR BLD: 76 % (ref 44–72)
NEUTROPHILS NFR BLD: 79.6 % (ref 44–72)
NEUTROPHILS NFR BLD: 83.4 % (ref 44–72)
NEUTS BAND NFR BLD MANUAL: 8.1 % (ref 0–10)
NEUTS BAND NFR BLD MANUAL: 8.5 % (ref 0–10)
NEUTS BAND NFR BLD MANUAL: 9.5 % (ref 0–10)
NRBC # BLD AUTO: 0 K/UL
NRBC # BLD AUTO: 0.02 K/UL
NRBC # BLD AUTO: 0.03 K/UL
NRBC BLD-RTO: 0 /100 WBC (ref 0–0.2)
NRBC BLD-RTO: 0.6 /100 WBC (ref 0–0.2)
NRBC BLD-RTO: 2.4 /100 WBC (ref 0–0.2)
O2/TOTAL GAS SETTING VFR VENT: 30 %
O2/TOTAL GAS SETTING VFR VENT: 50 %
O2/TOTAL GAS SETTING VFR VENT: 50 %
PA AA BLD-ACNC: 32.7 % (ref 0–11)
PA AA BLD-ACNC: 41.2 % (ref 0–11)
PA AA BLD-ACNC: 56 % (ref 0–11)
PA ADP BLD-ACNC: 16.9 % (ref 0–17)
PA ADP BLD-ACNC: 30.8 % (ref 0–17)
PA ADP BLD-ACNC: 9.8 % (ref 0–17)
PCO2 BLDA: 27.1 MMHG (ref 32–48)
PCO2 BLDA: 44.3 MMHG (ref 32–48)
PCO2 BLDA: 45.9 MMHG (ref 32–48)
PCO2 TEMP ADJ BLDA: 28 MMHG (ref 32–48)
PCO2 TEMP ADJ BLDA: 43.2 MMHG (ref 32–48)
PCO2 TEMP ADJ BLDA: 44.4 MMHG (ref 32–48)
PEEP END EXPIRATORY PRESSURE IPEEP: 10 CMH20
PEEP END EXPIRATORY PRESSURE IPEEP: 8 CMH20
PEEP END EXPIRATORY PRESSURE IPEEP: 8 CMH20
PERCENT MINUTE VOLUME IPMV: 100
PH BLDA: 7.29 [PH] (ref 7.35–7.45)
PH BLDA: 7.34 [PH] (ref 7.35–7.45)
PH BLDA: 7.5 [PH] (ref 7.35–7.45)
PH TEMP ADJ BLDA: 7.3 [PH] (ref 7.35–7.45)
PH TEMP ADJ BLDA: 7.35 [PH] (ref 7.35–7.45)
PH TEMP ADJ BLDA: 7.48 [PH] (ref 7.35–7.45)
PHOSPHATE SERPL-MCNC: 3.5 MG/DL (ref 2.5–4.5)
PHOSPHATE SERPL-MCNC: 4.3 MG/DL (ref 2.5–4.5)
PHOSPHATE SERPL-MCNC: 5.1 MG/DL (ref 2.5–4.5)
PLATELET # BLD AUTO: 112 K/UL (ref 164–446)
PLATELET # BLD AUTO: 132 K/UL (ref 164–446)
PLATELET # BLD AUTO: 136 K/UL (ref 164–446)
PLATELET # BLD AUTO: 146 K/UL (ref 164–446)
PLATELET # BLD AUTO: 149 K/UL (ref 164–446)
PLATELET # BLD AUTO: 150 K/UL (ref 164–446)
PLATELET # BLD AUTO: 17 K/UL (ref 164–446)
PLATELET # BLD AUTO: 178 K/UL (ref 164–446)
PLATELET # BLD AUTO: 25 K/UL (ref 164–446)
PLATELET # BLD AUTO: 51 K/UL (ref 164–446)
PLATELET # BLD AUTO: 81 K/UL (ref 164–446)
PLATELET BLD QL SMEAR: NORMAL
PLATELETS.RETICULATED NFR BLD AUTO: 15.7 % (ref 0.6–13.1)
PLATELETS.RETICULATED NFR BLD AUTO: 6.7 % (ref 0.6–13.1)
PLATELETS.RETICULATED NFR BLD AUTO: 6.8 % (ref 0.6–13.1)
PLATELETS.RETICULATED NFR BLD AUTO: 9.5 % (ref 0.6–13.1)
PMV BLD AUTO: 10 FL (ref 9–12.9)
PMV BLD AUTO: 10.1 FL (ref 9–12.9)
PMV BLD AUTO: 10.6 FL (ref 9–12.9)
PMV BLD AUTO: 10.6 FL (ref 9–12.9)
PMV BLD AUTO: 11.5 FL (ref 9–12.9)
PMV BLD AUTO: 9 FL (ref 9–12.9)
PMV BLD AUTO: 9.6 FL (ref 9–12.9)
PMV BLD AUTO: 9.6 FL (ref 9–12.9)
PMV BLD AUTO: 9.8 FL (ref 9–12.9)
PO2 BLDA: 51 MMHG (ref 83–108)
PO2 BLDA: 60 MMHG (ref 83–108)
PO2 BLDA: 61 MMHG (ref 83–108)
PO2 TEMP ADJ BLDA: 54 MMHG (ref 83–108)
PO2 TEMP ADJ BLDA: 57 MMHG (ref 64–87)
PO2 TEMP ADJ BLDA: 58 MMHG (ref 64–87)
POIKILOCYTOSIS BLD QL SMEAR: NORMAL
POTASSIUM SERPL-SCNC: 3.4 MMOL/L (ref 3.6–5.5)
POTASSIUM SERPL-SCNC: 3.6 MMOL/L (ref 3.6–5.5)
POTASSIUM SERPL-SCNC: 3.7 MMOL/L (ref 3.6–5.5)
POTASSIUM SERPL-SCNC: 3.8 MMOL/L (ref 3.6–5.5)
POTASSIUM SERPL-SCNC: 4 MMOL/L (ref 3.6–5.5)
POTASSIUM SERPL-SCNC: 4.1 MMOL/L (ref 3.6–5.5)
POTASSIUM SERPL-SCNC: 4.2 MMOL/L (ref 3.6–5.5)
POTASSIUM SERPL-SCNC: 4.3 MMOL/L (ref 3.6–5.5)
PREALB SERPL-MCNC: 8 MG/DL (ref 18–38)
PROT SERPL-MCNC: 5.6 G/DL (ref 6–8.2)
PROT SERPL-MCNC: 7.4 G/DL (ref 6–8.2)
PROTHROMBIN TIME: 14.6 SEC (ref 12–14.6)
PROTHROMBIN TIME: 15.6 SEC (ref 12–14.6)
PROTHROMBIN TIME: 20.7 SEC (ref 12–14.6)
RBC # BLD AUTO: 2.96 M/UL (ref 4.7–6.1)
RBC # BLD AUTO: 3.05 M/UL (ref 4.7–6.1)
RBC # BLD AUTO: 3.19 M/UL (ref 4.7–6.1)
RBC # BLD AUTO: 3.2 M/UL (ref 4.7–6.1)
RBC # BLD AUTO: 3.22 M/UL (ref 4.7–6.1)
RBC # BLD AUTO: 3.24 M/UL (ref 4.7–6.1)
RBC # BLD AUTO: 3.32 M/UL (ref 4.7–6.1)
RBC # BLD AUTO: 3.4 M/UL (ref 4.7–6.1)
RBC # BLD AUTO: 3.47 M/UL (ref 4.7–6.1)
RBC # BLD AUTO: 3.63 M/UL (ref 4.7–6.1)
RBC # BLD AUTO: 3.75 M/UL (ref 4.7–6.1)
RBC BLD AUTO: PRESENT
RETICS # AUTO: 0.07 M/UL (ref 0.04–0.12)
RETICS/RBC NFR: 2.3 % (ref 0.8–2.6)
RH BLD: NORMAL
RH BLD: NORMAL
SAO2 % BLDA: 88 % (ref 93–99)
SAO2 % BLDA: 89 % (ref 93–99)
SAO2 % BLDA: 89 % (ref 93–99)
SCCMEC + MECA PNL NOSE NAA+PROBE: NEGATIVE
SIGNIFICANT IND 70042: ABNORMAL
SIGNIFICANT IND 70042: NORMAL
SITE SITE: ABNORMAL
SITE SITE: NORMAL
SODIUM SERPL-SCNC: 130 MMOL/L (ref 135–145)
SODIUM SERPL-SCNC: 130 MMOL/L (ref 135–145)
SODIUM SERPL-SCNC: 132 MMOL/L (ref 135–145)
SODIUM SERPL-SCNC: 135 MMOL/L (ref 135–145)
SODIUM SERPL-SCNC: 136 MMOL/L (ref 135–145)
SODIUM SERPL-SCNC: 137 MMOL/L (ref 135–145)
SODIUM SERPL-SCNC: 138 MMOL/L (ref 135–145)
SODIUM SERPL-SCNC: 139 MMOL/L (ref 135–145)
SOURCE SOURCE: ABNORMAL
SOURCE SOURCE: NORMAL
SPECIMEN DRAWN FROM PATIENT: ABNORMAL
TARGETS BLD QL SMEAR: NORMAL
TARGETS BLD QL SMEAR: NORMAL
TEG ALGORITHM TGALG: ABNORMAL
TIBC SERPL-MCNC: 134 UG/DL (ref 250–450)
TIDAL VOLUME IVT: 410 ML
TIDAL VOLUME IVT: 410 ML
TOXIC GRANULES BLD QL SMEAR: NORMAL
TOXIC GRANULES BLD QL SMEAR: NORMAL
UIBC SERPL-MCNC: 100 UG/DL (ref 110–370)
WBC # BLD AUTO: 0.8 K/UL (ref 4.8–10.8)
WBC # BLD AUTO: 0.8 K/UL (ref 4.8–10.8)
WBC # BLD AUTO: 2.5 K/UL (ref 4.8–10.8)
WBC # BLD AUTO: 3.9 K/UL (ref 4.8–10.8)
WBC # BLD AUTO: 4.2 K/UL (ref 4.8–10.8)
WBC # BLD AUTO: 4.4 K/UL (ref 4.8–10.8)
WBC # BLD AUTO: 4.9 K/UL (ref 4.8–10.8)
WBC # BLD AUTO: 5.1 K/UL (ref 4.8–10.8)
WBC # BLD AUTO: 5.9 K/UL (ref 4.8–10.8)
WBC # BLD AUTO: 6 K/UL (ref 4.8–10.8)
WBC # BLD AUTO: 6.1 K/UL (ref 4.8–10.8)
WBC TOXIC VACUOLES BLD QL SMEAR: NORMAL
WBC TOXIC VACUOLES BLD QL SMEAR: NORMAL

## 2025-01-01 PROCEDURE — 85610 PROTHROMBIN TIME: CPT

## 2025-01-01 PROCEDURE — 83605 ASSAY OF LACTIC ACID: CPT

## 2025-01-01 PROCEDURE — C1729 CATH, DRAINAGE: HCPCS

## 2025-01-01 PROCEDURE — 700101 HCHG RX REV CODE 250

## 2025-01-01 PROCEDURE — 82962 GLUCOSE BLOOD TEST: CPT | Mod: 91

## 2025-01-01 PROCEDURE — 700102 HCHG RX REV CODE 250 W/ 637 OVERRIDE(OP): Performed by: NURSE PRACTITIONER

## 2025-01-01 PROCEDURE — 770022 HCHG ROOM/CARE - ICU (200)

## 2025-01-01 PROCEDURE — 700102 HCHG RX REV CODE 250 W/ 637 OVERRIDE(OP): Performed by: SURGERY

## 2025-01-01 PROCEDURE — 83550 IRON BINDING TEST: CPT

## 2025-01-01 PROCEDURE — 85007 BL SMEAR W/DIFF WBC COUNT: CPT

## 2025-01-01 PROCEDURE — 700111 HCHG RX REV CODE 636 W/ 250 OVERRIDE (IP): Mod: JZ | Performed by: SURGERY

## 2025-01-01 PROCEDURE — C1752 CATH,HEMODIALYSIS,SHORT-TERM: HCPCS

## 2025-01-01 PROCEDURE — 85576 BLOOD PLATELET AGGREGATION: CPT | Mod: 91

## 2025-01-01 PROCEDURE — 36600 WITHDRAWAL OF ARTERIAL BLOOD: CPT

## 2025-01-01 PROCEDURE — 85730 THROMBOPLASTIN TIME PARTIAL: CPT

## 2025-01-01 PROCEDURE — 85347 COAGULATION TIME ACTIVATED: CPT

## 2025-01-01 PROCEDURE — 700101 HCHG RX REV CODE 250: Performed by: SURGERY

## 2025-01-01 PROCEDURE — 32551 INSERTION OF CHEST TUBE: CPT

## 2025-01-01 PROCEDURE — 80048 BASIC METABOLIC PNL TOTAL CA: CPT

## 2025-01-01 PROCEDURE — 99233 SBSQ HOSP IP/OBS HIGH 50: CPT | Performed by: SURGERY

## 2025-01-01 PROCEDURE — 94669 MECHANICAL CHEST WALL OSCILL: CPT

## 2025-01-01 PROCEDURE — A9270 NON-COVERED ITEM OR SERVICE: HCPCS | Performed by: INTERNAL MEDICINE

## 2025-01-01 PROCEDURE — 74018 RADEX ABDOMEN 1 VIEW: CPT

## 2025-01-01 PROCEDURE — P9047 ALBUMIN (HUMAN), 25%, 50ML: HCPCS | Mod: JZ | Performed by: SURGERY

## 2025-01-01 PROCEDURE — 37799 UNLISTED PX VASCULAR SURGERY: CPT

## 2025-01-01 PROCEDURE — 82533 TOTAL CORTISOL: CPT

## 2025-01-01 PROCEDURE — 90935 HEMODIALYSIS ONE EVALUATION: CPT | Performed by: INTERNAL MEDICINE

## 2025-01-01 PROCEDURE — 86850 RBC ANTIBODY SCREEN: CPT

## 2025-01-01 PROCEDURE — 99152 MOD SED SAME PHYS/QHP 5/>YRS: CPT

## 2025-01-01 PROCEDURE — 85046 RETICYTE/HGB CONCENTRATE: CPT

## 2025-01-01 PROCEDURE — 700105 HCHG RX REV CODE 258: Performed by: NURSE PRACTITIONER

## 2025-01-01 PROCEDURE — 94799 UNLISTED PULMONARY SVC/PX: CPT

## 2025-01-01 PROCEDURE — 700105 HCHG RX REV CODE 258: Performed by: SURGERY

## 2025-01-01 PROCEDURE — A9270 NON-COVERED ITEM OR SERVICE: HCPCS | Performed by: EMERGENCY MEDICINE

## 2025-01-01 PROCEDURE — 700111 HCHG RX REV CODE 636 W/ 250 OVERRIDE (IP): Mod: JZ | Performed by: INTERNAL MEDICINE

## 2025-01-01 PROCEDURE — 99291 CRITICAL CARE FIRST HOUR: CPT | Performed by: SURGERY

## 2025-01-01 PROCEDURE — 05H933Z INSERTION OF INFUSION DEVICE INTO RIGHT BRACHIAL VEIN, PERCUTANEOUS APPROACH: ICD-10-PCS

## 2025-01-01 PROCEDURE — 87205 SMEAR GRAM STAIN: CPT

## 2025-01-01 PROCEDURE — 76942 ECHO GUIDE FOR BIOPSY: CPT | Mod: RT

## 2025-01-01 PROCEDURE — 85384 FIBRINOGEN ACTIVITY: CPT | Mod: 91

## 2025-01-01 PROCEDURE — 770001 HCHG ROOM/CARE - MED/SURG/GYN PRIV*

## 2025-01-01 PROCEDURE — 700102 HCHG RX REV CODE 250 W/ 637 OVERRIDE(OP): Performed by: INTERNAL MEDICINE

## 2025-01-01 PROCEDURE — 82947 ASSAY GLUCOSE BLOOD QUANT: CPT

## 2025-01-01 PROCEDURE — 86900 BLOOD TYPING SEROLOGIC ABO: CPT

## 2025-01-01 PROCEDURE — 700101 HCHG RX REV CODE 250: Performed by: EMERGENCY MEDICINE

## 2025-01-01 PROCEDURE — 85025 COMPLETE CBC W/AUTO DIFF WBC: CPT

## 2025-01-01 PROCEDURE — 87077 CULTURE AEROBIC IDENTIFY: CPT

## 2025-01-01 PROCEDURE — 90935 HEMODIALYSIS ONE EVALUATION: CPT

## 2025-01-01 PROCEDURE — 99222 1ST HOSP IP/OBS MODERATE 55: CPT | Performed by: INTERNAL MEDICINE

## 2025-01-01 PROCEDURE — 94003 VENT MGMT INPAT SUBQ DAY: CPT

## 2025-01-01 PROCEDURE — 85027 COMPLETE CBC AUTOMATED: CPT

## 2025-01-01 PROCEDURE — 31500 INSERT EMERGENCY AIRWAY: CPT

## 2025-01-01 PROCEDURE — 85055 RETICULATED PLATELET ASSAY: CPT

## 2025-01-01 PROCEDURE — 0W9930Z DRAINAGE OF RIGHT PLEURAL CAVITY WITH DRAINAGE DEVICE, PERCUTANEOUS APPROACH: ICD-10-PCS | Performed by: STUDENT IN AN ORGANIZED HEALTH CARE EDUCATION/TRAINING PROGRAM

## 2025-01-01 PROCEDURE — 700111 HCHG RX REV CODE 636 W/ 250 OVERRIDE (IP): Mod: JZ,TB | Performed by: NURSE PRACTITIONER

## 2025-01-01 PROCEDURE — 71045 X-RAY EXAM CHEST 1 VIEW: CPT

## 2025-01-01 PROCEDURE — 82803 BLOOD GASES ANY COMBINATION: CPT | Mod: 91

## 2025-01-01 PROCEDURE — 85014 HEMATOCRIT: CPT

## 2025-01-01 PROCEDURE — 86901 BLOOD TYPING SEROLOGIC RH(D): CPT

## 2025-01-01 PROCEDURE — 80053 COMPREHEN METABOLIC PANEL: CPT

## 2025-01-01 PROCEDURE — 0W993ZZ DRAINAGE OF RIGHT PLEURAL CAVITY, PERCUTANEOUS APPROACH: ICD-10-PCS | Performed by: NURSE PRACTITIONER

## 2025-01-01 PROCEDURE — 99222 1ST HOSP IP/OBS MODERATE 55: CPT | Mod: AI | Performed by: SURGERY

## 2025-01-01 PROCEDURE — 31500 INSERT EMERGENCY AIRWAY: CPT | Performed by: SURGERY

## 2025-01-01 PROCEDURE — 94660 CPAP INITIATION&MGMT: CPT

## 2025-01-01 PROCEDURE — A9270 NON-COVERED ITEM OR SERVICE: HCPCS | Performed by: NURSE PRACTITIONER

## 2025-01-01 PROCEDURE — 700111 HCHG RX REV CODE 636 W/ 250 OVERRIDE (IP): Mod: JZ,TB | Performed by: PHYSICIAN ASSISTANT

## 2025-01-01 PROCEDURE — A9270 NON-COVERED ITEM OR SERVICE: HCPCS | Performed by: SURGERY

## 2025-01-01 PROCEDURE — 93010 ELECTROCARDIOGRAM REPORT: CPT | Performed by: INTERNAL MEDICINE

## 2025-01-01 PROCEDURE — 700111 HCHG RX REV CODE 636 W/ 250 OVERRIDE (IP): Mod: JZ

## 2025-01-01 PROCEDURE — 96374 THER/PROPH/DIAG INJ IV PUSH: CPT

## 2025-01-01 PROCEDURE — 700111 HCHG RX REV CODE 636 W/ 250 OVERRIDE (IP)

## 2025-01-01 PROCEDURE — 94150 VITAL CAPACITY TEST: CPT

## 2025-01-01 PROCEDURE — 700111 HCHG RX REV CODE 636 W/ 250 OVERRIDE (IP): Mod: JZ | Performed by: STUDENT IN AN ORGANIZED HEALTH CARE EDUCATION/TRAINING PROGRAM

## 2025-01-01 PROCEDURE — 85384 FIBRINOGEN ACTIVITY: CPT

## 2025-01-01 PROCEDURE — 700102 HCHG RX REV CODE 250 W/ 637 OVERRIDE(OP): Performed by: EMERGENCY MEDICINE

## 2025-01-01 PROCEDURE — 87040 BLOOD CULTURE FOR BACTERIA: CPT

## 2025-01-01 PROCEDURE — 82962 GLUCOSE BLOOD TEST: CPT

## 2025-01-01 PROCEDURE — 99999 PR NO CHARGE: CPT | Performed by: NURSE PRACTITIONER

## 2025-01-01 PROCEDURE — 36556 INSERT NON-TUNNEL CV CATH: CPT

## 2025-01-01 PROCEDURE — 82803 BLOOD GASES ANY COMBINATION: CPT

## 2025-01-01 PROCEDURE — 87186 SC STD MICRODIL/AGAR DIL: CPT

## 2025-01-01 PROCEDURE — 99292 CRITICAL CARE ADDL 30 MIN: CPT | Performed by: SURGERY

## 2025-01-01 PROCEDURE — 03HY32Z INSERTION OF MONITORING DEVICE INTO UPPER ARTERY, PERCUTANEOUS APPROACH: ICD-10-PCS | Performed by: STUDENT IN AN ORGANIZED HEALTH CARE EDUCATION/TRAINING PROGRAM

## 2025-01-01 PROCEDURE — 84134 ASSAY OF PREALBUMIN: CPT

## 2025-01-01 PROCEDURE — 36415 COLL VENOUS BLD VENIPUNCTURE: CPT

## 2025-01-01 PROCEDURE — 94002 VENT MGMT INPAT INIT DAY: CPT

## 2025-01-01 PROCEDURE — 71101 X-RAY EXAM UNILAT RIBS/CHEST: CPT | Mod: RT

## 2025-01-01 PROCEDURE — 99232 SBSQ HOSP IP/OBS MODERATE 35: CPT | Performed by: INTERNAL MEDICINE

## 2025-01-01 PROCEDURE — 72170 X-RAY EXAM OF PELVIS: CPT

## 2025-01-01 PROCEDURE — 71250 CT THORAX DX C-: CPT

## 2025-01-01 PROCEDURE — 36556 INSERT NON-TUNNEL CV CATH: CPT | Performed by: SURGERY

## 2025-01-01 PROCEDURE — 83735 ASSAY OF MAGNESIUM: CPT

## 2025-01-01 PROCEDURE — 87641 MR-STAPH DNA AMP PROBE: CPT

## 2025-01-01 PROCEDURE — 5A1D70Z PERFORMANCE OF URINARY FILTRATION, INTERMITTENT, LESS THAN 6 HOURS PER DAY: ICD-10-PCS | Performed by: INTERNAL MEDICINE

## 2025-01-01 PROCEDURE — 87070 CULTURE OTHR SPECIMN AEROBIC: CPT

## 2025-01-01 PROCEDURE — 86140 C-REACTIVE PROTEIN: CPT

## 2025-01-01 PROCEDURE — 84100 ASSAY OF PHOSPHORUS: CPT

## 2025-01-01 PROCEDURE — 0B9M8ZX DRAINAGE OF BILATERAL LUNGS, VIA NATURAL OR ARTIFICIAL OPENING ENDOSCOPIC, DIAGNOSTIC: ICD-10-PCS | Performed by: SURGERY

## 2025-01-01 PROCEDURE — C1729 CATH, DRAINAGE: HCPCS | Performed by: SURGERY

## 2025-01-01 PROCEDURE — 0W9900Z DRAINAGE OF RIGHT PLEURAL CAVITY WITH DRAINAGE DEVICE, OPEN APPROACH: ICD-10-PCS | Performed by: NURSE PRACTITIONER

## 2025-01-01 PROCEDURE — 99285 EMERGENCY DEPT VISIT HI MDM: CPT

## 2025-01-01 PROCEDURE — 700111 HCHG RX REV CODE 636 W/ 250 OVERRIDE (IP): Performed by: SURGERY

## 2025-01-01 PROCEDURE — 94640 AIRWAY INHALATION TREATMENT: CPT

## 2025-01-01 PROCEDURE — 36620 INSERTION CATHETER ARTERY: CPT

## 2025-01-01 PROCEDURE — 99233 SBSQ HOSP IP/OBS HIGH 50: CPT | Performed by: INTERNAL MEDICINE

## 2025-01-01 PROCEDURE — 93005 ELECTROCARDIOGRAM TRACING: CPT | Mod: TC | Performed by: STUDENT IN AN ORGANIZED HEALTH CARE EDUCATION/TRAINING PROGRAM

## 2025-01-01 PROCEDURE — C1751 CATH, INF, PER/CENT/MIDLINE: HCPCS

## 2025-01-01 PROCEDURE — 5A1945Z RESPIRATORY VENTILATION, 24-96 CONSECUTIVE HOURS: ICD-10-PCS | Performed by: SURGERY

## 2025-01-01 PROCEDURE — 302978 HCHG BRONCHOSCOPY-DIAGNOSTIC

## 2025-01-01 PROCEDURE — 36620 INSERTION CATHETER ARTERY: CPT | Mod: RT | Performed by: STUDENT IN AN ORGANIZED HEALTH CARE EDUCATION/TRAINING PROGRAM

## 2025-01-01 PROCEDURE — 0BH17EZ INSERTION OF ENDOTRACHEAL AIRWAY INTO TRACHEA, VIA NATURAL OR ARTIFICIAL OPENING: ICD-10-PCS | Performed by: SURGERY

## 2025-01-01 PROCEDURE — 302136 NUTRITION PUMP: Performed by: SURGERY

## 2025-01-01 PROCEDURE — 700111 HCHG RX REV CODE 636 W/ 250 OVERRIDE (IP): Performed by: INTERNAL MEDICINE

## 2025-01-01 PROCEDURE — 83540 ASSAY OF IRON: CPT

## 2025-01-01 PROCEDURE — 700105 HCHG RX REV CODE 258

## 2025-01-01 PROCEDURE — 85018 HEMOGLOBIN: CPT

## 2025-01-01 PROCEDURE — 99233 SBSQ HOSP IP/OBS HIGH 50: CPT | Performed by: NURSE PRACTITIONER

## 2025-01-01 PROCEDURE — 700111 HCHG RX REV CODE 636 W/ 250 OVERRIDE (IP): Mod: JZ | Performed by: PHYSICIAN ASSISTANT

## 2025-01-01 RX ORDER — ROCURONIUM BROMIDE 10 MG/ML
100 INJECTION, SOLUTION INTRAVENOUS ONCE
Status: DISCONTINUED | OUTPATIENT
Start: 2025-01-01 | End: 2025-01-01

## 2025-01-01 RX ORDER — NIFEDIPINE 30 MG/1
90 TABLET, EXTENDED RELEASE ORAL EVERY EVENING
Status: DISCONTINUED | OUTPATIENT
Start: 2025-01-01 | End: 2025-01-01

## 2025-01-01 RX ORDER — METHYLPREDNISOLONE SODIUM SUCCINATE 125 MG/2ML
100 INJECTION, POWDER, LYOPHILIZED, FOR SOLUTION INTRAMUSCULAR; INTRAVENOUS ONCE
Status: COMPLETED | OUTPATIENT
Start: 2025-01-01 | End: 2025-01-01

## 2025-01-01 RX ORDER — SODIUM CHLORIDE, SODIUM LACTATE, POTASSIUM CHLORIDE, AND CALCIUM CHLORIDE .6; .31; .03; .02 G/100ML; G/100ML; G/100ML; G/100ML
250 INJECTION, SOLUTION INTRAVENOUS ONCE
Status: COMPLETED | OUTPATIENT
Start: 2025-01-01 | End: 2025-01-01

## 2025-01-01 RX ORDER — CEFAZOLIN SODIUM 1 G/50ML
1 INJECTION, SOLUTION INTRAVENOUS ONCE
Status: COMPLETED | OUTPATIENT
Start: 2025-01-01 | End: 2025-01-01

## 2025-01-01 RX ORDER — NIFEDIPINE 30 MG/1
90 TABLET, EXTENDED RELEASE ORAL ONCE
Status: COMPLETED | OUTPATIENT
Start: 2025-01-01 | End: 2025-01-01

## 2025-01-01 RX ORDER — HEPARIN SODIUM 1000 [USP'U]/ML
1400 INJECTION, SOLUTION INTRAVENOUS; SUBCUTANEOUS
Status: DISCONTINUED | OUTPATIENT
Start: 2025-01-01 | End: 2025-01-01

## 2025-01-01 RX ORDER — LOPERAMIDE HYDROCHLORIDE 2 MG/1
2 CAPSULE ORAL EVERY 8 HOURS
Status: DISCONTINUED | OUTPATIENT
Start: 2025-01-01 | End: 2025-01-01

## 2025-01-01 RX ORDER — HEPARIN SODIUM 5000 [USP'U]/ML
5000 INJECTION, SOLUTION INTRAVENOUS; SUBCUTANEOUS EVERY 8 HOURS
Status: DISCONTINUED | OUTPATIENT
Start: 2025-01-01 | End: 2025-01-01

## 2025-01-01 RX ORDER — OXYCODONE HYDROCHLORIDE 5 MG/1
5 TABLET ORAL
Status: DISCONTINUED | OUTPATIENT
Start: 2025-01-01 | End: 2025-01-01

## 2025-01-01 RX ORDER — CALCIUM CHLORIDE 100 MG/ML
INJECTION INTRAVENOUS; INTRAVENTRICULAR
Status: COMPLETED | OUTPATIENT
Start: 2025-01-01 | End: 2025-01-01

## 2025-01-01 RX ORDER — SODIUM PHOSPHATE,MONO-DIBASIC 19G-7G/118
1 ENEMA (ML) RECTAL
Status: DISCONTINUED | OUTPATIENT
Start: 2025-01-01 | End: 2025-01-01

## 2025-01-01 RX ORDER — HYDRALAZINE HYDROCHLORIDE 25 MG/1
25 TABLET, FILM COATED ORAL ONCE
Status: COMPLETED | OUTPATIENT
Start: 2025-01-01 | End: 2025-01-01

## 2025-01-01 RX ORDER — AMLODIPINE BESYLATE 5 MG/1
10 TABLET ORAL ONCE
Status: COMPLETED | OUTPATIENT
Start: 2025-01-01 | End: 2025-01-01

## 2025-01-01 RX ORDER — DEXTROSE MONOHYDRATE 25 G/50ML
25 INJECTION, SOLUTION INTRAVENOUS
Status: DISCONTINUED | OUTPATIENT
Start: 2025-01-01 | End: 2025-01-01

## 2025-01-01 RX ORDER — OXYCODONE HYDROCHLORIDE 10 MG/1
10 TABLET ORAL
Status: DISCONTINUED | OUTPATIENT
Start: 2025-01-01 | End: 2025-01-01

## 2025-01-01 RX ORDER — LORAZEPAM 2 MG/ML
2 INJECTION INTRAMUSCULAR ONCE
Status: COMPLETED | OUTPATIENT
Start: 2025-01-01 | End: 2025-01-01

## 2025-01-01 RX ORDER — NOREPINEPHRINE BITARTRATE 0.03 MG/ML
0-1 INJECTION, SOLUTION INTRAVENOUS CONTINUOUS
Status: DISCONTINUED | OUTPATIENT
Start: 2025-01-01 | End: 2025-01-01

## 2025-01-01 RX ORDER — ROCURONIUM BROMIDE 10 MG/ML
0.6 INJECTION, SOLUTION INTRAVENOUS ONCE
Status: COMPLETED | OUTPATIENT
Start: 2025-01-01 | End: 2025-01-01

## 2025-01-01 RX ORDER — HYDROCORTISONE SODIUM SUCCINATE 100 MG/2ML
100 INJECTION INTRAMUSCULAR; INTRAVENOUS EVERY 8 HOURS
Status: DISCONTINUED | OUTPATIENT
Start: 2025-01-01 | End: 2025-01-01

## 2025-01-01 RX ORDER — LANSOPRAZOLE 30 MG/1
30 TABLET, ORALLY DISINTEGRATING, DELAYED RELEASE ORAL DAILY
Status: DISCONTINUED | OUTPATIENT
Start: 2025-01-01 | End: 2025-01-01

## 2025-01-01 RX ORDER — LIDOCAINE 4 G/G
1 PATCH TOPICAL ONCE
Status: COMPLETED | OUTPATIENT
Start: 2025-01-01 | End: 2025-01-01

## 2025-01-01 RX ORDER — OXYCODONE HYDROCHLORIDE 5 MG/1
5 TABLET ORAL ONCE
Status: COMPLETED | OUTPATIENT
Start: 2025-01-01 | End: 2025-01-01

## 2025-01-01 RX ORDER — AMLODIPINE BESYLATE 10 MG/1
10 TABLET ORAL
Status: DISCONTINUED | OUTPATIENT
Start: 2025-01-01 | End: 2025-01-01

## 2025-01-01 RX ORDER — POLYETHYLENE GLYCOL 3350 17 G/17G
1 POWDER, FOR SOLUTION ORAL 2 TIMES DAILY
Status: DISCONTINUED | OUTPATIENT
Start: 2025-01-01 | End: 2025-01-01

## 2025-01-01 RX ORDER — HYDROMORPHONE HYDROCHLORIDE 1 MG/ML
0.5 INJECTION, SOLUTION INTRAMUSCULAR; INTRAVENOUS; SUBCUTANEOUS
Status: DISCONTINUED | OUTPATIENT
Start: 2025-01-01 | End: 2025-01-01

## 2025-01-01 RX ORDER — SODIUM CHLORIDE, SODIUM LACTATE, POTASSIUM CHLORIDE, AND CALCIUM CHLORIDE .6; .31; .03; .02 G/100ML; G/100ML; G/100ML; G/100ML
500 INJECTION, SOLUTION INTRAVENOUS ONCE
Status: COMPLETED | OUTPATIENT
Start: 2025-01-01 | End: 2025-01-01

## 2025-01-01 RX ORDER — SODIUM CHLORIDE 9 MG/ML
500 INJECTION, SOLUTION INTRAVENOUS ONCE
Status: COMPLETED | OUTPATIENT
Start: 2025-01-01 | End: 2025-01-01

## 2025-01-01 RX ORDER — CEFAZOLIN SODIUM 1 G/50ML
1 INJECTION, SOLUTION INTRAVENOUS EVERY 24 HOURS
Status: DISCONTINUED | OUTPATIENT
Start: 2025-01-01 | End: 2025-01-01

## 2025-01-01 RX ORDER — ACETAMINOPHEN 325 MG/1
650 TABLET ORAL 4 TIMES DAILY
Status: DISPENSED | OUTPATIENT
Start: 2025-01-01 | End: 2025-01-01

## 2025-01-01 RX ORDER — DOCUSATE SODIUM 100 MG/1
100 CAPSULE, LIQUID FILLED ORAL 2 TIMES DAILY
Status: DISCONTINUED | OUTPATIENT
Start: 2025-01-01 | End: 2025-01-01

## 2025-01-01 RX ORDER — HALOPERIDOL 5 MG/ML
1 INJECTION INTRAMUSCULAR EVERY 4 HOURS PRN
Status: DISCONTINUED | OUTPATIENT
Start: 2025-01-01 | End: 2025-01-24 | Stop reason: HOSPADM

## 2025-01-01 RX ORDER — HYDRALAZINE HYDROCHLORIDE 20 MG/ML
10-20 INJECTION INTRAMUSCULAR; INTRAVENOUS EVERY 4 HOURS PRN
Status: DISCONTINUED | OUTPATIENT
Start: 2025-01-01 | End: 2025-01-01

## 2025-01-01 RX ORDER — ALBUMIN (HUMAN) 12.5 G/50ML
25 SOLUTION INTRAVENOUS ONCE
Status: COMPLETED | OUTPATIENT
Start: 2025-01-01 | End: 2025-01-01

## 2025-01-01 RX ORDER — ACETAMINOPHEN 500 MG
1000 TABLET ORAL ONCE
Status: COMPLETED | OUTPATIENT
Start: 2025-01-01 | End: 2025-01-01

## 2025-01-01 RX ORDER — EPINEPHRINE 0.1 MG/ML
SYRINGE (ML) INJECTION
Status: COMPLETED | OUTPATIENT
Start: 2025-01-01 | End: 2025-01-01

## 2025-01-01 RX ORDER — DEXTROSE MONOHYDRATE 100 MG/ML
INJECTION, SOLUTION INTRAVENOUS CONTINUOUS
Status: DISCONTINUED | OUTPATIENT
Start: 2025-01-01 | End: 2025-01-01

## 2025-01-01 RX ORDER — MIDAZOLAM HYDROCHLORIDE 1 MG/ML
.5-2 INJECTION INTRAMUSCULAR; INTRAVENOUS PRN
Status: ACTIVE | OUTPATIENT
Start: 2025-01-01 | End: 2025-01-01

## 2025-01-01 RX ORDER — MIDAZOLAM HYDROCHLORIDE 1 MG/ML
INJECTION INTRAMUSCULAR; INTRAVENOUS
Status: COMPLETED
Start: 2025-01-01 | End: 2025-01-01

## 2025-01-01 RX ORDER — MAGNESIUM SULFATE HEPTAHYDRATE 40 MG/ML
2 INJECTION, SOLUTION INTRAVENOUS ONCE
Status: COMPLETED | OUTPATIENT
Start: 2025-01-01 | End: 2025-01-01

## 2025-01-01 RX ORDER — AMOXICILLIN 250 MG
1 CAPSULE ORAL
Status: DISCONTINUED | OUTPATIENT
Start: 2025-01-01 | End: 2025-01-01

## 2025-01-01 RX ORDER — BISACODYL 10 MG
10 SUPPOSITORY, RECTAL RECTAL
Status: DISCONTINUED | OUTPATIENT
Start: 2025-01-01 | End: 2025-01-01

## 2025-01-01 RX ORDER — PROCHLORPERAZINE EDISYLATE 5 MG/ML
5 INJECTION INTRAMUSCULAR; INTRAVENOUS ONCE
Status: ACTIVE | OUTPATIENT
Start: 2025-01-01 | End: 2025-01-01

## 2025-01-01 RX ORDER — NOREPINEPHRINE BITARTRATE 0.03 MG/ML
0-1 INJECTION, SOLUTION INTRAVENOUS CONTINUOUS
Status: ACTIVE | OUTPATIENT
Start: 2025-01-01 | End: 2025-01-01

## 2025-01-01 RX ORDER — ATROPINE SULFATE 10 MG/ML
2 SOLUTION/ DROPS OPHTHALMIC EVERY 4 HOURS PRN
Status: DISCONTINUED | OUTPATIENT
Start: 2025-01-01 | End: 2025-01-24 | Stop reason: HOSPADM

## 2025-01-01 RX ORDER — PROPOFOL 10 MG/ML
0-200 INJECTION, EMULSION INTRAVENOUS ONCE
Status: COMPLETED | OUTPATIENT
Start: 2025-01-01 | End: 2025-01-01

## 2025-01-01 RX ORDER — ONDANSETRON 4 MG/1
4 TABLET, ORALLY DISINTEGRATING ORAL EVERY 4 HOURS PRN
Status: DISCONTINUED | OUTPATIENT
Start: 2025-01-01 | End: 2025-01-01

## 2025-01-01 RX ORDER — METHOCARBAMOL 750 MG/1
750 TABLET, FILM COATED ORAL 4 TIMES DAILY
Status: DISCONTINUED | OUTPATIENT
Start: 2025-01-01 | End: 2025-01-01

## 2025-01-01 RX ORDER — LIDOCAINE 4 G/G
1 PATCH TOPICAL EVERY 24 HOURS
Status: DISCONTINUED | OUTPATIENT
Start: 2025-01-01 | End: 2025-01-01

## 2025-01-01 RX ORDER — DEXMEDETOMIDINE HYDROCHLORIDE 4 UG/ML
.1-1.5 INJECTION, SOLUTION INTRAVENOUS CONTINUOUS
Status: DISCONTINUED | OUTPATIENT
Start: 2025-01-01 | End: 2025-01-01

## 2025-01-01 RX ORDER — DEXTROSE 20 G/100ML
INJECTION, SOLUTION INTRAVENOUS CONTINUOUS
Status: DISCONTINUED | OUTPATIENT
Start: 2025-01-01 | End: 2025-01-01

## 2025-01-01 RX ORDER — SEVELAMER CARBONATE 800 MG/1
800 TABLET, FILM COATED ORAL
Status: DISCONTINUED | OUTPATIENT
Start: 2025-01-01 | End: 2025-01-01

## 2025-01-01 RX ORDER — LABETALOL HYDROCHLORIDE 5 MG/ML
10-20 INJECTION, SOLUTION INTRAVENOUS EVERY 4 HOURS PRN
Status: DISCONTINUED | OUTPATIENT
Start: 2025-01-01 | End: 2025-01-01

## 2025-01-01 RX ORDER — PROCHLORPERAZINE EDISYLATE 5 MG/ML
5 INJECTION INTRAMUSCULAR; INTRAVENOUS EVERY 6 HOURS PRN
Status: DISCONTINUED | OUTPATIENT
Start: 2025-01-01 | End: 2025-01-01

## 2025-01-01 RX ORDER — OMEPRAZOLE 20 MG/1
20 CAPSULE, DELAYED RELEASE ORAL DAILY
Status: DISCONTINUED | OUTPATIENT
Start: 2025-01-01 | End: 2025-01-01

## 2025-01-01 RX ORDER — CARVEDILOL 3.12 MG/1
3.12 TABLET ORAL 2 TIMES DAILY WITH MEALS
Status: DISCONTINUED | OUTPATIENT
Start: 2025-01-01 | End: 2025-01-01

## 2025-01-01 RX ORDER — GABAPENTIN 100 MG/1
100 CAPSULE ORAL 2 TIMES DAILY
Status: DISCONTINUED | OUTPATIENT
Start: 2025-01-01 | End: 2025-01-01

## 2025-01-01 RX ORDER — DEXTROSE MONOHYDRATE AND SODIUM CHLORIDE 5; .45 G/100ML; G/100ML
INJECTION, SOLUTION INTRAVENOUS CONTINUOUS
Status: DISCONTINUED | OUTPATIENT
Start: 2025-01-01 | End: 2025-01-01

## 2025-01-01 RX ORDER — SCOPOLAMINE 1 MG/3D
1 PATCH, EXTENDED RELEASE TRANSDERMAL
Status: DISCONTINUED | OUTPATIENT
Start: 2025-01-01 | End: 2025-01-24 | Stop reason: HOSPADM

## 2025-01-01 RX ORDER — SODIUM CHLORIDE 9 MG/ML
500 INJECTION, SOLUTION INTRAVENOUS
Status: ACTIVE | OUTPATIENT
Start: 2025-01-01 | End: 2025-01-01

## 2025-01-01 RX ORDER — LOPERAMIDE HYDROCHLORIDE 2 MG/1
2 CAPSULE ORAL 4 TIMES DAILY PRN
Status: DISCONTINUED | OUTPATIENT
Start: 2025-01-01 | End: 2025-01-01

## 2025-01-01 RX ORDER — MORPHINE SULFATE 4 MG/ML
4 INJECTION INTRAVENOUS
Status: DISCONTINUED | OUTPATIENT
Start: 2025-01-01 | End: 2025-01-01

## 2025-01-01 RX ORDER — AMOXICILLIN 250 MG
1 CAPSULE ORAL NIGHTLY
Status: DISCONTINUED | OUTPATIENT
Start: 2025-01-01 | End: 2025-01-01

## 2025-01-01 RX ORDER — ONDANSETRON 2 MG/ML
4 INJECTION INTRAMUSCULAR; INTRAVENOUS EVERY 4 HOURS PRN
Status: DISCONTINUED | OUTPATIENT
Start: 2025-01-01 | End: 2025-01-24 | Stop reason: HOSPADM

## 2025-01-01 RX ORDER — ONDANSETRON 4 MG/1
4 TABLET, ORALLY DISINTEGRATING ORAL EVERY 4 HOURS PRN
Status: DISCONTINUED | OUTPATIENT
Start: 2025-01-01 | End: 2025-01-24 | Stop reason: HOSPADM

## 2025-01-01 RX ORDER — ONDANSETRON 2 MG/ML
4 INJECTION INTRAMUSCULAR; INTRAVENOUS PRN
Status: ACTIVE | OUTPATIENT
Start: 2025-01-01 | End: 2025-01-01

## 2025-01-01 RX ORDER — DOCUSATE SODIUM 50 MG/5ML
100 LIQUID ORAL 2 TIMES DAILY
Status: DISCONTINUED | OUTPATIENT
Start: 2025-01-01 | End: 2025-01-01

## 2025-01-01 RX ORDER — HYDROCORTISONE SODIUM SUCCINATE 100 MG/2ML
50 INJECTION INTRAMUSCULAR; INTRAVENOUS EVERY 8 HOURS
Status: COMPLETED | OUTPATIENT
Start: 2025-01-01 | End: 2025-01-01

## 2025-01-01 RX ORDER — CARVEDILOL 6.25 MG/1
3.12 TABLET ORAL ONCE
Status: COMPLETED | OUTPATIENT
Start: 2025-01-01 | End: 2025-01-01

## 2025-01-01 RX ORDER — ACETAMINOPHEN 325 MG/1
650 TABLET ORAL 4 TIMES DAILY
Status: DISCONTINUED | OUTPATIENT
Start: 2025-01-01 | End: 2025-01-01

## 2025-01-01 RX ORDER — SODIUM CHLORIDE 9 MG/ML
100 INJECTION, SOLUTION INTRAVENOUS
Status: DISCONTINUED | OUTPATIENT
Start: 2025-01-01 | End: 2025-01-01

## 2025-01-01 RX ADMIN — DEXTROSE MONOHYDRATE 25 G: 25 INJECTION, SOLUTION INTRAVENOUS at 18:52

## 2025-01-01 RX ADMIN — MIDAZOLAM HYDROCHLORIDE 0.5 MG: 1 INJECTION, SOLUTION INTRAMUSCULAR; INTRAVENOUS at 10:09

## 2025-01-01 RX ADMIN — CEFAZOLIN SODIUM 1 G: 1 INJECTION, SOLUTION INTRAVENOUS at 18:41

## 2025-01-01 RX ADMIN — LIDOCAINE 1 PATCH: 4 PATCH TOPICAL at 20:44

## 2025-01-01 RX ADMIN — OXYCODONE 5 MG: 5 TABLET ORAL at 12:19

## 2025-01-01 RX ADMIN — ROCURONIUM BROMIDE 41 MG: 10 INJECTION INTRAVENOUS at 16:29

## 2025-01-01 RX ADMIN — DEXTROSE MONOHYDRATE 25 G: 25 INJECTION, SOLUTION INTRAVENOUS at 23:47

## 2025-01-01 RX ADMIN — LANSOPRAZOLE 30 MG: 30 TABLET, ORALLY DISINTEGRATING, DELAYED RELEASE ORAL at 06:05

## 2025-01-01 RX ADMIN — DEXTROSE MONOHYDRATE 25 G: 25 INJECTION, SOLUTION INTRAVENOUS at 09:09

## 2025-01-01 RX ADMIN — DEXMEDETOMIDINE HYDROCHLORIDE 0.8 MCG/KG/HR: 100 INJECTION, SOLUTION INTRAVENOUS at 20:44

## 2025-01-01 RX ADMIN — DEXTROSE MONOHYDRATE 25 G: 25 INJECTION, SOLUTION INTRAVENOUS at 05:15

## 2025-01-01 RX ADMIN — DEXTROSE AND SODIUM CHLORIDE: 5; 450 INJECTION, SOLUTION INTRAVENOUS at 09:19

## 2025-01-01 RX ADMIN — DEXMEDETOMIDINE HYDROCHLORIDE 0.5 MCG/KG/HR: 100 INJECTION, SOLUTION INTRAVENOUS at 02:27

## 2025-01-01 RX ADMIN — CEFEPIME 1 G: 1 INJECTION, POWDER, FOR SOLUTION INTRAMUSCULAR; INTRAVENOUS at 13:47

## 2025-01-01 RX ADMIN — HYDRALAZINE HYDROCHLORIDE 20 MG: 20 INJECTION, SOLUTION INTRAMUSCULAR; INTRAVENOUS at 09:09

## 2025-01-01 RX ADMIN — HYDROCORTISONE SODIUM SUCCINATE 100 MG: 100 INJECTION, POWDER, FOR SOLUTION INTRAMUSCULAR; INTRAVENOUS at 05:35

## 2025-01-01 RX ADMIN — LOPERAMIDE HYDROCHLORIDE 2 MG: 2 CAPSULE ORAL at 09:36

## 2025-01-01 RX ADMIN — CEFEPIME 1 G: 1 INJECTION, POWDER, FOR SOLUTION INTRAMUSCULAR; INTRAVENOUS at 17:38

## 2025-01-01 RX ADMIN — ONDANSETRON 4 MG: 2 INJECTION INTRAMUSCULAR; INTRAVENOUS at 22:42

## 2025-01-01 RX ADMIN — LOPERAMIDE HYDROCHLORIDE 2 MG: 2 CAPSULE ORAL at 15:10

## 2025-01-01 RX ADMIN — SEVELAMER CARBONATE 800 MG: 800 TABLET, FILM COATED ORAL at 09:38

## 2025-01-01 RX ADMIN — HEPARIN SODIUM 1400 UNITS: 1000 INJECTION, SOLUTION INTRAVENOUS; SUBCUTANEOUS at 13:54

## 2025-01-01 RX ADMIN — LIDOCAINE 1 PATCH: 4 PATCH TOPICAL at 20:08

## 2025-01-01 RX ADMIN — DEXTROSE MONOHYDRATE: 100 INJECTION, SOLUTION INTRAVENOUS at 18:28

## 2025-01-01 RX ADMIN — SODIUM CHLORIDE 250 MG: 9 INJECTION, SOLUTION INTRAVENOUS at 05:04

## 2025-01-01 RX ADMIN — LORAZEPAM 2 MG: 2 INJECTION, SOLUTION INTRAMUSCULAR; INTRAVENOUS at 19:30

## 2025-01-01 RX ADMIN — CEFAZOLIN SODIUM 1 G: 1 INJECTION, SOLUTION INTRAVENOUS at 15:03

## 2025-01-01 RX ADMIN — VANCOMYCIN HYDROCHLORIDE 1250 MG: 5 INJECTION, POWDER, LYOPHILIZED, FOR SOLUTION INTRAVENOUS at 14:19

## 2025-01-01 RX ADMIN — OXYCODONE 5 MG: 5 TABLET ORAL at 14:12

## 2025-01-01 RX ADMIN — SODIUM CHLORIDE 500 ML: 9 INJECTION, SOLUTION INTRAVENOUS at 17:41

## 2025-01-01 RX ADMIN — LOPERAMIDE HYDROCHLORIDE 2 MG: 2 CAPSULE ORAL at 05:55

## 2025-01-01 RX ADMIN — LIDOCAINE 1 PATCH: 4 PATCH TOPICAL at 21:49

## 2025-01-01 RX ADMIN — DEXTROSE MONOHYDRATE: 100 INJECTION, SOLUTION INTRAVENOUS at 18:37

## 2025-01-01 RX ADMIN — DESMOPRESSIN ACETATE 16 MCG: 4 INJECTION INTRAVENOUS; SUBCUTANEOUS at 16:28

## 2025-01-01 RX ADMIN — ACETAMINOPHEN 650 MG: 325 TABLET ORAL at 12:49

## 2025-01-01 RX ADMIN — ONDANSETRON 4 MG: 2 INJECTION INTRAMUSCULAR; INTRAVENOUS at 16:22

## 2025-01-01 RX ADMIN — ONDANSETRON 4 MG: 2 INJECTION INTRAMUSCULAR; INTRAVENOUS at 09:42

## 2025-01-01 RX ADMIN — DEXTROSE MONOHYDRATE: 100 INJECTION, SOLUTION INTRAVENOUS at 16:23

## 2025-01-01 RX ADMIN — GABAPENTIN 100 MG: 100 CAPSULE ORAL at 05:00

## 2025-01-01 RX ADMIN — METHYLPREDNISOLONE SODIUM SUCCINATE 100 MG: 125 INJECTION, POWDER, FOR SOLUTION INTRAMUSCULAR; INTRAVENOUS at 00:20

## 2025-01-01 RX ADMIN — HALOPERIDOL LACTATE 1 MG: 5 INJECTION, SOLUTION INTRAMUSCULAR at 00:43

## 2025-01-01 RX ADMIN — DEXMEDETOMIDINE HYDROCHLORIDE 0.2 MCG/KG/HR: 100 INJECTION, SOLUTION INTRAVENOUS at 07:48

## 2025-01-01 RX ADMIN — ONDANSETRON 4 MG: 2 INJECTION INTRAMUSCULAR; INTRAVENOUS at 20:29

## 2025-01-01 RX ADMIN — HYDROMORPHONE HYDROCHLORIDE 0.5 MG: 1 INJECTION, SOLUTION INTRAMUSCULAR; INTRAVENOUS; SUBCUTANEOUS at 06:08

## 2025-01-01 RX ADMIN — ONDANSETRON 4 MG: 2 INJECTION INTRAMUSCULAR; INTRAVENOUS at 06:21

## 2025-01-01 RX ADMIN — LOPERAMIDE HYDROCHLORIDE 2 MG: 2 CAPSULE ORAL at 09:35

## 2025-01-01 RX ADMIN — ACETAMINOPHEN 650 MG: 325 TABLET ORAL at 17:05

## 2025-01-01 RX ADMIN — ACETAMINOPHEN 650 MG: 325 TABLET ORAL at 21:42

## 2025-01-01 RX ADMIN — LOPERAMIDE HYDROCHLORIDE 2 MG: 2 CAPSULE ORAL at 11:12

## 2025-01-01 RX ADMIN — HYDROCORTISONE SODIUM SUCCINATE 100 MG: 100 INJECTION, POWDER, FOR SOLUTION INTRAMUSCULAR; INTRAVENOUS at 23:19

## 2025-01-01 RX ADMIN — CARVEDILOL 3.12 MG: 6.25 TABLET, FILM COATED ORAL at 00:15

## 2025-01-01 RX ADMIN — ACETAMINOPHEN 650 MG: 325 TABLET ORAL at 17:50

## 2025-01-01 RX ADMIN — DEXTROSE MONOHYDRATE 25 G: 25 INJECTION, SOLUTION INTRAVENOUS at 14:12

## 2025-01-01 RX ADMIN — METHOCARBAMOL TABLETS 750 MG: 750 TABLET, COATED ORAL at 08:46

## 2025-01-01 RX ADMIN — GABAPENTIN 100 MG: 100 CAPSULE ORAL at 05:10

## 2025-01-01 RX ADMIN — SODIUM CHLORIDE, POTASSIUM CHLORIDE, SODIUM LACTATE AND CALCIUM CHLORIDE 500 ML: 600; 310; 30; 20 INJECTION, SOLUTION INTRAVENOUS at 18:26

## 2025-01-01 RX ADMIN — LOPERAMIDE HYDROCHLORIDE 2 MG: 2 CAPSULE ORAL at 16:46

## 2025-01-01 RX ADMIN — GABAPENTIN 100 MG: 100 CAPSULE ORAL at 05:13

## 2025-01-01 RX ADMIN — OXYCODONE 5 MG: 5 TABLET ORAL at 20:43

## 2025-01-01 RX ADMIN — ALBUMIN (HUMAN) 25 G: 0.25 INJECTION, SOLUTION INTRAVENOUS at 07:49

## 2025-01-01 RX ADMIN — POLYETHYLENE GLYCOL 3350 1 PACKET: 17 POWDER, FOR SOLUTION ORAL at 05:16

## 2025-01-01 RX ADMIN — LOPERAMIDE HYDROCHLORIDE 2 MG: 2 CAPSULE ORAL at 21:20

## 2025-01-01 RX ADMIN — HALOPERIDOL LACTATE 1 MG: 5 INJECTION, SOLUTION INTRAMUSCULAR at 05:45

## 2025-01-01 RX ADMIN — METHOCARBAMOL TABLETS 750 MG: 750 TABLET, COATED ORAL at 12:50

## 2025-01-01 RX ADMIN — HYDROCORTISONE SODIUM SUCCINATE 100 MG: 100 INJECTION, POWDER, FOR SOLUTION INTRAMUSCULAR; INTRAVENOUS at 18:16

## 2025-01-01 RX ADMIN — SODIUM BICARBONATE 50 MEQ: 84 INJECTION, SOLUTION INTRAVENOUS at 18:08

## 2025-01-01 RX ADMIN — DEXTROSE MONOHYDRATE 25 G: 25 INJECTION, SOLUTION INTRAVENOUS at 00:03

## 2025-01-01 RX ADMIN — HYDROCORTISONE SODIUM SUCCINATE 50 MG: 100 INJECTION, POWDER, FOR SOLUTION INTRAMUSCULAR; INTRAVENOUS at 17:18

## 2025-01-01 RX ADMIN — SCOPOLAMINE 1 PATCH: 1.5 PATCH, EXTENDED RELEASE TRANSDERMAL at 09:57

## 2025-01-01 RX ADMIN — NOREPINEPHRINE BITARTRATE 0.85 MCG/KG/MIN: 1 INJECTION, SOLUTION, CONCENTRATE INTRAVENOUS at 15:59

## 2025-01-01 RX ADMIN — ONDANSETRON 4 MG: 2 INJECTION INTRAMUSCULAR; INTRAVENOUS at 11:59

## 2025-01-01 RX ADMIN — SODIUM CHLORIDE 250 MG: 9 INJECTION, SOLUTION INTRAVENOUS at 05:05

## 2025-01-01 RX ADMIN — SODIUM CHLORIDE 250 MG: 9 INJECTION, SOLUTION INTRAVENOUS at 17:55

## 2025-01-01 RX ADMIN — OXYCODONE 5 MG: 5 TABLET ORAL at 19:53

## 2025-01-01 RX ADMIN — LIDOCAINE 1 PATCH: 4 PATCH TOPICAL at 23:01

## 2025-01-01 RX ADMIN — LIDOCAINE 1 PATCH: 4 PATCH TOPICAL at 20:39

## 2025-01-01 RX ADMIN — HYDROCORTISONE SODIUM SUCCINATE 50 MG: 100 INJECTION, POWDER, FOR SOLUTION INTRAMUSCULAR; INTRAVENOUS at 23:18

## 2025-01-01 RX ADMIN — GABAPENTIN 100 MG: 100 CAPSULE ORAL at 17:28

## 2025-01-01 RX ADMIN — ONDANSETRON 4 MG: 2 INJECTION INTRAMUSCULAR; INTRAVENOUS at 00:47

## 2025-01-01 RX ADMIN — SEVELAMER CARBONATE 800 MG: 800 TABLET, FILM COATED ORAL at 11:56

## 2025-01-01 RX ADMIN — DEXMEDETOMIDINE HYDROCHLORIDE 1 MCG/KG/HR: 100 INJECTION, SOLUTION INTRAVENOUS at 09:57

## 2025-01-01 RX ADMIN — SEVELAMER CARBONATE 800 MG: 800 TABLET, FILM COATED ORAL at 17:05

## 2025-01-01 RX ADMIN — OXYCODONE 5 MG: 5 TABLET ORAL at 23:09

## 2025-01-01 RX ADMIN — MORPHINE SULFATE 5 MG: 10 INJECTION INTRAVENOUS at 19:29

## 2025-01-01 RX ADMIN — SODIUM CHLORIDE 250 MG: 9 INJECTION, SOLUTION INTRAVENOUS at 19:14

## 2025-01-01 RX ADMIN — LANSOPRAZOLE 30 MG: 30 TABLET, ORALLY DISINTEGRATING, DELAYED RELEASE ORAL at 05:35

## 2025-01-01 RX ADMIN — EPOETIN ALFA-EPBX 3000 UNITS: 3000 INJECTION, SOLUTION INTRAVENOUS; SUBCUTANEOUS at 14:36

## 2025-01-01 RX ADMIN — LIDOCAINE 1 PATCH: 4 PATCH TOPICAL at 21:18

## 2025-01-01 RX ADMIN — OMEPRAZOLE 20 MG: 20 CAPSULE, DELAYED RELEASE ORAL at 05:16

## 2025-01-01 RX ADMIN — DOCUSATE SODIUM 100 MG: 100 CAPSULE, LIQUID FILLED ORAL at 05:16

## 2025-01-01 RX ADMIN — ACETAMINOPHEN 650 MG: 325 TABLET ORAL at 21:18

## 2025-01-01 RX ADMIN — EPOETIN ALFA-EPBX 3000 UNITS: 3000 INJECTION, SOLUTION INTRAVENOUS; SUBCUTANEOUS at 13:34

## 2025-01-01 RX ADMIN — SODIUM CHLORIDE 500 ML: 9 INJECTION, SOLUTION INTRAVENOUS at 20:17

## 2025-01-01 RX ADMIN — NOREPINEPHRINE BITARTRATE 0.21 MCG/KG/MIN: 1 INJECTION, SOLUTION, CONCENTRATE INTRAVENOUS at 09:12

## 2025-01-01 RX ADMIN — OXYCODONE 5 MG: 5 TABLET ORAL at 02:44

## 2025-01-01 RX ADMIN — ACETAMINOPHEN 650 MG: 325 TABLET ORAL at 21:12

## 2025-01-01 RX ADMIN — GABAPENTIN 100 MG: 100 CAPSULE ORAL at 05:55

## 2025-01-01 RX ADMIN — ACETAMINOPHEN 650 MG: 325 TABLET ORAL at 09:39

## 2025-01-01 RX ADMIN — ACETAMINOPHEN 1000 MG: 500 TABLET ORAL at 20:43

## 2025-01-01 RX ADMIN — OMEPRAZOLE 20 MG: 20 CAPSULE, DELAYED RELEASE ORAL at 05:13

## 2025-01-01 RX ADMIN — DEXTROSE MONOHYDRATE: 100 INJECTION, SOLUTION INTRAVENOUS at 05:54

## 2025-01-01 RX ADMIN — ONDANSETRON 4 MG: 2 INJECTION INTRAMUSCULAR; INTRAVENOUS at 02:59

## 2025-01-01 RX ADMIN — OMEPRAZOLE 20 MG: 20 CAPSULE, DELAYED RELEASE ORAL at 05:35

## 2025-01-01 RX ADMIN — ACETAMINOPHEN 650 MG: 325 TABLET ORAL at 14:11

## 2025-01-01 RX ADMIN — GABAPENTIN 100 MG: 100 CAPSULE ORAL at 05:35

## 2025-01-01 RX ADMIN — LABETALOL HYDROCHLORIDE 10 MG: 5 INJECTION, SOLUTION INTRAVENOUS at 23:00

## 2025-01-01 RX ADMIN — KETAMINE HYDROCHLORIDE 50 MG: 50 INJECTION INTRAMUSCULAR; INTRAVENOUS at 16:29

## 2025-01-01 RX ADMIN — LOPERAMIDE HYDROCHLORIDE 2 MG: 2 CAPSULE ORAL at 21:19

## 2025-01-01 RX ADMIN — ACETAMINOPHEN 650 MG: 325 TABLET ORAL at 12:47

## 2025-01-01 RX ADMIN — ACETAMINOPHEN 650 MG: 325 TABLET ORAL at 16:55

## 2025-01-01 RX ADMIN — EPINEPHRINE 1 MG: 0.1 INJECTION, SOLUTION INTRAVENOUS at 18:07

## 2025-01-01 RX ADMIN — NOREPINEPHRINE BITARTRATE 0.3 MCG/KG/MIN: 1 INJECTION, SOLUTION, CONCENTRATE INTRAVENOUS at 00:47

## 2025-01-01 RX ADMIN — DEXTROSE MONOHYDRATE: 100 INJECTION, SOLUTION INTRAVENOUS at 12:39

## 2025-01-01 RX ADMIN — NOREPINEPHRINE BITARTRATE 1 MCG/KG/MIN: 1 INJECTION, SOLUTION, CONCENTRATE INTRAVENOUS at 18:18

## 2025-01-01 RX ADMIN — LIDOCAINE 1 PATCH: 4 PATCH TOPICAL at 21:43

## 2025-01-01 RX ADMIN — FENTANYL CITRATE 25 MCG: 50 INJECTION, SOLUTION INTRAMUSCULAR; INTRAVENOUS at 10:09

## 2025-01-01 RX ADMIN — LIDOCAINE 1 PATCH: 4 PATCH TOPICAL at 21:12

## 2025-01-01 RX ADMIN — MAGNESIUM SULFATE HEPTAHYDRATE 2 G: 2 INJECTION, SOLUTION INTRAVENOUS at 23:00

## 2025-01-01 RX ADMIN — NOREPINEPHRINE BITARTRATE 0.17 MCG/KG/MIN: 1 INJECTION, SOLUTION, CONCENTRATE INTRAVENOUS at 21:35

## 2025-01-01 RX ADMIN — SEVELAMER CARBONATE 800 MG: 800 TABLET, FILM COATED ORAL at 16:56

## 2025-01-01 RX ADMIN — SEVELAMER CARBONATE 800 MG: 800 TABLET, FILM COATED ORAL at 17:50

## 2025-01-01 RX ADMIN — MIDAZOLAM HYDROCHLORIDE 0.5 MG: 1 INJECTION, SOLUTION INTRAMUSCULAR; INTRAVENOUS at 10:12

## 2025-01-01 RX ADMIN — PROPOFOL 30 MG: 10 INJECTION, EMULSION INTRAVENOUS at 13:30

## 2025-01-01 RX ADMIN — GABAPENTIN 100 MG: 100 CAPSULE ORAL at 16:55

## 2025-01-01 RX ADMIN — DEXTROSE MONOHYDRATE: 100 INJECTION, SOLUTION INTRAVENOUS at 06:25

## 2025-01-01 RX ADMIN — SEVELAMER CARBONATE 800 MG: 800 TABLET, FILM COATED ORAL at 07:42

## 2025-01-01 RX ADMIN — GABAPENTIN 100 MG: 100 CAPSULE ORAL at 17:50

## 2025-01-01 RX ADMIN — LABETALOL HYDROCHLORIDE 10 MG: 5 INJECTION, SOLUTION INTRAVENOUS at 15:44

## 2025-01-01 RX ADMIN — ACETAMINOPHEN 650 MG: 325 TABLET ORAL at 17:28

## 2025-01-01 RX ADMIN — GABAPENTIN 100 MG: 100 CAPSULE ORAL at 17:05

## 2025-01-01 RX ADMIN — CEFAZOLIN SODIUM 1 G: 1 INJECTION, SOLUTION INTRAVENOUS at 12:54

## 2025-01-01 RX ADMIN — NIFEDIPINE 90 MG: 30 TABLET, FILM COATED, EXTENDED RELEASE ORAL at 00:17

## 2025-01-01 RX ADMIN — NOREPINEPHRINE BITARTRATE 0.18 MCG/KG/MIN: 1 INJECTION, SOLUTION, CONCENTRATE INTRAVENOUS at 10:33

## 2025-01-01 RX ADMIN — ONDANSETRON 4 MG: 2 INJECTION INTRAMUSCULAR; INTRAVENOUS at 19:29

## 2025-01-01 RX ADMIN — VASOPRESSIN 0.03 UNITS/MIN: 20 INJECTION INTRAVENOUS at 15:32

## 2025-01-01 RX ADMIN — HYDROCORTISONE SODIUM SUCCINATE 50 MG: 100 INJECTION, POWDER, FOR SOLUTION INTRAMUSCULAR; INTRAVENOUS at 12:47

## 2025-01-01 RX ADMIN — ACETAMINOPHEN 650 MG: 325 TABLET ORAL at 09:08

## 2025-01-01 RX ADMIN — LIDOCAINE 1 PATCH: 4 PATCH TOPICAL at 21:26

## 2025-01-01 RX ADMIN — DEXTROSE MONOHYDRATE: 100 INJECTION, SOLUTION INTRAVENOUS at 01:12

## 2025-01-01 RX ADMIN — NOREPINEPHRINE BITARTRATE 0.16 MCG/KG/MIN: 1 INJECTION, SOLUTION, CONCENTRATE INTRAVENOUS at 08:57

## 2025-01-01 RX ADMIN — HYDRALAZINE HYDROCHLORIDE 25 MG: 25 TABLET ORAL at 00:17

## 2025-01-01 RX ADMIN — ACETAMINOPHEN 650 MG: 325 TABLET ORAL at 17:18

## 2025-01-01 RX ADMIN — LANSOPRAZOLE 30 MG: 30 TABLET, ORALLY DISINTEGRATING, DELAYED RELEASE ORAL at 05:00

## 2025-01-01 RX ADMIN — CALCIUM CHLORIDE 1 G: 100 INJECTION, SOLUTION INTRAVENOUS; INTRAVENTRICULAR at 18:08

## 2025-01-01 RX ADMIN — NOREPINEPHRINE BITARTRATE 0.1 MCG/KG/MIN: 1 INJECTION, SOLUTION, CONCENTRATE INTRAVENOUS at 10:49

## 2025-01-01 RX ADMIN — DEXTROSE MONOHYDRATE 25 G: 25 INJECTION, SOLUTION INTRAVENOUS at 07:07

## 2025-01-01 RX ADMIN — ACETAMINOPHEN 650 MG: 325 TABLET ORAL at 21:48

## 2025-01-01 RX ADMIN — ONDANSETRON 4 MG: 2 INJECTION INTRAMUSCULAR; INTRAVENOUS at 18:01

## 2025-01-01 RX ADMIN — ACETAMINOPHEN 650 MG: 325 TABLET ORAL at 15:08

## 2025-01-01 RX ADMIN — ACETAMINOPHEN 650 MG: 325 TABLET ORAL at 08:46

## 2025-01-01 RX ADMIN — GABAPENTIN 100 MG: 100 CAPSULE ORAL at 05:16

## 2025-01-01 RX ADMIN — GABAPENTIN 100 MG: 100 CAPSULE ORAL at 05:17

## 2025-01-01 RX ADMIN — OMEPRAZOLE 20 MG: 20 CAPSULE, DELAYED RELEASE ORAL at 05:17

## 2025-01-01 RX ADMIN — SODIUM CHLORIDE, POTASSIUM CHLORIDE, SODIUM LACTATE AND CALCIUM CHLORIDE 250 ML: 600; 310; 30; 20 INJECTION, SOLUTION INTRAVENOUS at 12:37

## 2025-01-01 RX ADMIN — NOREPINEPHRINE BITARTRATE 0.2 MCG/KG/MIN: 1 INJECTION, SOLUTION, CONCENTRATE INTRAVENOUS at 18:35

## 2025-01-01 RX ADMIN — OMEPRAZOLE 20 MG: 20 CAPSULE, DELAYED RELEASE ORAL at 05:10

## 2025-01-01 RX ADMIN — OXYCODONE 5 MG: 5 TABLET ORAL at 21:54

## 2025-01-01 RX ADMIN — HEPARIN SODIUM 1400 UNITS: 1000 INJECTION, SOLUTION INTRAVENOUS; SUBCUTANEOUS at 16:24

## 2025-01-01 RX ADMIN — PROCHLORPERAZINE EDISYLATE 5 MG: 5 INJECTION INTRAMUSCULAR; INTRAVENOUS at 18:46

## 2025-01-01 RX ADMIN — DEXMEDETOMIDINE HYDROCHLORIDE 1.4 MCG/KG/HR: 100 INJECTION, SOLUTION INTRAVENOUS at 03:35

## 2025-01-01 RX ADMIN — AMLODIPINE BESYLATE 10 MG: 5 TABLET ORAL at 01:00

## 2025-01-01 RX ADMIN — GABAPENTIN 100 MG: 100 CAPSULE ORAL at 17:18

## 2025-01-01 ASSESSMENT — PAIN DESCRIPTION - PAIN TYPE
TYPE: ACUTE PAIN

## 2025-01-01 ASSESSMENT — SOCIAL DETERMINANTS OF HEALTH (SDOH)
WITHIN THE LAST YEAR, HAVE YOU BEEN KICKED, HIT, SLAPPED, OR OTHERWISE PHYSICALLY HURT BY YOUR PARTNER OR EX-PARTNER?: NO
IN THE PAST 12 MONTHS, HAS THE ELECTRIC, GAS, OIL, OR WATER COMPANY THREATENED TO SHUT OFF SERVICE IN YOUR HOME?: NO
WITHIN THE LAST YEAR, HAVE TO BEEN RAPED OR FORCED TO HAVE ANY KIND OF SEXUAL ACTIVITY BY YOUR PARTNER OR EX-PARTNER?: NO
WITHIN THE PAST 12 MONTHS, YOU WORRIED THAT YOUR FOOD WOULD RUN OUT BEFORE YOU GOT THE MONEY TO BUY MORE: NEVER TRUE
WITHIN THE LAST YEAR, HAVE YOU BEEN HUMILIATED OR EMOTIONALLY ABUSED IN OTHER WAYS BY YOUR PARTNER OR EX-PARTNER?: NO
WITHIN THE LAST YEAR, HAVE YOU BEEN AFRAID OF YOUR PARTNER OR EX-PARTNER?: NO
WITHIN THE PAST 12 MONTHS, THE FOOD YOU BOUGHT JUST DIDN'T LAST AND YOU DIDN'T HAVE MONEY TO GET MORE: NEVER TRUE

## 2025-01-01 ASSESSMENT — PULMONARY FUNCTION TESTS
EPAP_CMH2O: 10
FVC: 0.5

## 2025-01-01 ASSESSMENT — LIFESTYLE VARIABLES
CONSUMPTION TOTAL: NEGATIVE
EVER FELT BAD OR GUILTY ABOUT YOUR DRINKING: NO
HAVE PEOPLE ANNOYED YOU BY CRITICIZING YOUR DRINKING: NO
DOES PATIENT WANT TO STOP DRINKING: NO
TOTAL SCORE: 0
TOTAL SCORE: 0
ALCOHOL_USE: YES
TOTAL SCORE: 0
ON A TYPICAL DAY WHEN YOU DRINK ALCOHOL HOW MANY DRINKS DO YOU HAVE: 1
HAVE YOU EVER FELT YOU SHOULD CUT DOWN ON YOUR DRINKING: NO
HOW MANY TIMES IN THE PAST YEAR HAVE YOU HAD 5 OR MORE DRINKS IN A DAY: 0
EVER HAD A DRINK FIRST THING IN THE MORNING TO STEADY YOUR NERVES TO GET RID OF A HANGOVER: NO
AVERAGE NUMBER OF DAYS PER WEEK YOU HAVE A DRINK CONTAINING ALCOHOL: 1

## 2025-01-01 ASSESSMENT — ENCOUNTER SYMPTOMS
FEVER: 0
MUSCULOSKELETAL NEGATIVE: 1
FOCAL WEAKNESS: 0
CHILLS: 0
DIZZINESS: 0
SENSORY CHANGE: 0
CONSTITUTIONAL NEGATIVE: 1
BACK PAIN: 0
NEUROLOGICAL NEGATIVE: 1
SHORTNESS OF BREATH: 0
SHORTNESS OF BREATH: 0
VOMITING: 0
ABDOMINAL PAIN: 0
GASTROINTESTINAL NEGATIVE: 1
MYALGIAS: 0
CHILLS: 0
MYALGIAS: 0
RESPIRATORY NEGATIVE: 1
ABDOMINAL PAIN: 0
NECK PAIN: 0
SHORTNESS OF BREATH: 0
COUGH: 0
COUGH: 0
FEVER: 0
FEVER: 0
NAUSEA: 0
COUGH: 0
CHILLS: 0
VOMITING: 0
ABDOMINAL PAIN: 0
TINGLING: 0
VOMITING: 0
HEARTBURN: 0
SPUTUM PRODUCTION: 0
NAUSEA: 0
EYES NEGATIVE: 1
SPUTUM PRODUCTION: 0
NAUSEA: 0
DIZZINESS: 0
HEARTBURN: 0
MYALGIAS: 0
FEVER: 0
SHORTNESS OF BREATH: 0
WEAKNESS: 0
CARDIOVASCULAR NEGATIVE: 1
ABDOMINAL PAIN: 0

## 2025-01-01 ASSESSMENT — COGNITIVE AND FUNCTIONAL STATUS - GENERAL
STANDING UP FROM CHAIR USING ARMS: A LITTLE
TURNING FROM BACK TO SIDE WHILE IN FLAT BAD: A LITTLE
DAILY ACTIVITIY SCORE: 22
HELP NEEDED FOR BATHING: A LITTLE
DRESSING REGULAR LOWER BODY CLOTHING: A LITTLE
SUGGESTED CMS G CODE MODIFIER MOBILITY: CK
WALKING IN HOSPITAL ROOM: A LITTLE
MOBILITY SCORE: 18
MOVING TO AND FROM BED TO CHAIR: A LITTLE
SUGGESTED CMS G CODE MODIFIER DAILY ACTIVITY: CJ
CLIMB 3 TO 5 STEPS WITH RAILING: A LITTLE
MOVING FROM LYING ON BACK TO SITTING ON SIDE OF FLAT BED: A LITTLE

## 2025-01-01 ASSESSMENT — PATIENT HEALTH QUESTIONNAIRE - PHQ9
SUM OF ALL RESPONSES TO PHQ9 QUESTIONS 1 AND 2: 0
2. FEELING DOWN, DEPRESSED, IRRITABLE, OR HOPELESS: NOT AT ALL
1. LITTLE INTEREST OR PLEASURE IN DOING THINGS: NOT AT ALL

## 2025-01-01 ASSESSMENT — FIBROSIS 4 INDEX
FIB4 SCORE: 3.08
FIB4 SCORE: 9.86
FIB4 SCORE: 9.86
FIB4 SCORE: 2.53
FIB4 SCORE: 4.19
FIB4 SCORE: 3.08

## 2025-01-01 ASSESSMENT — COPD QUESTIONNAIRES
COPD SCREENING SCORE: 1
HAVE YOU SMOKED AT LEAST 100 CIGARETTES IN YOUR ENTIRE LIFE: NO/DON'T KNOW
DO YOU EVER COUGH UP ANY MUCUS OR PHLEGM?: NO/ONLY WITH OCCASIONAL COLDS OR INFECTIONS
DURING THE PAST 4 WEEKS HOW MUCH DID YOU FEEL SHORT OF BREATH: NONE/LITTLE OF THE TIME

## 2025-01-14 PROBLEM — T14.90XA TRAUMA: Status: ACTIVE | Noted: 2025-01-01

## 2025-01-14 PROBLEM — S22.49XA: Status: ACTIVE | Noted: 2025-01-01

## 2025-01-14 PROBLEM — J90 PLEURAL EFFUSION ON RIGHT: Status: ACTIVE | Noted: 2025-01-01

## 2025-01-15 PROBLEM — Z78.9 NO CONTRAINDICATION TO DEEP VEIN THROMBOSIS (DVT) PROPHYLAXIS: Status: ACTIVE | Noted: 2025-01-01

## 2025-01-15 NOTE — H&P
"    TRAUMA HISTORY AND PHYSICAL    DATE OF SERVICE: 1/15/2025    ACTIVATION LEVEL: None.     HISTORY OF PRESENT ILLNESS: The patient is a 60 year-old male who was injured after a fall in the bathtub resulting in right chest wall trauma. He brought himself to the ER.  He has ongoing right chest wall pain without significant hypoxia.     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      CURRENT MEDICATIONS:   Home Medications       Reviewed by Watson Pereira (Pharmacy Tech) on 01/14/25 at 2340  Med List Status: Complete     Medication Last Dose Status   aspirin 81 MG EC tablet 1/14/2025 Active   carvedilol (COREG) 3.125 MG Tab 1/14/2025 Active   NIFEdipine SR (PROCADIA-XL) 90 MG CR tablet 1/13/2025 Active   omeprazole (PRILOSEC) 20 MG delayed-release capsule 1/14/2025 Active                  Audit from Redirected Encounters    **Home medications have not yet been reviewed for this encounter**        FAMILY HISTORY:   Reviewed and found to be non-contributory in regards to the above presentation    SOCIAL HISTORY:  reports that he has never smoked. He has never used smokeless tobacco. He reports current alcohol use. He reports that he does not use drugs.      REVIEW OF SYSTEMS:   Comprehensive review of systems is negative with the exception of the aforementioned HPI, PMH, and PSH bullets in accordance with CMS guidelines.    PHYSICAL EXAMINATION:   Vital Signs: BP (!) 209/99   Pulse 80   Temp 35.9 °C (96.7 °F) (Temporal)   Resp 18   Ht 1.727 m (5' 8\")   Wt 54.4 kg (120 lb)   SpO2 93%   Physical " Exam    LABORATORY VALUES:   Recent Labs     01/14/25  2340   WBC 6.1   RBC 3.75*   HEMOGLOBIN 10.1*   HEMATOCRIT 32.8*   MCV 87.5   MCH 26.9*   MCHC 30.8*   RDW 53.9*   PLATELETCT 178   MPV 9.0     Recent Labs     01/14/25  2340   SODIUM 137   POTASSIUM 3.8   CHLORIDE 95*   CO2 27   GLUCOSE 60*   BUN 28*   CREATININE 5.19*   CALCIUM 8.8     Recent Labs     01/14/25  2340   ASTSGOT 19   ALTSGPT 6   TBILIRUBIN 1.0   ALKPHOSPHAT 170*   GLOBULIN 3.7*   INR 1.13     Recent Labs     01/14/25  2340   APTT 31.0   INR 1.13        IMAGING:   CT-CHEST (THORAX) W/O   Final Result         1.  Right posterior eighth through 11th rib fractures, with flail segment of the ninth rib.   2.  Large layering right pleural effusion   3.  Linear consolidations in the right lung base favoring atelectasis, component of infiltrate not excluded.   4.  Atherosclerosis and atherosclerotic coronary artery disease   5.  Irregular hepatic contour favoring changes of cirrhosis      DX-PELVIS-1 OR 2 VIEWS   Final Result         1.  No acute traumatic bony injury.   2.  Atherosclerosis      [id number] (WITH 1-VIEW CXR) RIGHT   Final Result         1.  Right lateral eighth rib fracture and posterior right 10th and 11th rib fractures   2.  Segmental right ninth rib fracture, compatible with component of flail chest.   3.  Moderate to large layering right pleural effusion   4.  Scattered patchy bilateral pulmonary infiltrates   5.  Atherosclerosis      DX-CHEST-PORTABLE (1 VIEW)    (Results Pending)   US-THORACENTESIS PUNCTURE RIGHT    (Results Pending)       IMPRESSION AND PLAN:  * Trauma- (present on admission)  Assessment & Plan  Ground level fall.  Non Trauma Activation.  Edward Singleton MD. Trauma Surgery    Pleural effusion on right- (present on admission)  Assessment & Plan  Noted on chest x-ray from 10/2024  Much larger on today's x-ray  CT shows very large effusion, unknown if only related to increase of previous effusion or has a  hemothorax component  1/15 Thoracentesis PENDING to evaluate fluid    Closed fracture of four ribs, initial encounter- (present on admission)  Assessment & Plan  Right eighth, ninth, tenth, and eleventh rib fractures; flail segment of rib nine.  Aggressive pulmonary hygiene and multimodal pain management    Essential (primary) hypertension- (present on admission)  Assessment & Plan  Uncontrolled, SBP >200mmHg in ED.   Chronic condition treated with carvedilol and nifedipine.          Aggregated care time spent evaluating, reviewing documentation, providing care, and managing this patient exclusive of procedures: 45 minutes  ____________________________________   ELSI Louise / SAMANTHA     DD: 1/15/2025   DT: 12:49 AM

## 2025-01-15 NOTE — ED NOTES
Patient returned from US. Reported dizziness and nausea. Medicated per MAR. Dizziness resolved prior to medication administration, nausea resolved after medication administration. Patient resting calmly on gurney at this time. Denies further needs. Call light within reach.

## 2025-01-15 NOTE — PROGRESS NOTES
"Bedside report received.  Assessment complete.  A&O x 4. Patient calls appropriately.  Patient ambulates with standby/one assist. Bed alarm off.   Patient has 0/10 pain. Patient medicated per MAR.  Denies N&V. Tolerating renal diet.  Surgical dressing CDI.  anuric, + flatus, last BM 1/14 PTA.  Patient denies SOB.  SCD's off.  Patient is pleasant and cooperative with the care plan.  Review plan with of care with patient. Call light and personal belongings within reach. Hourly rounding in place. All needs met at this time.    /57   Pulse 67   Temp 36.2 °C (97.2 °F) (Temporal)   Resp 16   Ht 1.727 m (5' 8\")   Wt 53 kg (116 lb 13.5 oz)   SpO2 97%   BMI 17.77 kg/m²     "

## 2025-01-15 NOTE — ED PROVIDER NOTES
ED Provider Note    CHIEF COMPLAINT  Chief Complaint   Patient presents with    Rib Pain     Pt reports he fell in the bathtub after slipping on soap and fell onto his R side rib cage.         EXTERNAL RECORDS REVIEWED  Patient was discharged in October of last year after hospital stay for acute hypoxic respiratory failure secondary to pneumonia and fluid overload.    HPI/ROS  LIMITATION TO HISTORY   Language, Marshallese, patient prefers his son to translate.  OUTSIDE HISTORIAN(S):  Son    Zack Gale is a 60 y.o. male who presents to the ER for right lateral rib pain.  He was in his shower, slipped on the slippery floor and fell, hitting his right rib cage.  Denies head trauma or loss of consciousness.  No neck pain.  Patient has very mild right hip pain and right lateral chest wall pain.  He was able to get himself up and ambulate.    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    SURGICAL HISTORY   has a past surgical history that includes other cardiac surgery.    FAMILY HISTORY  No family history on file.    SOCIAL HISTORY  Social History     Tobacco Use    Smoking status: Never    Smokeless tobacco: Never   Vaping Use    Vaping status: Never Used   Substance and Sexual Activity    Alcohol use: Yes     Comment: occassionally    Drug use: No    Sexual activity: Not on file       CURRENT MEDICATIONS  Home Medications       Reviewed by Watson Pereira (Pharmacy Tech) on 01/14/25 at 2340  Med List Status: Complete     Medication Last Dose Status   aspirin 81 MG EC tablet 1/14/2025 Active   carvedilol (COREG) 3.125 MG Tab 1/14/2025 Active   NIFEdipine SR (PROCADIA-XL) 90 MG CR tablet 1/13/2025 Active   omeprazole (PRILOSEC) 20 MG delayed-release capsule 1/14/2025  "Active                  Audit from Redirected Encounters    **Home medications have not yet been reviewed for this encounter**         ALLERGIES  No Known Allergies    PHYSICAL EXAM  VITAL SIGNS: BP (!) 209/99   Pulse 80   Temp 35.9 °C (96.7 °F) (Temporal)   Resp 18   Ht 1.727 m (5' 8\")   Wt 54.4 kg (120 lb)   SpO2 92%   BMI 18.25 kg/m²    General: Laying calmly in stretcher, no distress  Head: NCAT  Eyes: Pupils equal and reactive  Neck: No midline or paraspinal C-spine tenderness  CV: Regular rate and rhythm, no murmurs  Pulmonary: CTAB  Chest: Tender to palpation over the right lateral lower chest wall.  No crepitus  Abdomen: Soft nondistended nontender  Pelvis: Mild tenderness over the right hip  Extremities: No deformities, bruising or abrasions to the arms or legs    EKG/LABS  CBC with stable anemia, no leukocytosis.  Coags normal.  CMP compatible with ESRD elevated creatinine and BUN.  Electrolytes normal.    RADIOLOGY/PROCEDURES   I have independently interpreted the diagnostic imaging associated with this visit and am waiting the final reading from the radiologist.   My preliminary interpretation is as follows: Right 8 through 11 rib fractures with segmental fracture on rib 9    Radiologist interpretation:  CT-CHEST (THORAX) W/O   Final Result         1.  Right posterior eighth through 11th rib fractures, with flail segment of the ninth rib.   2.  Large layering right pleural effusion   3.  Linear consolidations in the right lung base favoring atelectasis, component of infiltrate not excluded.   4.  Atherosclerosis and atherosclerotic coronary artery disease   5.  Irregular hepatic contour favoring changes of cirrhosis      DX-PELVIS-1 OR 2 VIEWS   Final Result         1.  No acute traumatic bony injury.   2.  Atherosclerosis      [id number] (WITH 1-VIEW CXR) RIGHT   Final Result         1.  Right lateral eighth rib fracture and posterior right 10th and 11th rib fractures   2.  Segmental " right ninth rib fracture, compatible with component of flail chest.   3.  Moderate to large layering right pleural effusion   4.  Scattered patchy bilateral pulmonary infiltrates   5.  Atherosclerosis      DX-CHEST-PORTABLE (1 VIEW)    (Results Pending)   US-THORACENTESIS PUNCTURE RIGHT    (Results Pending)       COURSE & MEDICAL DECISION MAKING    ASSESSMENT, COURSE AND PLAN  Care Narrative: Differential includes rib fracture, pulmonary contusion, pneumothorax, hemothorax, hip fracture, liver laceration    On arrival patient is overall well-appearing, resting comfortably.  He is hypertensive I suspect slightly exacerbated due to pain but also he is on dialysis and due for dialysis tomorrow.  Tender to palpation of the right lateral chest wall and also mild tenderness of the right hip.  Abdomen is benign.  No signs of head trauma.  No neck tenderness.  Differential above considered.  Patient was given Tylenol and oxycodone for pain.  X-rays rib series on the right and pelvis was obtained.  Patient was found to have right 8 through 11 rib fractures with flail segment of rib 9.  No hip fractures.  Moderate size pleural effusion on the right, on comparison to prior chest x-rays most recently in October of last year, this is larger.  Suspect this is most likely simple pleural effusion rather than hemothorax however given numerous rib fractures cannot rule hemothorax at this time.  He is resting comfortably and breathing comfortably I do not feel emergent thoracotomy is necessary.  The patient was still having pain so he was given additional dose of oxycodone and also lidocaine patch.  I spoke with the trauma team Dr. Chacon who accepted the patient for admission.  Patient's nephrologist is Dr. Calvo, admitting team will reach out to them for him to get dialysis tomorrow.    DISPOSITION AND DISCUSSIONS  I have discussed management of the patient with the following physicians and ITZEL's:  Oswaldo, trauma    Discussion of  management with other [facility] or appropriate source(s): None         FINAL DIAGNOSIS  1. Closed fracture of multiple ribs of right side, initial encounter         Electronically signed by: Piyush Roberts M.D., 1/14/2025 8:11 PM

## 2025-01-15 NOTE — ASSESSMENT & PLAN NOTE
Uncontrolled, SBP >200mmHg in ED.   Chronic condition treated with carvedilol and nifedipine.  Amlodipine initiated.  PRN antihypertensives

## 2025-01-15 NOTE — ED NOTES
Patient reports some continued dizziness. FSBG 72. BP 96/51. Kristopher VELASCO notified. Orders obtained.

## 2025-01-15 NOTE — ASSESSMENT & PLAN NOTE
HD MWF outpatient  Nephrology consulted from ER  Dialysis per nephrology  Catarina Calvo M.D., Nephrology.

## 2025-01-15 NOTE — ED NOTES
Medicated patient per MAR. Patient awake and eating meal tray. Patient denies further needs. Call light within reach.

## 2025-01-15 NOTE — ED NOTES
Phone report to [facility] NICOLE Servin. Patient transported to [facility] with chart and all belongings by patient transport. Care transferred.

## 2025-01-15 NOTE — PROGRESS NOTES
Trauma / Surgical Daily Progress Note    Date of Service  1/15/2025    Chief Complaint  60 y.o. male admitted 1/14/2025 with rib fractures and pleural effusion status post ground level fall,    Interval Events    Tertiary exam.   Adequate pain control.   Thoracentesis pending     Review of Systems  Review of Systems   Constitutional: Negative.    HENT:  Positive for hearing loss.    Eyes: Negative.    Respiratory: Negative.     Cardiovascular: Negative.    Gastrointestinal: Negative.    Genitourinary: Negative.    Musculoskeletal: Negative.    Neurological: Negative.         Vital Signs  Temp:  [35.9 °C (96.7 °F)] 35.9 °C (96.7 °F)  Pulse:  [65-85] 65  Resp:  [18] 18  BP: (145-242)/() 149/70  SpO2:  [92 %-97 %] 92 %    Physical Exam  Physical Exam  Vitals and nursing note reviewed.   Constitutional:       General: He is awake. He is not in acute distress.     Appearance: Normal appearance. He is not ill-appearing, toxic-appearing or diaphoretic.   HENT:      Head: Normocephalic and atraumatic.   Eyes:      General:         Right eye: No discharge.         Left eye: No discharge.   Cardiovascular:      Rate and Rhythm: Normal rate.      Pulses: Normal pulses.   Pulmonary:      Effort: No tachypnea, accessory muscle usage or respiratory distress.   Chest:      Chest wall: Tenderness present.   Abdominal:      General: There is no distension.      Tenderness: There is no abdominal tenderness. There is no guarding or rebound.   Musculoskeletal:      Cervical back: Normal range of motion and neck supple.      Comments: Moves all extremities.    Skin:     General: Skin is warm and dry.      Capillary Refill: Capillary refill takes less than 2 seconds.   Neurological:      Mental Status: He is alert.      Comments: Conversant   Psychiatric:         Mood and Affect: Mood normal.         Behavior: Behavior normal. Behavior is cooperative.         Laboratory  Recent Results (from the past 24 hours)   CBC WITH  DIFFERENTIAL    Collection Time: 01/14/25 11:40 PM   Result Value Ref Range    WBC 6.1 4.8 - 10.8 K/uL    RBC 3.75 (L) 4.70 - 6.10 M/uL    Hemoglobin 10.1 (L) 14.0 - 18.0 g/dL    Hematocrit 32.8 (L) 42.0 - 52.0 %    MCV 87.5 81.4 - 97.8 fL    MCH 26.9 (L) 27.0 - 33.0 pg    MCHC 30.8 (L) 32.3 - 36.5 g/dL    RDW 53.9 (H) 35.9 - 50.0 fL    Platelet Count 178 164 - 446 K/uL    MPV 9.0 9.0 - 12.9 fL    Neutrophils-Polys 60.40 44.00 - 72.00 %    Lymphocytes 29.30 22.00 - 41.00 %    Monocytes 6.70 0.00 - 13.40 %    Eosinophils 2.30 0.00 - 6.90 %    Basophils 1.00 0.00 - 1.80 %    Immature Granulocytes 0.30 0.00 - 0.90 %    Nucleated RBC 0.00 0.00 - 0.20 /100 WBC    Neutrophils (Absolute) 3.67 1.82 - 7.42 K/uL    Lymphs (Absolute) 1.78 1.00 - 4.80 K/uL    Monos (Absolute) 0.41 0.00 - 0.85 K/uL    Eos (Absolute) 0.14 0.00 - 0.51 K/uL    Baso (Absolute) 0.06 0.00 - 0.12 K/uL    Immature Granulocytes (abs) 0.02 0.00 - 0.11 K/uL    NRBC (Absolute) 0.00 K/uL   Comp Metabolic Panel    Collection Time: 01/14/25 11:40 PM   Result Value Ref Range    Sodium 137 135 - 145 mmol/L    Potassium 3.8 3.6 - 5.5 mmol/L    Chloride 95 (L) 96 - 112 mmol/L    Co2 27 20 - 33 mmol/L    Anion Gap 15.0 7.0 - 16.0    Glucose 60 (L) 65 - 99 mg/dL    Bun 28 (H) 8 - 22 mg/dL    Creatinine 5.19 (HH) 0.50 - 1.40 mg/dL    Calcium 8.8 8.5 - 10.5 mg/dL    Correct Calcium 9.0 8.5 - 10.5 mg/dL    AST(SGOT) 19 12 - 45 U/L    ALT(SGPT) 6 2 - 50 U/L    Alkaline Phosphatase 170 (H) 30 - 99 U/L    Total Bilirubin 1.0 0.1 - 1.5 mg/dL    Albumin 3.7 3.2 - 4.9 g/dL    Total Protein 7.4 6.0 - 8.2 g/dL    Globulin 3.7 (H) 1.9 - 3.5 g/dL    A-G Ratio 1.0 g/dL   APTT    Collection Time: 01/14/25 11:40 PM   Result Value Ref Range    APTT 31.0 24.7 - 36.0 sec   Prothrombin Time    Collection Time: 01/14/25 11:40 PM   Result Value Ref Range    PT 14.6 12.0 - 14.6 sec    INR 1.13 0.87 - 1.13   PLATELET MAPPING WITH BASIC TEG    Collection Time: 01/14/25 11:40 PM   Result  Value Ref Range    Reaction Time Initial-R 6.1 4.6 - 9.1 min    React Time Initial Hep 6.0 4.3 - 8.3 min    Clot Kinetics-K 1.2 0.8 - 2.1 min    Clot Angle-Angle 74.7 63.0 - 78.0 degrees    Maximum Clot Strength-MA 56.1 52.0 - 69.0 mm    TEG Functional Fibrinogen(MA) 19.0 15.0 - 32.0 mm    Lysis 30 minutes-LY30 0.1 0.0 - 2.6 %    % Inhibition ADP 16.9 0.0 - 17.0 %    % Inhibition AA 56.0 (H) 0.0 - 11.0 %    TEG Algorithm Link Algorithm    ESTIMATED GFR    Collection Time: 01/14/25 11:40 PM   Result Value Ref Range    GFR (CKD-EPI) 12 (A) >60 mL/min/1.73 m 2   POCT glucose device results    Collection Time: 01/15/25  1:09 AM   Result Value Ref Range    POC Glucose, Blood 46 (L) 65 - 99 mg/dL   POCT glucose device results    Collection Time: 01/15/25  1:36 AM   Result Value Ref Range    POC Glucose, Blood 65 65 - 99 mg/dL   POCT glucose device results    Collection Time: 01/15/25  2:46 AM   Result Value Ref Range    POC Glucose, Blood 107 (H) 65 - 99 mg/dL   POCT glucose device results    Collection Time: 01/15/25  8:51 AM   Result Value Ref Range    POC Glucose, Blood 108 (H) 65 - 99 mg/dL       Fluids  No intake or output data in the 24 hours ending 01/15/25 1001    Core Measures & Quality Metrics  Labs reviewed, Medications reviewed and Radiology images reviewed  Lopez catheter: No Lopez      DVT Prophylaxis: Heparin  DVT prophylaxis - mechanical: SCDs  Ulcer prophylaxis: Not indicated    Assessed for rehab: Patient returned to prior level of function, rehabilitation not indicated at this time    RAP Score Total: 4    CAGE Results: not completed Blood Alcohol>0.08: not completed     Mental status adequate for full examination?: Yes    Spine cleared (radiologically and/or clinically): Yes    All current laboratory studies/radiology exams reviewed: Yes    Medications reconciliation has been reviewed: Yes    Completed Consultations:  Nephrology      Pending Consultations:  None    Newly identified diagnoses, injuries  and/or co-morbidities:  None    PDI Not completed at time of tertiary exam    Assessment/Plan  * Trauma- (present on admission)  Assessment & Plan  Ground level fall.  Non Trauma Activation.  Edward Singleton MD. Trauma Surgery    Pleural effusion on right- (present on admission)  Assessment & Plan  Noted on chest x-ray from 10/2024  Much larger on today's x-ray  CT shows very large effusion, unknown if only related to increase of previous effusion or has a hemothorax component  1/15 Thoracentesis PENDING to evaluate fluid    Closed fracture of four ribs, initial encounter- (present on admission)  Assessment & Plan  Right eighth, ninth, tenth, and eleventh rib fractures; flail segment of rib nine.  Aggressive pulmonary hygiene and multimodal pain management    Hypoglycemia- (present on admission)  Assessment & Plan  Admission serum glucose 60.   Serial glucose monitoring.   Hypoglycemia protocol.   Monitor.     Essential (primary) hypertension- (present on admission)  Assessment & Plan  Uncontrolled, SBP >200mmHg in ED.   Chronic condition treated with carvedilol and nifedipine.  Amlodipine initiated.  PRN antihypertensives.    ESRD needing dialysis (HCC)- (present on admission)  Assessment & Plan  HD MWF  Nephrology consulted from ER    No contraindication to deep vein thrombosis (DVT) prophylaxis- (present on admission)  Assessment & Plan  Prophylactic dose heparin 5000 u q 8 hr initiated upon admission.    Coronary artery disease involving coronary bypass graft of native heart without angina pectoris- (present on admission)  Assessment & Plan  Chronic condition treated with aspirin.  Holding maintenance medication during acute traumatic illness.      Discussed patient condition with Patient and trauma surgery, Dr. Edward Chacon.

## 2025-01-15 NOTE — ASSESSMENT & PLAN NOTE
Right eighth, ninth, tenth, and eleventh rib fractures; flail segment of rib nine.  Aggressive pulmonary hygiene and multimodal pain management.

## 2025-01-15 NOTE — ASSESSMENT & PLAN NOTE
CT shows very large effusion, unknown if only related to increase of previous effusion or has a hemothorax component.  1/15 Thoracentesis completed.  Residual pleural fluid collection.  1/16 IR chest tube placement, pleurevac filled and replaced.  1/19 IR drain replaced chest tube for persistent increasing pneumothorax  1/20 Chest Xray with no pneumothorax noted/ no air leak/300ml in collection chamber/continue suction.  Serial chest Xray

## 2025-01-15 NOTE — CARE PLAN
Problem: Pain - Standard  Goal: Alleviation of pain or a reduction in pain to the patient’s comfort goal  Outcome: Progressing     Problem: Knowledge Deficit - Standard  Goal: Patient and family/care givers will demonstrate understanding of plan of care, disease process/condition, diagnostic tests and medications  Outcome: Progressing   The patient is Stable - Low risk of patient condition declining or worsening    Shift Goals  Clinical Goals: pulmonary hygiene, patient safety  Patient Goals: rest    Progress made toward(s) clinical / shift goals:  patient utilizing incentive spirometer @ 900. Patient remained safe from injury     Patient is not progressing towards the following goals:

## 2025-01-15 NOTE — ED NOTES
Bedside report received from off going RN: Castillo, assumed care of patient.  POC discussed with patient. Call light within reach, all needs addressed at this time.       Fall risk interventions in place: Move the patient closer to the nurse's station, Patient's personal possessions are with in their safe reach, Place socks on patient, Keep floor surfaces clean and dry, and Accompanied to restroom (all applicable per [place] Fall risk assessment)   Continuous monitoring: Cardiac Leads, Pulse Ox, or Blood Pressure  IVF/IV medications: Not Applicable   Oxygen: Room Air  Bedside sitter: Not Applicable   Isolation: Not Applicable

## 2025-01-15 NOTE — ED NOTES
Previously transport was delayed, patient has been sleeping in the room. Patient transport at bedside now transporting patient to US on 2 L O2 NC.

## 2025-01-15 NOTE — ED NOTES
.Bedside report received from off going RN/, assumed care of patient.  POC discussed with patient. Call light within reach, all needs addressed at this time.       Fall risk interventions in place: Move the patient closer to the nurse's station and Place fall risk sign on patient's door (all applicable per [place] Fall risk assessment)   Continuous monitoring: Pulse Ox or Blood Pressure  IVF/IV medications: Not Applicable   Oxygen: How many liters 2L  Bedside sitter: Not Applicable   Isolation: Not Applicable

## 2025-01-15 NOTE — ED NOTES
Bedside report received from NICOLE Boyer. Assumed care of patient. Patient being transported to US for thoracentesis.

## 2025-01-15 NOTE — ED TRIAGE NOTES
"Zack Gale  60 y.o.  male  To triage via w/c for     Chief Complaint   Patient presents with    Rib Pain     Pt reports he fell in the bathtub after slipping on soap and fell onto his R side rib cage.       Pt has lg swelling and tender area to R ribcage.  Pt reports pain worse w/ deep breath or cough.  Pt takes ASA.  Pt denies head strike or LOC.  Pt denies any other injury.    BP (!) 242/117   Pulse 69   Temp 35.9 °C (96.7 °F) (Temporal)   Resp 18   Ht 1.727 m (5' 8\")   Wt 54.4 kg (120 lb)   SpO2 97%   BMI 18.25 kg/m²     "

## 2025-01-15 NOTE — ED NOTES
Med rec update and complete. Allergies reviewed.    Pt confirmed name and date of birth.    Pt denies anticoagulant medications.   Last ASA dose 01/14/25.    Pt denies antibiotic use in last 30 days.    Pt receives dialysis on Monday, Wednesday , Friday at [facility] [phone].  Pt is unsure of [facility] during dialysis.    Removed medications from med rec that pt is not currently taking.    Preferred pharmacy  [facility] = [phone].

## 2025-01-15 NOTE — PROGRESS NOTES
0215: Bedside report received, assumed care of patient. Pt resting on gurney, breathing even and unlabored, denies pain and in no acute distress. A&O x4. Call light within reach, bed locked and in lowest position, denies other needs at this time.

## 2025-01-16 PROBLEM — E27.40 ADRENAL INSUFFICIENCY (HCC): Status: ACTIVE | Noted: 2025-01-01

## 2025-01-16 NOTE — PROGRESS NOTES
1638 BP 83/49 P 55  Patient up eating, feeling slightly lightheaded.    Rechecked @ 1721 BP 69/39 P 53    Updated Trauma APRN - fluid bolus initiated, labs, chest xray ordered

## 2025-01-16 NOTE — ASSESSMENT & PLAN NOTE
Cortisol level 13.5.  100 mg hydrocortisone IV, SBP improved.  1/16 50 mg hydrocortisone IV for 3 doses ordered.  Monitor response supplement as needed.

## 2025-01-16 NOTE — PROGRESS NOTES
Trauma / Surgical Daily Progress Note    Date of Service  1/16/2025    Chief Complaint  60 y.o. male admitted 1/14/2025 with rib fractures after fall in shower.    Interval Events  Low cortisol overnight, given hydrocortisone.  Chest x-ray with continued right effusion.  No complaints of pain.  No nausea.  IR drain scheduled for today.  Dialysis today per nephrology note.    - Continue hydrocortisone for 3 more doses  - May have renal diet after IR  - Follow chest x-rays  - Mobilize    Review of Systems  Review of Systems   Constitutional:  Positive for malaise/fatigue. Negative for chills and fever.   Respiratory:  Negative for cough and shortness of breath.    Gastrointestinal:  Negative for abdominal pain, nausea and vomiting.   Genitourinary: Negative.    Musculoskeletal:  Negative for back pain, myalgias and neck pain.   Neurological:  Negative for tingling, sensory change and focal weakness.        Vital Signs  Temp:  [36.2 °C (97.2 °F)-37 °C (98.6 °F)] 36.3 °C (97.3 °F)  Pulse:  [48-67] 56  Resp:  [8-18] 11  BP: ()/(39-64) 116/59  SpO2:  [95 %-100 %] 100 %    Physical Exam  Physical Exam  Vitals and nursing note reviewed.   Constitutional:       General: He is awake. He is not in acute distress.     Appearance: Normal appearance. He is not ill-appearing.   HENT:      Head: Normocephalic.      Right Ear: External ear normal.      Left Ear: External ear normal.      Nose: Nose normal.      Mouth/Throat:      Mouth: Mucous membranes are moist.   Eyes:      General: No scleral icterus.        Right eye: No discharge.         Left eye: No discharge.   Cardiovascular:      Rate and Rhythm: Normal rate and regular rhythm.      Pulses: Normal pulses.   Pulmonary:      Effort: Pulmonary effort is normal. No respiratory distress.      Breath sounds: Normal breath sounds.   Chest:      Chest wall: Tenderness present.   Abdominal:      General: There is no distension.      Palpations: Abdomen is soft.       Tenderness: There is no abdominal tenderness.   Musculoskeletal:      Cervical back: Neck supple.      Comments: Moves all extremities without limitation   Skin:     General: Skin is warm and dry.      Capillary Refill: Capillary refill takes less than 2 seconds.   Neurological:      Mental Status: He is alert and oriented to person, place, and time.      GCS: GCS eye subscore is 4. GCS verbal subscore is 5. GCS motor subscore is 6.   Psychiatric:         Attention and Perception: Attention normal.         Mood and Affect: Mood normal.         Speech: Speech normal.         Behavior: Behavior is cooperative.         Thought Content: Thought content normal.         Laboratory  Recent Results (from the past 24 hours)   POCT glucose device results    Collection Time: 01/15/25 12:20 PM   Result Value Ref Range    POC Glucose, Blood 72 65 - 99 mg/dL   CBC WITHOUT DIFFERENTIAL    Collection Time: 01/15/25  5:58 PM   Result Value Ref Range    WBC 4.2 (L) 4.8 - 10.8 K/uL    RBC 3.22 (L) 4.70 - 6.10 M/uL    Hemoglobin 8.4 (L) 14.0 - 18.0 g/dL    Hematocrit 27.7 (L) 42.0 - 52.0 %    MCV 86.0 81.4 - 97.8 fL    MCH 26.1 (L) 27.0 - 33.0 pg    MCHC 30.3 (L) 32.3 - 36.5 g/dL    RDW 51.8 (H) 35.9 - 50.0 fL    Platelet Count 149 (L) 164 - 446 K/uL    MPV 9.6 9.0 - 12.9 fL   RETICULOCYTES COUNT    Collection Time: 01/15/25  5:58 PM   Result Value Ref Range    Reticulocyte Count 2.3 0.8 - 2.6 %    Retic, Absolute 0.07 0.04 - 0.12 M/uL    Imm. Reticulocyte Fraction 23.8 (H) 2.6 - 16.1 %    Retic Hgb Equivalent 24.5 (L) 29.0 - 35.0 pg/cell   IRON/TOTAL IRON BIND    Collection Time: 01/15/25  5:58 PM   Result Value Ref Range    Iron 34 (L) 50 - 180 ug/dL    Total Iron Binding 134 (L) 250 - 450 ug/dL    Unsat Iron Binding 100 (L) 110 - 370 ug/dL    % Saturation 25 15 - 55 %   CORTISOL    Collection Time: 01/15/25  5:58 PM   Result Value Ref Range    Cortisol 13.5 0.0 - 23.0 ug/dL   POCT glucose device results    Collection Time: 01/15/25   8:13 PM   Result Value Ref Range    POC Glucose, Blood 89 65 - 99 mg/dL   POCT glucose device results    Collection Time: 01/16/25 12:23 AM   Result Value Ref Range    POC Glucose, Blood 100 (H) 65 - 99 mg/dL   CBC WITHOUT DIFFERENTIAL    Collection Time: 01/16/25  1:01 AM   Result Value Ref Range    WBC 4.4 (L) 4.8 - 10.8 K/uL    RBC 2.96 (L) 4.70 - 6.10 M/uL    Hemoglobin 7.8 (L) 14.0 - 18.0 g/dL    Hematocrit 25.6 (L) 42.0 - 52.0 %    MCV 86.5 81.4 - 97.8 fL    MCH 26.4 (L) 27.0 - 33.0 pg    MCHC 30.5 (L) 32.3 - 36.5 g/dL    RDW 52.1 (H) 35.9 - 50.0 fL    Platelet Count 146 (L) 164 - 446 K/uL    MPV 10.0 9.0 - 12.9 fL   Basic Metabolic Panel    Collection Time: 01/16/25  1:01 AM   Result Value Ref Range    Sodium 137 135 - 145 mmol/L    Potassium 4.3 3.6 - 5.5 mmol/L    Chloride 95 (L) 96 - 112 mmol/L    Co2 30 20 - 33 mmol/L    Glucose 106 (H) 65 - 99 mg/dL    Bun 36 (H) 8 - 22 mg/dL    Creatinine 5.98 (HH) 0.50 - 1.40 mg/dL    Calcium 7.9 (L) 8.5 - 10.5 mg/dL    Anion Gap 12.0 7.0 - 16.0   MAGNESIUM    Collection Time: 01/16/25  1:01 AM   Result Value Ref Range    Magnesium 2.1 1.5 - 2.5 mg/dL   PHOSPHORUS    Collection Time: 01/16/25  1:01 AM   Result Value Ref Range    Phosphorus 5.1 (H) 2.5 - 4.5 mg/dL   PREALBUMIN    Collection Time: 01/16/25  1:01 AM   Result Value Ref Range    Pre-Albumin 8.0 (L) 18.0 - 38.0 mg/dL   Prothrombin Time    Collection Time: 01/16/25  1:01 AM   Result Value Ref Range    PT 15.6 (H) 12.0 - 14.6 sec    INR 1.23 (H) 0.87 - 1.13   APTT    Collection Time: 01/16/25  1:01 AM   Result Value Ref Range    APTT 33.8 24.7 - 36.0 sec   ESTIMATED GFR    Collection Time: 01/16/25  1:01 AM   Result Value Ref Range    GFR (CKD-EPI) 10 (A) >60 mL/min/1.73 m 2   POCT glucose device results    Collection Time: 01/16/25  5:42 AM   Result Value Ref Range    POC Glucose, Blood 98 65 - 99 mg/dL       Fluids    Intake/Output Summary (Last 24 hours) at 1/16/2025 1035  Last data filed at 1/16/2025  1019  Gross per 24 hour   Intake 370 ml   Output 20 ml   Net 350 ml       Core Measures & Quality Metrics  Labs reviewed, Radiology images reviewed and Medications reviewed  Lopez catheter: No Lopez      DVT Prophylaxis: Enoxaparin (Lovenox)  DVT prophylaxis - mechanical: SCDs      Assessed for rehab: Patient was assess for and/or received rehabilitation services during this hospitalization    RAP Score Total: 4    CAGE Results: negative Blood Alcohol>0.08: no       Assessment/Plan  * Trauma- (present on admission)  Assessment & Plan  Ground level fall.  Non Trauma Activation.  Edward Singleton MD. Trauma Surgery    Pleural effusion on right- (present on admission)  Assessment & Plan  Noted on chest x-ray from 10/2024  Much larger on today's x-ray  CT shows very large effusion, unknown if only related to increase of previous effusion or has a hemothorax component.  1/15 Thoracentesis completed.  Residual pleural fluid collection.  1/16 IR eval and treat for IR chest tube placement.    Closed fracture of four ribs, initial encounter- (present on admission)  Assessment & Plan  Right eighth, ninth, tenth, and eleventh rib fractures; flail segment of rib nine.  Aggressive pulmonary hygiene and multimodal pain management    Adrenal insufficiency (HCC)- (present on admission)  Assessment & Plan  Cortisol level 13.5.  100 mg hydrocortisone IV, SBP improved.  1/16 50 mg hydrocortisone IV for 3 doses ordered.    Hypoglycemia- (present on admission)  Assessment & Plan  Admission serum glucose 60.   Serial glucose monitoring.   Hypoglycemia protocol.   Monitor.     Essential (primary) hypertension- (present on admission)  Assessment & Plan  Uncontrolled, SBP >200mmHg in ED.   Chronic condition treated with carvedilol and nifedipine.  Amlodipine initiated.  PRN antihypertensives.    ESRD needing dialysis (HCC)- (present on admission)  Assessment & Plan  HD MWF  Nephrology consulted from AIDEE Calvo M.D., Nephrology     No  contraindication to deep vein thrombosis (DVT) prophylaxis- (present on admission)  Assessment & Plan  Prophylactic dose heparin 5000 u q 8 hr initiated upon admission.    Coronary artery disease involving coronary bypass graft of native heart without angina pectoris- (present on admission)  Assessment & Plan  Chronic condition treated with aspirin.  Holding maintenance medication during acute traumatic illness.        Discussed patient condition with RN, Patient, and trauma surgery.

## 2025-01-16 NOTE — PROGRESS NOTES
Pt is A&O 4  Pain - patient is declining pain at this time.   - nausea  Tolerating a renal diet, will be made NPO @ midnight per order  Incision -  - Drains  - Voids; pt is anuric   + flatus  - BM; LBM 1/14 (PTA)  Up x1  SCD's on  Family at bedside  Bed alarm on, pt moderate fall risk per pam so  Reviewed plan of care with patient, bed in lowest position and locked, pt resting comfortably now, call light within reach, all needs met at this time. Interventions will be executed per plan of care

## 2025-01-16 NOTE — PROGRESS NOTES
IR RN note    Patient underwent a right chest tube placement by Dr. Love.  Procedure site was verified by MD using imaging guidance. Consent was signed.  IR jonathan Lance and Clarence assisted. Patient was placed in a sitting position.  Vitals were taken every 5 minutes and remained stable during procedure (see doc flow sheet for results).  CO2 waveform capnography was monitored throughout procedure, see chart.  Right chest access site.   A suture, Biopatch and Tegaderm dressing was placed over surgical site. Report called to Bobbi RIVERA. Pt transported by radha with RN to room, with monitor .   Reviewed sedation orders with MD    20 ml of bloody fluid sent to lab for lab culture.     MD procedure ended at 1016    Right chest tube   Flexima ADPL locking pigtail  10F X 25 cm   REF # [id number]  LOT # [id number]  Exp. 10-

## 2025-01-16 NOTE — CARE PLAN
The patient is Watcher - Medium risk of patient condition declining or worsening    Shift Goals  Clinical Goals: Hemodynamic stability, pulmonary hygiene  Patient Goals: Rest    Progress made toward(s) clinical / shift goals:  Patient medicated per MAR. Non-pharmacologic comfort measures implemented. Safety discussed. Education provided. Ambulation and repositioning encouraged. IS use encouraged. Diet intake monitored.    Problem: Pain - Standard  Goal: Alleviation of pain or a reduction in pain to the patient’s comfort goal  Description: Target End Date:  Prior to discharge or change in level of care    Document on Vitals flowsheet    1.  Document pain using the appropriate pain scale per order or unit policy  2.  Educate and implement non-pharmacologic comfort measures (i.e. relaxation, distraction, massage, cold/heat therapy, etc.)  3.  Pain management medications as ordered  4.  Reassess pain after pain med administration per policy  5.  If opiods administered assess patient's response to pain medication is appropriate per POSS sedation scale  6.  Follow pain management plan developed in collaboration with patient and interdisciplinary team (including palliative care or pain specialists if applicable)  Outcome: Progressing     Problem: Knowledge Deficit - Standard  Goal: Patient and family/care givers will demonstrate understanding of plan of care, disease process/condition, diagnostic tests and medications  Description: Target End Date:  1-3 days or as soon as patient condition allows    Document in Patient Education    1.  Patient and family/caregiver oriented to unit, equipment, visitation policy and means for communicating concern  2.  Complete/review Learning Assessment  3.  Assess knowledge level of disease process/condition, treatment plan, diagnostic tests and medications  4.  Explain disease process/condition, treatment plan, diagnostic tests and medications  Outcome: Progressing     Problem: Fall  Risk  Goal: Patient will remain free from falls  Description: Target End Date:  Prior to discharge or change in level of care    Document interventions on the Ale Roche Fall Risk Assessment    1.  Assess for fall risk factors  2.  Implement fall precautions  Outcome: Progressing       Patient is not progressing towards the following goals:

## 2025-01-16 NOTE — PROGRESS NOTES
Report received at bedside from previous shift RN   Assumed care. Pt in bed. A/O x 4. VS stable  Responds appropriately.   Pain denies, denies SOB/ denies chest pain. Provided non pharmacological interventions for comfort.  Denies N/V currently NPO sips w/meds  Patient on 1/2 L O2  Pt  Anuric, last BM 1/16 per report  Patient ambulates x1/FWW   R flank dressing to g/t  AV fistulas to L forearm  Assessment complete.   Discussed POC, pt verbalizes understanding.   Explained importance of calling before getting OOB.   Call light and belongings within reach. Bed alarm on, patient moderate fall risk per pam so.   Bed in the lowest position. Treaded socks in place. Hourly rounding in progress.

## 2025-01-16 NOTE — OR SURGEON
Immediate Post- Operative Note        Findings: Right pleural effusion      Procedure(s): Right chest tube placement      Estimated Blood Loss: Less than 5 ml        Complications: None            1/16/2025     10:23 AM     Fabrice Love M.D.

## 2025-01-16 NOTE — PROGRESS NOTES
Patient receiving 500mL NS bolus during change of shift. BP at completion of bolus 82/49. Kylie VELASCO notified ar 2001. Orders received.

## 2025-01-16 NOTE — PROGRESS NOTES
"    BP (!) 69/39   Pulse (!) 53   Temp 37 °C (98.6 °F) (Temporal)   Resp 17   Ht 1.727 m (5' 8\")   Wt 53 kg (116 lb 13.5 oz)   SpO2 98%       A/P: Contacted by nursing for onset of hypotension.  Patient seen and examined.  He is in no acute distress with no increase of work of breathing.  Heart rate remains bradycardic.    -The patient had a thoracentesis today with approximately 2000 cc out.  Stat CBC and chest x-ray to rule out worsening effusion or bleeding.  Patient was hypertensive on admit and did receive antihypertensive medications.  These medications have been held.  500 cc fluid bolus.  Trend laboratory studies and closely follow input as patient is a hemodialysis patient.  "

## 2025-01-16 NOTE — PROGRESS NOTES
4 Eyes Skin Assessment Completed by NICOLE Petty and RILEY Knapp-B.    Head WDL  Ears Redness and Blanching  Nose WDL  Mouth WDL  Neck WDL  Breast/Chest Scar  Shoulder Blades WDL,hyperpigmentation spots on shoulder blades and back  Spine WDL; hyperpigmentation spots on back  (R) Arm/Elbow/Hand WDL  (L) Arm/Elbow/Hand Scab; AV fistula x2  Abdomen Scab and Abrasion  Groin WDL  Scrotum/Coccyx/Buttocks Redness, Blanching, and Discoloration  (R) Leg WDL  (L) Leg WDL  (R) Heel/Foot/Toe WDL  (L) Heel/Foot/Toe WDL          Devices In Places Pulse Ox and Nasal Cannula      Interventions In Place NC W/Ear Foams, Sacral Mepilex, Pillows, and Low Air Loss Mattress    Possible Skin Injury No    Pictures Uploaded Into Epic N/A  Wound Consult Placed N/A  RN Wound Prevention Protocol Ordered No

## 2025-01-16 NOTE — DISCHARGE PLANNING
Case Management Discharge Planning    Admission Date: 1/14/2025  GMLOS: 4.4  ALOS: 1    6-Clicks ADL Score: 22  6-Clicks Mobility Score: 18      Anticipated Discharge Dispo: Discharge Disposition: Discharged to home/self care (01)  Discharge [facility] [place] [address]  Discharge Contact Phone Number: [phone]    DME Needed: No    Action(s) Taken: Chart review completed. Discussed patient during IDT rounds.    1400: RN CM completed dc planning assessment with patient using Ipad  services.  ID: [id number]    Escalations Completed: None    Medically Clear: No    Next Steps: Pt is requesting resources for food insecurity. RN CM will provide pt with additional resources and advance directive paperwork.     Barriers to Discharge: Medical clearance    Is the patient up for discharge tomorrow: No    Care Transition Team Assessment    Case Management role discussed with patient; Patient verbalized understanding of the Case Management role.     Demographics on facesheet verified.     Please see H&P for pertinent PMH and reason for admission.     Patient lives in [place], [place] in a one story house with his two daughters and their four children. Pt plans to use RTC services when he discharges home. Pt's PCP is Tommy TATE. [facility], he would like to use [id number] on dc. Pt reports feeling weak for the past two months which has made walking difficult. He also has stopped driving for the past two months due to feeling weak and having intermittent ringing in his ears. He does not have any DME/O2 at home. Pt goes to Dialysis at [facility], W, F at 0530. Pt denies hx of etoh/drug abuse. Pt does endorse hx of anxiety. Pt would like to start taking medication. Pt states that his primary dc support system is his son Zack Wheatley. He also says that his daughters provide some support. Pt lives off of Social Security Disability. He receives approximately $900 per month. Pt does  not have any advance directive. Pt provided RN CM with his daughter and son that he would like to have as contacts in Epic. Pt does not know their phone numbers. Contacts currently added to chart with no phone numbers.       Information Source  Orientation Level: Oriented X4  Information Given By: Patient  Informant's Name: Zack Gale  Who is responsible for making decisions for patient? : Patient    Readmission Evaluation  Is this a readmission?: No    Elopement Risk  Legal Hold: No  Ambulatory or Self Mobile in Wheelchair: Yes  Disoriented: No  Psychiatric Symptoms: None  History of Wandering: No  Elopement this Admit: No  Vocalizing Wanting to Leave: No  Displays Behaviors, Body Language Wanting to Leave: No-Not at Risk for Elopement  Elopement Risk: Not at Risk for Elopement    Interdisciplinary Discharge Planning  Primary Care Physician: Tommy TATE  Lives with - Patient's Self Care Capacity: Adult Children  Patient or legal guardian wants to designate a caregiver: No  Support Systems: Family Member(s) (Son, 2 daughters)  Housing / Facility: [address]  Do You Take your Prescribed Medications Regularly: Yes  Mobility Issues: Yes  Prior Services: Home-Independent    Discharge Preparedness  What is your plan after discharge?: Home with help  What are your discharge supports?: Child (Adult son, Adult daughters)  Prior Functional Level: Ambulatory, Independent with Activities of Daily Living, Independent with Medication Management  Difficulity with ADLs: Toileting  Difficulty with ADLs Comment: Pt states he has been weak for past two months  Difficulity with IADLs: Driving  Difficulity with IADL Comments: Pt has stopped driving with recent weakness    Functional Assesment  Prior Functional Level: Ambulatory, Independent with Activities of Daily Living, Independent with Medication Management    Finances  Financial Barriers to Discharge: Yes  Average Monthly Income: 900.6 $  Source of Income: Social  Security Disability  Prescription Coverage: Yes    Vision / Hearing Impairment  Vision Impairment : No  Hearing Impairment : No    Advance Directive  Advance Directive?: None  Advance Directive offered?: AD Booklet given    Domestic Abuse  Have you ever been the victim of abuse or violence?: No  Possible Abuse/Neglect Reported to:: Not Applicable    Psychological Assessment  History of Substance Abuse: None  History of Psychiatric Problems: Yes (anxiety)  Non-compliant with Treatment: No  Newly Diagnosed Illness: Yes    Discharge Risks or Barriers  Discharge risks or barriers?: Complex medical needs  Patient risk factors: Cognitive / sensory / physical deficit, Language barrier, Mental health, Multiple organizational systems involved    Anticipated Discharge Information  Discharge Disposition: Discharged to home/self care (01)  Discharge Address: [address] [place] [id number]  Discharge Contact Phone Number: [phone]

## 2025-01-17 NOTE — CARE PLAN
Problem: Hyperinflation  Goal: Prevent or improve atelectasis  Description: Target End Date:  3 to 4 days    1. Instruct incentive spirometry usage  2.  Perform hyperinflation therapy as indicated  Outcome: Progressing       Respiratory Update    Treatment modality: QID PEP    Pt tolerating current treatments well with no adverse reactions.

## 2025-01-17 NOTE — PROGRESS NOTES
Trauma / Surgical Daily Progress Note    Date of Service  1/17/2025    Chief Complaint  60 y.o. male admitted 1/14/2025 with rib fractures report fall in shower.      Interval Events  Zack Gale receiving care in the trauma ICU  Admitted overnight hypotensive responded well to resuscitation  Reports pain well-controlled  Chest tube in place  Slight increase in pneumothorax      Vital Signs for last 24 hours  Temp:  [36.1 °C (97 °F)-36.9 °C (98.4 °F)] 36.8 °C (98.3 °F)  Pulse:  [59-79] 66  Resp:  [12-35] 14  BP: (117-224)/() 130/59  SpO2:  [93 %-100 %] 97 %    Hemodynamic parameters for last 24 hours       Respiratory Data     Respiration: 14, Pulse Oximetry: 97 %     Work Of Breathing / Effort: Mild  RUL Breath Sounds: Clear, RML Breath Sounds: Diminished, RLL Breath Sounds: Diminished, DEVIN Breath Sounds: Clear, LLL Breath Sounds: Clear    Physical Exam  Physical Exam  Awake alert  Friendly cooperative  Normocephalic  Skin warm brisk capillary fill palpable pulse  Breathing with ease no cough no distress  Chest tube in place  Right-sided chest wall tenderness  Abdomen soft nontender no guarding no rebound  Skin appropriate color and temperature  GCS 15  Laboratory  Recent Results (from the past 24 hours)   HEMOGLOBIN AND HEMATOCRIT    Collection Time: 01/16/25  4:14 PM   Result Value Ref Range    Hemoglobin 9.1 (L) 14.0 - 18.0 g/dL    Hematocrit 28.3 (L) 42.0 - 52.0 %   PLATELET MAPPING WITH BASIC TEG    Collection Time: 01/16/25  4:20 PM   Result Value Ref Range    Reaction Time Initial-R 6.5 4.6 - 9.1 min    React Time Initial Hep 5.7 4.3 - 8.3 min    Clot Kinetics-K 1.2 0.8 - 2.1 min    Clot Angle-Angle 74.5 63.0 - 78.0 degrees    Maximum Clot Strength-MA 56.5 52.0 - 69.0 mm    TEG Functional Fibrinogen(MA) 20.2 15.0 - 32.0 mm    Lysis 30 minutes-LY30 0.8 0.0 - 2.6 %    % Inhibition ADP 9.8 0.0 - 17.0 %    % Inhibition AA 41.2 (H) 0.0 - 11.0 %    TEG Algorithm Link Algorithm    COD - Adult  (Type and Screen)    Collection Time: 01/16/25  4:20 PM   Result Value Ref Range    ABO Grouping Only O     Rh Grouping Only POS     Antibody Screen-Cod NEG    HGB    Collection Time: 01/16/25  9:40 PM   Result Value Ref Range    Hemoglobin 9.2 (L) 14.0 - 18.0 g/dL   POCT glucose device results    Collection Time: 01/16/25 11:06 PM   Result Value Ref Range    POC Glucose, Blood 103 (H) 65 - 99 mg/dL   CBC WITH DIFFERENTIAL    Collection Time: 01/17/25  5:00 AM   Result Value Ref Range    WBC 3.9 (L) 4.8 - 10.8 K/uL    RBC 3.19 (L) 4.70 - 6.10 M/uL    Hemoglobin 8.4 (L) 14.0 - 18.0 g/dL    Hematocrit 27.4 (L) 42.0 - 52.0 %    MCV 85.9 81.4 - 97.8 fL    MCH 26.3 (L) 27.0 - 33.0 pg    MCHC 30.7 (L) 32.3 - 36.5 g/dL    RDW 53.1 (H) 35.9 - 50.0 fL    Platelet Count 132 (L) 164 - 446 K/uL    MPV 10.6 9.0 - 12.9 fL    Neutrophils-Polys 83.40 (H) 44.00 - 72.00 %    Lymphocytes 12.00 (L) 22.00 - 41.00 %    Monocytes 4.30 0.00 - 13.40 %    Eosinophils 0.00 0.00 - 6.90 %    Basophils 0.00 0.00 - 1.80 %    Immature Granulocytes 0.30 0.00 - 0.90 %    Nucleated RBC 0.00 0.00 - 0.20 /100 WBC    Neutrophils (Absolute) 3.28 1.82 - 7.42 K/uL    Lymphs (Absolute) 0.47 (L) 1.00 - 4.80 K/uL    Monos (Absolute) 0.17 0.00 - 0.85 K/uL    Eos (Absolute) 0.00 0.00 - 0.51 K/uL    Baso (Absolute) 0.00 0.00 - 0.12 K/uL    Immature Granulocytes (abs) 0.01 0.00 - 0.11 K/uL    NRBC (Absolute) 0.00 K/uL   Basic Metabolic Panel    Collection Time: 01/17/25  5:00 AM   Result Value Ref Range    Sodium 137 135 - 145 mmol/L    Potassium 4.1 3.6 - 5.5 mmol/L    Chloride 94 (L) 96 - 112 mmol/L    Co2 26 20 - 33 mmol/L    Glucose 122 (H) 65 - 99 mg/dL    Bun 26 (H) 8 - 22 mg/dL    Creatinine 4.01 (H) 0.50 - 1.40 mg/dL    Calcium 8.6 8.5 - 10.5 mg/dL    Anion Gap 17.0 (H) 7.0 - 16.0   ESTIMATED GFR    Collection Time: 01/17/25  5:00 AM   Result Value Ref Range    GFR (CKD-EPI) 16 (A) >60 mL/min/1.73 m 2   POCT glucose device results    Collection Time:  01/17/25  5:01 AM   Result Value Ref Range    POC Glucose, Blood 125 (H) 65 - 99 mg/dL       Fluids    Intake/Output Summary (Last 24 hours) at 1/17/2025 1238  Last data filed at 1/17/2025 1200  Gross per 24 hour   Intake 1454.13 ml   Output 4892 ml   Net -3437.87 ml       Core Measures & Quality Metrics  Core Measures & Quality Metrics  RAP Score Total: 4    CAGE Results: negative Blood Alcohol>0.08: no       Assessment/Plan  * Trauma- (present on admission)  Assessment & Plan  Ground level fall.  Non Trauma Activation.  Edward Singleton MD. Trauma Surgery    Pleural effusion on right- (present on admission)  Assessment & Plan  Noted on chest x-ray from 10/2024  Much larger on today's x-ray  CT shows very large effusion, unknown if only related to increase of previous effusion or has a hemothorax component.  1/15 Thoracentesis completed.  Residual pleural fluid collection.  1/16 IR chest tube placement, pleurevac filled and replaced.  Monitor for signs of bleeding    Closed fracture of four ribs, initial encounter- (present on admission)  Assessment & Plan  Right eighth, ninth, tenth, and eleventh rib fractures; flail segment of rib nine.  Aggressive pulmonary hygiene and multimodal pain management  Increased pneumothorax  Continue chest tube drainage  Follow-up imaging    Adrenal insufficiency (HCC)- (present on admission)  Assessment & Plan  Cortisol level 13.5.  100 mg hydrocortisone IV, SBP improved.  1/16 50 mg hydrocortisone IV for 3 doses ordered.  Monitor response supplement as needed    Hypoglycemia- (present on admission)  Assessment & Plan  Admission serum glucose 60.   Serial glucose monitoring.   Hypoglycemia protocol.   Monitor.     Essential (primary) hypertension- (present on admission)  Assessment & Plan  Uncontrolled, SBP >200mmHg in ED.   Chronic condition treated with carvedilol and nifedipine.  Amlodipine initiated.  PRN antihypertensives.    ESRD needing dialysis (HCC)- (present on  admission)  Assessment & Plan  HD MWF  Nephrology consulted from ER  1/16 Hemodialysis  Dialysis per nephrology  Catarina Calvo M.D., Nephrology     No contraindication to deep vein thrombosis (DVT) prophylaxis- (present on admission)  Assessment & Plan  Prophylactic dose heparin 5000 u q 8 hr initiated upon admission.    Coronary artery disease involving coronary bypass graft of native heart without angina pectoris- (present on admission)  Assessment & Plan  Chronic condition treated with aspirin.  Holding maintenance medication during acute traumatic illness.        Discussed patient condition with RN, RT, Therapies, Pharmacy, Dietary, Charge nurse / hot rounds, and Patient.  CRITICAL CARE TIME EXCLUDING PROCEDURES: 45    minutes

## 2025-01-17 NOTE — PROGRESS NOTES
Nephrology Daily Progress Note    Date of Service  1/17/2025    Chief Complaint  60 y.o. male with a history of anuric ESRD on hemodialysis Monday Wednesday Friday, diabetes, hypertension admitted 1/14/2025 with ground-level fall, with rib fractures after falling in the shower, complicated by right hemothorax    Interval Problem Update  1/17 -patient had dialysis yesterday with 1.5 liters removed.  Discussed with patient dialysis again today to get him back on Monday Wednesday Friday schedule.  Patient complains of right sided chest/back pain where the thoracostomy tube is.  Denies shortness of breath.    Review of Systems  Review of Systems   Constitutional:  Negative for fever.   Respiratory:  Negative for shortness of breath.    Cardiovascular:  Positive for chest pain.   Gastrointestinal:  Negative for abdominal pain.   All other systems reviewed and are negative.       Physical Exam  Temp:  [36.1 °C (97 °F)-36.9 °C (98.4 °F)] 36.8 °C (98.3 °F)  Pulse:  [59-79] 67  Resp:  [12-35] 16  BP: (115-224)/() 115/58  SpO2:  [93 %-100 %] 96 %    Physical Exam  Constitutional:       General: He is not in acute distress.     Appearance: He is well-developed. He is not diaphoretic.   HENT:      Head: Normocephalic and atraumatic.      Mouth/Throat:      Mouth: Mucous membranes are moist.      Pharynx: Oropharynx is clear. No oropharyngeal exudate.   Eyes:      General: No scleral icterus.     Extraocular Movements: Extraocular movements intact.   Neck:      Trachea: No tracheal deviation.   Cardiovascular:      Rate and Rhythm: Normal rate.      Heart sounds: Normal heart sounds. No murmur heard.  Pulmonary:      Effort: Pulmonary effort is normal.      Breath sounds: Normal breath sounds. No stridor. No rales.      Comments: Right-sided chest tube noted  Abdominal:      General: There is no distension.      Palpations: Abdomen is soft.      Tenderness: There is no abdominal tenderness.   Musculoskeletal:          General: Normal range of motion.      Right lower leg: No edema.      Left lower leg: No edema.   Skin:     General: Skin is warm and dry.   Neurological:      General: No focal deficit present.      Mental Status: He is alert and oriented to person, place, and time.   Psychiatric:         Mood and Affect: Mood normal.         Behavior: Behavior normal.     Dialysis access: Left upper arm AV fistula with patent bruit and thrill    Fluids    Intake/Output Summary (Last 24 hours) at 1/17/2025 1458  Last data filed at 1/17/2025 1200  Gross per 24 hour   Intake 1454.13 ml   Output 4850 ml   Net -3395.87 ml       Laboratory  Labs reviewed, pertinent labs below.  Recent Labs     01/15/25  1758 01/16/25  0101 01/16/25  1216 01/16/25  1614 01/16/25  2140 01/17/25  0500   WBC 4.2* 4.4*  --   --   --  3.9*   RBC 3.22* 2.96*  --   --   --  3.19*   HEMOGLOBIN 8.4* 7.8* 9.3* 9.1* 9.2* 8.4*   HEMATOCRIT 27.7* 25.6* 30.3* 28.3*  --  27.4*   MCV 86.0 86.5  --   --   --  85.9   MCH 26.1* 26.4*  --   --   --  26.3*   MCHC 30.3* 30.5*  --   --   --  30.7*   RDW 51.8* 52.1*  --   --   --  53.1*   PLATELETCT 149* 146*  --   --   --  132*   MPV 9.6 10.0  --   --   --  10.6     Recent Labs     01/14/25  2340 01/16/25  0101 01/17/25  0500   SODIUM 137 137 137   POTASSIUM 3.8 4.3 4.1   CHLORIDE 95* 95* 94*   CO2 27 30 26   GLUCOSE 60* 106* 122*   BUN 28* 36* 26*   CREATININE 5.19* 5.98* 4.01*   CALCIUM 8.8 7.9* 8.6     Recent Labs     01/14/25  2340 01/16/25  0101   APTT 31.0 33.8   INR 1.13 1.23*           URINALYSIS:  Lab Results   Component Value Date/Time    COLORURINE Yellow 05/10/2009 0740    CLARITY Clear 05/10/2009 0740    SPECGRAVITY 1.015 05/10/2009 0740    PHURINE 6.0 05/10/2009 0740    KETONES 40 (A) 05/10/2009 0740    PROTEINURIN Negative 05/10/2009 0740    BILIRUBINUR Negative 05/10/2009 0740    NITRITE Negative 05/10/2009 0740    LEUKESTERAS Negative 05/10/2009 0740    OCCULTBLOOD Negative 05/10/2009 0740     UPC  No results  "found for: \"TOTPROTUR\" No results found for: \"CREATININEU\"      Imaging interpreted by radiologist. Imaging reports reviewed with pertinent findings below  DX-CHEST-PORTABLE (1 VIEW)   Final Result         1.  No acute cardiopulmonary disease.   2.  Right pneumothorax, appears slightly increased since prior study   3.  Cardiomegaly   4.  Atherosclerosis      DX-CHEST-PORTABLE (1 VIEW)   Final Result         1.  No acute cardiopulmonary disease.   2.  Right lung base pneumothorax, appears slightly increased since prior study   3.  Atherosclerosis      DX-CHEST-PORTABLE (1 VIEW)   Final Result      1.  Interval placement of a right-sided small bore thoracostomy tube.   2.  No significant residual right sided effusion.   3.  Small right-sided pneumothorax.      IR-CHEST TUBE-EMPYEMA RIGHT   Final Result      Successful US GUIDED right  CHEST TUBE PLACEMENT.      DX-CHEST-PORTABLE (1 VIEW)   Final Result         1.  Scattered hazy bilateral pulmonary edema and/or infiltrates, greater on the right, similar to prior study   2.  Moderate to large layering right pleural effusion, stable   3.  Atherosclerosis      DX-CHEST-LIMITED (1 VIEW)   Final Result         Moderate right pleural effusion with patchy right lung opacity, worse than prior.      US-THORACENTESIS PUNCTURE RIGHT   Final Result      1. Ultrasound guided right sided therapeutic thoracentesis.      2. 2000 mL of fluid withdrawn.      DX-CHEST-PORTABLE (1 VIEW)   Final Result      No evidence of right-sided pneumothorax status post thoracentesis. Moderate residual right-sided pleural fluid.      DX-CHEST-PORTABLE (1 VIEW)   Final Result         1.  Scattered hazy bilateral pulmonary edema and/or infiltrates are   2.  Moderate to large layering right pleural effusion   3.  Atherosclerosis      CT-CHEST (THORAX) W/O   Final Result         1.  Right posterior eighth through 11th rib fractures, with flail segment of the ninth rib.   2.  Large layering right pleural " effusion   3.  Linear consolidations in the right lung base favoring atelectasis, component of infiltrate not excluded.   4.  Atherosclerosis and atherosclerotic coronary artery disease   5.  Irregular hepatic contour favoring changes of cirrhosis      DX-PELVIS-1 OR 2 VIEWS   Final Result         1.  No acute traumatic bony injury.   2.  Atherosclerosis      [id number] (WITH 1-VIEW CXR) RIGHT   Final Result         1.  Right lateral eighth rib fracture and posterior right 10th and 11th rib fractures   2.  Segmental right ninth rib fracture, compatible with component of flail chest.   3.  Moderate to large layering right pleural effusion   4.  Scattered patchy bilateral pulmonary infiltrates   5.  Atherosclerosis            Current Facility-Administered Medications   Medication Dose Route Frequency Provider Last Rate Last Admin    NS (Bolus) 0.9 % infusion 100 mL  100 mL Intravenous DIALYSIS PRN Catarina Calvo M.D.        HYDROmorphone (Dilaudid) injection 0.5 mg  0.5 mg Intravenous Q3HRS PRN Lynne Milligan P.A.-C.        ferric gluconate complex (Ferrlecit) 250 mg in  mL IVPB  250 mg Intravenous BID Kesha Vasquez M.D.   Stopped at 01/17/25 0605    Respiratory Therapy Consult   Nebulization Continuous RT Edward Chacon M.D.        Pharmacy Consult Request ...Pain Management Review 1 Each  1 Each Other PHARMACY TO DOSE Edward Chacon M.D.        ondansetron (Zofran) syringe/vial injection 4 mg  4 mg Intravenous Q4HRS PRN Edward Chacon M.D.   4 mg at 01/15/25 1159    ondansetron (Zofran ODT) dispertab 4 mg  4 mg Oral Q4HRS PRN Edward Chacon M.D.        docusate sodium (Colace) capsule 100 mg  100 mg Oral BID Edward Chacon M.D.   100 mg at 01/15/25 0516    senna-docusate (Pericolace Or Senokot S) 8.6-50 MG per tablet 1 Tablet  1 Tablet Oral Nightly Edward Chacon M.D.        senna-docusate (Pericolace Or Senokot S) 8.6-50 MG per tablet 1 Tablet  1 Tablet Oral Q24HRS PRN Edward ESPINOSA  ELSI Chacon        polyethylene glycol/lytes (Miralax) Packet 1 Packet  1 Packet Oral BID Edward Chacon M.D.   1 Packet at 01/15/25 0516    bisacodyl (Dulcolax) suppository 10 mg  10 mg Rectal Q24HRS PRN Edward Chacon M.D.        sodium phosphate enema 1 Enema  1 Enema Rectal Once PRN Edward Chacon M.D.        [Held by provider] heparin injection 5,000 Units  5,000 Units Subcutaneous Q8HRS Edward Chacon M.D.        oxyCODONE immediate-release (Roxicodone) tablet 5 mg  5 mg Oral Q3HRS PRN Edward Chacon M.D.   5 mg at 01/17/25 1219    Or    oxyCODONE immediate release (Roxicodone) tablet 10 mg  10 mg Oral Q3HRS PRN Edward Chacon M.D.        acetaminophen (Tylenol) tablet 650 mg  650 mg Oral 4X/DAY Edward Chacon M.D.   650 mg at 01/17/25 1411    lidocaine (Asperflex) 4 % patch 1 Patch  1 Patch Transdermal Q24HR Edward Chacon M.D.   1 Patch at 01/16/25 2149    gabapentin (Neurontin) capsule 100 mg  100 mg Oral BID Edward Chacon M.D.   100 mg at 01/17/25 0510    [Held by provider] methocarbamol (Robaxin) tablet 750 mg  750 mg Oral 4X/DAY Edward Chacon M.D.   750 mg at 01/15/25 1250    hydrALAZINE (Apresoline) injection 10-20 mg  10-20 mg Intravenous Q4HRS PRN Edward Chacon M.D.   20 mg at 01/17/25 0909    labetalol (Normodyne/Trandate) injection 10-20 mg  10-20 mg Intravenous Q4HRS PRN Edward Chacon M.D.   10 mg at 01/16/25 2300    [Held by provider] carvedilol (Coreg) tablet 3.125 mg  3.125 mg Oral BID WITH MEALS Edward Chacon M.D.        [Held by provider] NIFEdipine SR (Procadia-XL) tablet 90 mg  90 mg Oral Q EVENING Edward Chacon M.D.        omeprazole (PriLOSEC) capsule 20 mg  20 mg Oral DAILY Edward Chacon M.D.   20 mg at 01/17/25 0510    [Held by provider] amLODIPine (Norvasc) tablet 10 mg  10 mg Oral Q DAY Edward Chacon M.D.        dextrose 50% (D50W) injection 25 g  25 g Intravenous Q15 MIN PRN Linsey M Wegener, A.P.R.N.      "        Assessment/Plan  60 y.o. male with a history of anuric ESRD on hemodialysis Monday Wednesday Friday, diabetes, hypertension admitted 1/14/2025 with ground-level fall, with rib fractures after falling in the shower, complicated by right hemothorax    1.  ESRD on hemodialysis Monday Wednesday Friday.  Anuric.  Plan on dialysis today per usual schedule.  Daily weights.  Check renal function panel daily.    2.  Dialysis access: Left upper arm AV fistula, patent.  Continue left arm precautions.       3.  Ground-level fall and right hemothorax, reason for admission.   Patient currently has right-sided chest tube, managed by surgery and IR.    4.  Anemia of chronic disease.  I will order Epogen 3 times weekly with dialysis.  Last dose of Mircera in outpatient clinic was given on 1/6/2025.  Check CBC daily.    5.  Hypertension.  Mildly uncontrolled.  Will continue ultrafiltration as tolerated by blood pressure, as high blood pressure usually improves with ultrafiltration with dialysis.  Recommend low-sodium diet.       6.  Hyperphosphatemia, at goal less than 5.5.  Continue phosphorus binders, sevelamer carbonate 800 mg p.o. 3 times daily with meals.  Recommend low phosphorus diet.  Check \"renal function panel\" daily (includes phosphorus level).       Wander Gerardo MD  Nephrology  [facility]          "

## 2025-01-17 NOTE — PROGRESS NOTES
Radiology Progress Note   Author: RICHIE Avery Date & Time created: 1/17/2025  9:35 AM   Date of admission  1/14/2025  Note to reader: this note follows the APSO format rather than the historical SOAP format. Assessment and plan located at the top of the note for ease of use.    Chief Complaint  60 y.o. male admitted 1/14/2025 with shortness of breath  Chief Complaint   Patient presents with    Rib Pain     Pt reports he fell in the bathtub after slipping on soap and fell onto his R side rib cage.         CHAN Gale is a 60-year-old male of ESRD, (HD) diabetes, NSTEMI s/p CABG, presented to [facility] ED on 01/15/2025 complaining of right lateral rib pain after slipping in the shower and landing on his right chest.  Imaging revealed a large right pleural effusion versus hemothorax; right 8-11 rib fractures with a flail segment of rib 9.  Thoracentesis was performed on 01/15/2025, removing 2 L of bloody fluid.  On 01/18/2025 follow-up CXR showed increase in size of effusion.  IR consulted for chest tube placement.  On 01/16/2020 25 Dr Love (IR) placed a right sided 10 Greek chest tube.    Interval History:   01/17/2025-right sided 10 Fr chest tube to - 20 cm H2O suction with 2812 mL of bloody output in the last 24 hours. Chest tube without any leaks appreciated. SpO2 % currently on room air.   I reviewed today's labs: WBC 3.9; H&H 8.4/27.4, Cr 4.01; Coags 1.23,  Micro-NTD.  I reviewed today's imaging-chest x-ray shows right pneumothorax which is slightly larger from last night.  I Discussed plan of care with Dr. Love (IR) Dr. Bran (surgery), RN and patient.      Assessment/Plan     Principal Problem:    Trauma  Active Problems:    Coronary artery disease involving coronary bypass graft of native heart without angina pectoris    ESRD needing dialysis (HCC)    Essential (primary) hypertension    Hypoglycemia    Closed fracture of four ribs, initial encounter    Pleural effusion on right    No  contraindication to deep vein thrombosis (DVT) prophylaxis    Adrenal insufficiency (HCC)      Plan IR  - Continue right  chest tube to suction -20 cm H2O.  Flush right CT drain with 10 ml of sterile saline (DO NOT ASPIRATE) if drain occluded or for thick output; however, defer saline flushes if using lytic therapy  - Recommend fu CXR and/or CT in 5-7 days or when output decreases to approx 100 mL out/24 hours  - Recommend follow up CXR as needed  - Continue to monitor drain,drain output, VS, and labs    - Chest tube removal by IR or surgery once surgery okay for removal.  - IR may not round daily     -Thank you for allowing Interventional Radiology team to participate in the patients care, if any additional care or requests are needed in the future please do not hesitate to call or place IR order           Review of Systems  Physical Exam   Review of Systems   Constitutional:  Negative for chills, fever and malaise/fatigue.   Respiratory:  Negative for cough, sputum production and shortness of breath.    Cardiovascular:  Negative for chest pain.   Gastrointestinal:  Negative for heartburn, nausea and vomiting.   Musculoskeletal:  Negative for myalgias.   Neurological:  Negative for dizziness.      Vitals:    01/17/25 0804   BP: 117/57   Pulse: (!) 59   Resp: 12   Temp:    SpO2: 93%        Physical Exam  Vitals and nursing note reviewed.   Constitutional:       General: He is awake.   HENT:      Head: Normocephalic and atraumatic.      Mouth/Throat:      Mouth: Mucous membranes are dry.   Eyes:      Pupils: Pupils are equal, round, and reactive to light.   Cardiovascular:      Rate and Rhythm: Normal rate and regular rhythm.   Pulmonary:      Effort: Pulmonary effort is normal. No respiratory distress.      Comments: Without any respiratory distress  On room air  Chest:      Comments: Right chest tube placed -20 cm H2O with bloody output noted in  Abdominal:      General: Abdomen is flat.      Palpations: Abdomen is  soft.   Skin:     General: Skin is warm and dry.      Capillary Refill: Capillary refill takes less than 2 seconds.   Neurological:      General: No focal deficit present.      Mental Status: He is alert and oriented to person, place, and time.   Psychiatric:         Behavior: Behavior is cooperative.             Labs    Recent Labs     01/15/25  1758 01/16/25  0101 01/16/25  1216 01/16/25  1614 01/16/25  2140 01/17/25  0500   WBC 4.2* 4.4*  --   --   --  3.9*   RBC 3.22* 2.96*  --   --   --  3.19*   HEMOGLOBIN 8.4* 7.8* 9.3* 9.1* 9.2* 8.4*   HEMATOCRIT 27.7* 25.6* 30.3* 28.3*  --  27.4*   MCV 86.0 86.5  --   --   --  85.9   MCH 26.1* 26.4*  --   --   --  26.3*   MCHC 30.3* 30.5*  --   --   --  30.7*   RDW 51.8* 52.1*  --   --   --  53.1*   PLATELETCT 149* 146*  --   --   --  132*   MPV 9.6 10.0  --   --   --  10.6     Recent Labs     01/14/25  2340 01/16/25 0101 01/17/25  0500   SODIUM 137 137 137   POTASSIUM 3.8 4.3 4.1   CHLORIDE 95* 95* 94*   CO2 27 30 26   GLUCOSE 60* 106* 122*   BUN 28* 36* 26*   CREATININE 5.19* 5.98* 4.01*   CALCIUM 8.8 7.9* 8.6     Recent Labs     01/14/25  2340 01/16/25  0101 01/17/25  0500   ALBUMIN 3.7  --   --    TBILIRUBIN 1.0  --   --    ALKPHOSPHAT 170*  --   --    TOTPROTEIN 7.4  --   --    ALTSGPT 6  --   --    ASTSGOT 19  --   --    CREATININE 5.19* 5.98* 4.01*     DX-CHEST-PORTABLE (1 VIEW)   Final Result         1.  No acute cardiopulmonary disease.   2.  Right pneumothorax, appears slightly increased since prior study   3.  Cardiomegaly   4.  Atherosclerosis      DX-CHEST-PORTABLE (1 VIEW)   Final Result         1.  No acute cardiopulmonary disease.   2.  Right lung base pneumothorax, appears slightly increased since prior study   3.  Atherosclerosis      DX-CHEST-PORTABLE (1 VIEW)   Final Result      1.  Interval placement of a right-sided small bore thoracostomy tube.   2.  No significant residual right sided effusion.   3.  Small right-sided pneumothorax.      IR-CHEST  TUBE-EMPYEMA RIGHT   Final Result      Successful US GUIDED right  CHEST TUBE PLACEMENT.      DX-CHEST-PORTABLE (1 VIEW)   Final Result         1.  Scattered hazy bilateral pulmonary edema and/or infiltrates, greater on the right, similar to prior study   2.  Moderate to large layering right pleural effusion, stable   3.  Atherosclerosis      DX-CHEST-LIMITED (1 VIEW)   Final Result         Moderate right pleural effusion with patchy right lung opacity, worse than prior.      US-THORACENTESIS PUNCTURE RIGHT   Final Result      1. Ultrasound guided right sided therapeutic thoracentesis.      2. 2000 mL of fluid withdrawn.      DX-CHEST-PORTABLE (1 VIEW)   Final Result      No evidence of right-sided pneumothorax status post thoracentesis. Moderate residual right-sided pleural fluid.      DX-CHEST-PORTABLE (1 VIEW)   Final Result         1.  Scattered hazy bilateral pulmonary edema and/or infiltrates are   2.  Moderate to large layering right pleural effusion   3.  Atherosclerosis      CT-CHEST (THORAX) W/O   Final Result         1.  Right posterior eighth through 11th rib fractures, with flail segment of the ninth rib.   2.  Large layering right pleural effusion   3.  Linear consolidations in the right lung base favoring atelectasis, component of infiltrate not excluded.   4.  Atherosclerosis and atherosclerotic coronary artery disease   5.  Irregular hepatic contour favoring changes of cirrhosis      DX-PELVIS-1 OR 2 VIEWS   Final Result         1.  No acute traumatic bony injury.   2.  Atherosclerosis      [id number] (WITH 1-VIEW CXR) RIGHT   Final Result         1.  Right lateral eighth rib fracture and posterior right 10th and 11th rib fractures   2.  Segmental right ninth rib fracture, compatible with component of flail chest.   3.  Moderate to large layering right pleural effusion   4.  Scattered patchy bilateral pulmonary infiltrates   5.  Atherosclerosis        INR   Date Value Ref Range Status  "  01/16/2025 1.23 (H) 0.87 - 1.13 Final     Comment:     INR - Non-therapeutic Reference Range: 0.87-1.13  INR - Therapeutic Reference Range: 2.0-4.0       No results found for: \"POCINR\"     Intake/Output Summary (Last 24 hours) at 1/17/2025 0935  Last data filed at 1/17/2025 0800  Gross per 24 hour   Intake 1264.13 ml   Output 4912 ml   Net -3647.87 ml      I have personally reviewed the above labs and imaging      I have performed a physical exam and reviewed and updated ROS and Plan today (1/17/2025).     45 minutes in directly providing and coordinating care and extensive data review.  No time overlap and excludes procedures.    "

## 2025-01-17 NOTE — CARE PLAN
The patient is Stable - Low risk of patient condition declining or worsening    Shift Goals  Clinical Goals: stable vs, hmg, and blood output from chest tube  Patient Goals: comfort/rest  Family Goals: ASH    Progress made toward(s) clinical / shift goals:    Problem: Pain - Standard  Goal: Alleviation of pain or a reduction in pain to the patient’s comfort goal  Outcome: Progressing     Problem: Knowledge Deficit - Standard  Goal: Patient and family/care givers will demonstrate understanding of plan of care, disease process/condition, diagnostic tests and medications  Outcome: Progressing     Problem: Fall Risk  Goal: Patient will remain free from falls  Outcome: Progressing       Patient is not progressing towards the following goals:

## 2025-01-17 NOTE — DIETARY
"Nutrition Services: Initial Assessment     Day 2 of admit. Zack Gale is 60 y.o., male with admitting DX of Trauma.    Consult Received for: BMI <19    Current Hospital Problems List:    Principal Problem:    Trauma (POA: Yes)  Active Problems:    Coronary artery disease involving coronary bypass graft of native heart without angina pectoris (Chronic) (POA: Yes)    ESRD needing dialysis (HCC) (POA: Yes)    Essential (primary) hypertension (Chronic) (POA: Yes)    Hypoglycemia (POA: Yes)    Closed fracture of four ribs, initial encounter (POA: Yes)    Pleural effusion on right (POA: Yes)    No contraindication to deep vein thrombosis (DVT) prophylaxis (POA: Yes)    Adrenal insufficiency (HCC) (POA: Yes)  Resolved Problems:    * No resolved hospital problems. *    Nutrition Assessment:      Height: 172.7 cm (5' 8\")  Weight: 50.6 kg (111 lb 8.8 oz)  Weight taken via: Bed Scale  BMI Calculated: 17.77  BMI Classification: Underweight     Weight Readings from Last 5 encounters:   Wt Readings from Last 5 Encounters:   01/17/25 50.6 kg (111 lb 8.8 oz)   10/10/24 53 kg (116 lb 13.5 oz)   09/01/24 51.7 kg (113 lb 15.7 oz)   03/27/24 53.8 kg (118 lb 9.7 oz)   12/07/23 57.6 kg (127 lb)     Objective:   Pertinent Labs: 1/17: K+ 4.1, Glu 122 (H), BUN 26 (H), Creat 4.01 (H). 1/16: Phos 5.1 (H), Pre-Alb 8 (L)  Pertinent Meds: Reviewed  Skin/Wounds:  No pressure injuries per chart review  Food Allergies: None known  Last BM:  Type 6: Fluffy pieces with ragged edges, a mushy stool  01/17/25       Current Diet Order/Intake:   Consistent CHO diet and Renal diet  PO intake % breakfast today on Clear liquids.   Per RN, pt ate 1/2 of chicken and one bite of muffin at lunch today.    Subjective:   Spoke with pt at bedside with assistance of NICOLE Thomas. Pt states he has lost a little weight and only eats about 1 meal per day. He does get a protein supplement from dialysis, but he can't tolerate Nepro supplements. "     Nutrition Focused Physical Exam (NFPE)  Weight Loss: Per chart review, weight has varied up and down between 50 and 53 kg over the past year. Unable to determine weight loss.   Muscle Mass: Severe Wasting at temple, quadriceps  Subcutaneous Fat: Severe Loss at orbitals  Fluid Accumulation: none noted  Reduced  Strength: N/A in acute care setting.    Nutrition Diagnosis:      Inadequate Oral Intake related to poor oral intake as evidenced by reported intake of only 1 meal per day.      Severe Malnutrition in context of Chronic Illness related to ESRD as evidenced by severe fat and muscle loss, reported poor intake estimated <75% of needs for >1 month.      RD notified provider  Dr Bran in person .     Nutrition Interventions:      Renal, Consistent CHO diet.  Encourage PO intake >50% of meals.  Patient aware of active plan of care as appropriate.       Nutrition Monitoring and Evaluation:      Monitor nutrition POC, goal for >50% intake from meals and supplements.  Additional fluids per MD/DO  Monitor vital signs pertinent to nutrition.    RD following and will provide updated recommendations as indicated.      Selina Angel R.D.                                         [facility]ITER[facility] FOR MALNUTRITION

## 2025-01-17 NOTE — PROGRESS NOTES
Patient seen and examined.  No complaints of pain, feels well.  Pleur-evac full, 2000 mL serous and fluid.    - Dose of DDAVP ordered  - Stat H&H, COD and TEG  - Stat chest x-ray  - Transfer patient to TICU for higher level of care  - Follow-up on labs

## 2025-01-17 NOTE — PROCEDURES
Nephrology/Hemodialysis note      Patient with ESRD/HD admitted post fall with right side ribs fx, hemthorax  Seen and examined during dialysis  Tolerates well  VS stable  UF 1-2   No heparin  Lad results reviewed  Please see dialysis flow sheet for details

## 2025-01-17 NOTE — CARE PLAN
Problem: Pain - Standard  Goal: Alleviation of pain or a reduction in pain to the patient’s comfort goal  Outcome: Progressing     Problem: Knowledge Deficit - Standard  Goal: Patient and family/care givers will demonstrate understanding of plan of care, disease process/condition, diagnostic tests and medications  Outcome: Progressing     The patient is Watcher - Medium risk of patient condition declining or worsening    Shift Goals  Clinical Goals: stable vital signs, stable hemoglobin, monitor blood output from chest tube  Patient Goals: rest and warm blankets  Family Goals: ASH    Progress made toward(s) clinical / shift goals:  progressing    Patient is not progressing towards the following goals: n/a

## 2025-01-17 NOTE — PROGRESS NOTES
Dr. Fairbanks rounding again at bedside, discussed total chest tube output 450 mL over 12 hr noc shift, hgb 8.4 (from 9.2), saturated chest tube dressing at 6am that was changed, and stable vital signs (SBP 130s-160s, labetolol x1 for ). MD to review am CXR results.

## 2025-01-17 NOTE — PROGRESS NOTES
Hemodialysis ordered by Dr. Calvo. Treatment started at 1339 and ended at 1639. Pt stable, vss, no c/o post tx. Net UF 1.5 L d/t increased chest tube output. Dr. Calvo notified and aware. Report to EFE Curry RN. Lt ua avf dsg cdi. See flow sheets for details in chart review media.

## 2025-01-17 NOTE — PROGRESS NOTES
4 Eyes Skin Assessment Completed by NICOLE Bob and Katarina SULLIVAN RN.    Head WDL  Ears WDL  Nose WDL  Mouth WDL  Neck WDL  Breast/Chest Incision, R. Chest pigtail chest tube.    Shoulder Blades WDL  Spine WDL  (R) Arm/Elbow/Hand WDL  (L) Arm/Elbow/Hand WDL, AV fistula   Abdomen WDL  Groin WDL  Scrotum/Coccyx/Buttocks WDL, bony prominences- mepilex in place  (R) Leg WDL  (L) Leg WDL  (R) Heel/Foot/Toe Scab  (L) Heel/Foot/Toe WDL    Devices In Places ECG, Blood Pressure Cuff, Pulse Ox, SCD's, and Nasal Cannula    Interventions In Place Gray Ear Foams, Sacral Mepilex, Heel Float Boots, TAP System, Pillows, Q2 Turns, Low Air Loss Mattress, and Heels Loaded W/Pillows    Possible Skin Injury No    Pictures Uploaded Into Epic N/A  Wound Consult Placed N/A  RN Wound Prevention Protocol Ordered No

## 2025-01-17 NOTE — CARE PLAN
The patient is Watcher - Medium risk of patient condition declining or worsening    Shift Goals  Clinical Goals: maintain or improve hemodynamic stability and respiratory status; I/O's; IS; pain control; mobility  Patient Goals: rest; pain control  Family Goals: none present at this time    Progress made toward(s) clinical / shift goals:    Problem: Pain - Standard  Goal: Alleviation of pain or a reduction in pain to the patient’s comfort goal  Outcome: Progressing     Problem: Knowledge Deficit - Standard  Goal: Patient and family/care givers will demonstrate understanding of plan of care, disease process/condition, diagnostic tests and medications  Outcome: Progressing     Problem: Fall Risk  Goal: Patient will remain free from falls  Outcome: Progressing     Problem: Skin Integrity  Goal: Skin integrity is maintained or improved  Outcome: Progressing

## 2025-01-17 NOTE — PROGRESS NOTES
Dr. Fairbanks at bedside, hemoglobin, CXR results, and chest tube output reviewed by MD. 10 mg PRN labetolol given for /101, RN to utilize prn labetolol and hydralazine overnight and continue to hold scheduled norvasc and coreg at this time per Dr. Fairbanks. Diet advanced from NPO sips w/meds to clear liquids by MD.

## 2025-01-18 PROBLEM — E46 MALNUTRITION (HCC): Status: ACTIVE | Noted: 2025-01-01

## 2025-01-18 NOTE — PROGRESS NOTES
Per son's request I called I'm to update him on plan of care. He was grateful for this and will be here later today.

## 2025-01-18 NOTE — CARE PLAN
Problem: Pain - Standard  Goal: Alleviation of pain or a reduction in pain to the patient’s comfort goal  Outcome: Progressing     Problem: Knowledge Deficit - Standard  Goal: Patient and family/care givers will demonstrate understanding of plan of care, disease process/condition, diagnostic tests and medications  Outcome: Progressing     Problem: Skin Integrity  Goal: Skin integrity is maintained or improved  Outcome: Progressing   The patient is Watcher - Medium risk of patient condition declining or worsening    Shift Goals  Clinical Goals: administer meds as ordered, watch chest tube output, pulmonary hygiene  Patient Goals: eat and sleep and take immodium to stop diarrhea  Family Goals: no family at this time    Progress made toward(s) clinical / shift goals:  met    Patient is not progressing towards the following goals:

## 2025-01-18 NOTE — PROGRESS NOTES
[facility] Progress Notes     UF net removed: 2000mL     HD treatment completed as ordered per Dr Gerardo for 3hrs. Started at 1351, ended at 1651.     Patient tolerated dialysis treatment without complications. See HD flowsheets for reference.     Post HD vital signs stable.+B/T. Access site held for 10mins. Hemostasis achieved. Dressing changed per protocol. Instructed patient to take off dressing after 3-4hrs. Report given to Florencio RIVERA.

## 2025-01-18 NOTE — PROGRESS NOTES
Nephrology Daily Progress Note    Date of Service  1/18/2025    Chief Complaint  60 y.o. male with a history of anuric ESRD on hemodialysis Monday Wednesday Friday, diabetes, hypertension admitted 1/14/2025 with ground-level fall, with rib fractures after falling in the shower, complicated by right hemothorax    Interval Problem Update  1/17 -patient had dialysis yesterday with 1.5 liters removed.  Discussed with patient dialysis again today to get him back on Monday Wednesday Friday schedule.  Patient complains of right sided chest/back pain where the thoracostomy tube is.  Denies shortness of breath.  1/18-patient had dialysis yesterday with 2 L removed.  Denies chest pain, shortness of breath.    Review of Systems  Review of Systems   Constitutional:  Negative for fever.   Respiratory:  Negative for shortness of breath.    Cardiovascular:  Negative for chest pain.   Gastrointestinal:  Negative for abdominal pain.   All other systems reviewed and are negative.       Physical Exam  Temp:  [36 °C (96.8 °F)-36.8 °C (98.2 °F)] 36.1 °C (97 °F)  Pulse:  [52-69] 52  Resp:  [9-51] 12  BP: (109-172)/(61-77) 127/64  SpO2:  [94 %-100 %] 94 %    Physical Exam  Constitutional:       General: He is not in acute distress.     Appearance: He is well-developed. He is not diaphoretic.   HENT:      Head: Normocephalic and atraumatic.      Mouth/Throat:      Mouth: Mucous membranes are moist.      Pharynx: Oropharynx is clear. No oropharyngeal exudate.   Eyes:      General: No scleral icterus.     Extraocular Movements: Extraocular movements intact.   Neck:      Trachea: No tracheal deviation.   Cardiovascular:      Rate and Rhythm: Normal rate.      Heart sounds: Normal heart sounds. No murmur heard.  Pulmonary:      Effort: Pulmonary effort is normal.      Breath sounds: Normal breath sounds. No stridor. No rales.      Comments: Right-sided chest tube noted  Abdominal:      General: There is no distension.      Palpations: Abdomen  is soft.      Tenderness: There is no abdominal tenderness.   Musculoskeletal:         General: Normal range of motion.      Right lower leg: No edema.      Left lower leg: No edema.   Skin:     General: Skin is warm and dry.   Neurological:      General: No focal deficit present.      Mental Status: He is alert and oriented to person, place, and time.   Psychiatric:         Mood and Affect: Mood normal.         Behavior: Behavior normal.     Dialysis access: Left upper arm AV fistula with patent bruit and thrill    Fluids    Intake/Output Summary (Last 24 hours) at 1/18/2025 1453  Last data filed at 1/18/2025 1200  Gross per 24 hour   Intake 740 ml   Output 3570 ml   Net -2830 ml       Laboratory  Labs reviewed, pertinent labs below.  Recent Labs     01/16/25  0101 01/16/25  1216 01/16/25  1614 01/16/25  2140 01/17/25  0500 01/18/25  0505   WBC 4.4*  --   --   --  3.9* 5.1   RBC 2.96*  --   --   --  3.19* 3.20*   HEMOGLOBIN 7.8*   < > 9.1* 9.2* 8.4* 8.5*   HEMATOCRIT 25.6*   < > 28.3*  --  27.4* 27.7*   MCV 86.5  --   --   --  85.9 86.6   MCH 26.4*  --   --   --  26.3* 26.6*   MCHC 30.5*  --   --   --  30.7* 30.7*   RDW 52.1*  --   --   --  53.1* 55.0*   PLATELETCT 146*  --   --   --  132* 150*   MPV 10.0  --   --   --  10.6 10.6    < > = values in this interval not displayed.     Recent Labs     01/16/25  0101 01/17/25  0500 01/18/25  0505   SODIUM 137 137 135   POTASSIUM 4.3 4.1 4.0   CHLORIDE 95* 94* 95*   CO2 30 26 28   GLUCOSE 106* 122* 137*   BUN 36* 26* 22   CREATININE 5.98* 4.01* 3.11*   CALCIUM 7.9* 8.6 8.0*     Recent Labs     01/16/25  0101   APTT 33.8   INR 1.23*           URINALYSIS:  Lab Results   Component Value Date/Time    COLORURINE Yellow 05/10/2009 0740    CLARITY Clear 05/10/2009 0740    SPECGRAVITY 1.015 05/10/2009 0740    PHURINE 6.0 05/10/2009 0740    KETONES 40 (A) 05/10/2009 0740    PROTEINURIN Negative 05/10/2009 0740    BILIRUBINUR Negative 05/10/2009 0740    NITRITE Negative 05/10/2009  "0740    LEUKESTERAS Negative 05/10/2009 0740    OCCULTBLOOD Negative 05/10/2009 0740     [facility] [place]  No results found for: \"TOTPROTUR\" No results found for: \"CREATININEU\"      Imaging interpreted by radiologist. Imaging reports reviewed with pertinent findings below  DX-CHEST-PORTABLE (1 VIEW)   Final Result      Interval increase in size of the right-sided pneumothorax.      DX-CHEST-PORTABLE (1 VIEW)   Final Result         1. Slight increase in 3 cm right pneumothorax. Right pleural pigtail drain remains.      2. Right rib fractures.      3. Mild infiltrates. Trace left effusion. Sternal wires.                     DX-CHEST-PORTABLE (1 VIEW)   Final Result         1.  No acute cardiopulmonary disease.   2.  Right pneumothorax, appears slightly increased since prior study   3.  Cardiomegaly   4.  Atherosclerosis      DX-CHEST-PORTABLE (1 VIEW)   Final Result         1.  No acute cardiopulmonary disease.   2.  Right lung base pneumothorax, appears slightly increased since prior study   3.  Atherosclerosis      DX-CHEST-PORTABLE (1 VIEW)   Final Result      1.  Interval placement of a right-sided small bore thoracostomy tube.   2.  No significant residual right sided effusion.   3.  Small right-sided pneumothorax.      IR-CHEST TUBE-EMPYEMA RIGHT   Final Result      Successful US GUIDED right  CHEST TUBE PLACEMENT.      DX-CHEST-PORTABLE (1 VIEW)   Final Result         1.  Scattered hazy bilateral pulmonary edema and/or infiltrates, greater on the right, similar to prior study   2.  Moderate to large layering right pleural effusion, stable   3.  Atherosclerosis      DX-CHEST-LIMITED (1 VIEW)   Final Result         Moderate right pleural effusion with patchy right lung opacity, worse than prior.      US-THORACENTESIS PUNCTURE RIGHT   Final Result      1. Ultrasound guided right sided therapeutic thoracentesis.      2. 2000 mL of fluid withdrawn.      DX-CHEST-PORTABLE (1 VIEW)   Final Result      No evidence of right-sided " pneumothorax status post thoracentesis. Moderate residual right-sided pleural fluid.      DX-CHEST-PORTABLE (1 VIEW)   Final Result         1.  Scattered hazy bilateral pulmonary edema and/or infiltrates are   2.  Moderate to large layering right pleural effusion   3.  Atherosclerosis      CT-CHEST (THORAX) W/O   Final Result         1.  Right posterior eighth through 11th rib fractures, with flail segment of the ninth rib.   2.  Large layering right pleural effusion   3.  Linear consolidations in the right lung base favoring atelectasis, component of infiltrate not excluded.   4.  Atherosclerosis and atherosclerotic coronary artery disease   5.  Irregular hepatic contour favoring changes of cirrhosis      DX-PELVIS-1 OR 2 VIEWS   Final Result         1.  No acute traumatic bony injury.   2.  Atherosclerosis      [id number] (WITH 1-VIEW CXR) RIGHT   Final Result         1.  Right lateral eighth rib fracture and posterior right 10th and 11th rib fractures   2.  Segmental right ninth rib fracture, compatible with component of flail chest.   3.  Moderate to large layering right pleural effusion   4.  Scattered patchy bilateral pulmonary infiltrates   5.  Atherosclerosis            Current Facility-Administered Medications   Medication Dose Route Frequency Provider Last Rate Last Admin    epoetin (Retacrit) injection 3,000 Units  3,000 Units Intravenous MO, WE + FR Wander Gerardo M.D.        sevelamer carbonate (Renvela) tablet 800 mg  800 mg Oral TID WITH MEALS Wander Gerardo M.D.   800 mg at 01/18/25 1156    loperamide (Imodium) capsule 2 mg  2 mg Oral 4X/DAY PRN Saida WILLIAMSON Raul, A.P.N.   2 mg at 01/18/25 0936    NS (Bolus) 0.9 % infusion 100 mL  100 mL Intravenous DIALYSIS PRN Catarina Calvo M.D.        HYDROmorphone (Dilaudid) injection 0.5 mg  0.5 mg Intravenous Q3HRS PRN Lynne Milligan P.A.-C.        Respiratory Therapy Consult   Nebulization Continuous RT Edward Chacon M.D.        Pharmacy Consult Request  ...Pain Management Review 1 Each  1 Each Other PHARMACY TO DOSE Edward Chacon M.D.        ondansetron (Zofran) syringe/vial injection 4 mg  4 mg Intravenous Q4HRS PRN Edward Chacon M.D.   4 mg at 01/15/25 1159    ondansetron (Zofran ODT) dispertab 4 mg  4 mg Oral Q4HRS PRN Edward Chacon M.D.        docusate sodium (Colace) capsule 100 mg  100 mg Oral BID Edward Chacon M.D.   100 mg at 01/15/25 0516    senna-docusate (Pericolace Or Senokot S) 8.6-50 MG per tablet 1 Tablet  1 Tablet Oral Nightly Edward Chacon M.D.        senna-docusate (Pericolace Or Senokot S) 8.6-50 MG per tablet 1 Tablet  1 Tablet Oral Q24HRS PRN Edward Chacon M.D.        polyethylene glycol/lytes (Miralax) Packet 1 Packet  1 Packet Oral BID Edward Chacon M.D.   1 Packet at 01/15/25 0516    bisacodyl (Dulcolax) suppository 10 mg  10 mg Rectal Q24HRS PRN Edward Chacon M.D.        sodium phosphate enema 1 Enema  1 Enema Rectal Once PRN Edward Chacon M.D.        [Held by provider] heparin injection 5,000 Units  5,000 Units Subcutaneous Q8HRS Edward Chacon M.D.        oxyCODONE immediate-release (Roxicodone) tablet 5 mg  5 mg Oral Q3HRS PRN Edward Chacon M.D.   5 mg at 01/17/25 1219    Or    oxyCODONE immediate release (Roxicodone) tablet 10 mg  10 mg Oral Q3HRS PRN Edward Chacon M.D.        acetaminophen (Tylenol) tablet 650 mg  650 mg Oral 4X/DAY Edward Chacon M.D.   650 mg at 01/18/25 0939    lidocaine (Asperflex) 4 % patch 1 Patch  1 Patch Transdermal Q24HR Edward Chacon M.D.   1 Patch at 01/17/25 2112    gabapentin (Neurontin) capsule 100 mg  100 mg Oral BID Edward Chacon M.D.   100 mg at 01/18/25 0513    [Held by provider] methocarbamol (Robaxin) tablet 750 mg  750 mg Oral 4X/DAY Edward Chacon M.D.   750 mg at 01/15/25 1250    hydrALAZINE (Apresoline) injection 10-20 mg  10-20 mg Intravenous Q4HRS PRN Edward Chacon M.D.   20 mg at 01/17/25 0909    labetalol (Normodyne/Trandate)  "injection 10-20 mg  10-20 mg Intravenous Q4HRS PRN Edward Chacon M.D.   10 mg at 01/16/25 2300    [Held by provider] carvedilol (Coreg) tablet 3.125 mg  3.125 mg Oral BID WITH MEALS Edward Chacon M.D.        [Held by provider] NIFEdipine SR (Procadia-XL) tablet 90 mg  90 mg Oral Q EVENING Edward Chacon M.D.        omeprazole (PriLOSEC) capsule 20 mg  20 mg Oral DAILY Edward Chacon M.D.   20 mg at 01/18/25 0513    [Held by provider] amLODIPine (Norvasc) tablet 10 mg  10 mg Oral Q DAY Edward Chacon M.D.        dextrose 50% (D50W) injection 25 g  25 g Intravenous Q15 MIN PRN Linsey M Wegener, A.P.R.N.             Assessment/Plan  60 y.o. male with a history of anuric ESRD on hemodialysis Monday Wednesday Friday, diabetes, hypertension admitted 1/14/2025 with ground-level fall, with rib fractures after falling in the shower, complicated by right hemothorax    1.  ESRD on hemodialysis Monday Wednesday Friday.  Anuric.  Next planned dialysis Monday, 1/20/2025.  Check daily weights.  Check renal function panel daily.    2.  Dialysis access: Left upper arm AV fistula, patent.  Continue left arm precautions.       3.  Ground-level fall and right hemothorax, reason for admission.   Patient remains with right-sided chest tube, managed by surgery and IR.    4.  Anemia of chronic disease.  Persistent.  I have ordered Epogen 3 times weekly with dialysis.  Last dose of Mircera in outpatient clinic was given on 1/6/2025.  Check CBC daily.    5.  Hypertension.  Labile, better controlled after dialysis.  We will continue ultrafiltration as tolerated by blood pressure, as high blood pressure usually improves with ultrafiltration with dialysis.  Recommend low-sodium diet.       6.  Hyperphosphatemia, previously at goal less than 5.5.  Continue phosphorus binders, sevelamer carbonate 800 mg p.o. 3 times daily with meals.  Recommend low phosphorus diet.  Check \"renal function panel\" daily (includes phosphorus level). "       Wander Gerardo MD  Nephrology  [facility]

## 2025-01-18 NOTE — CARE PLAN
The patient is Watcher - Medium risk of patient condition declining or worsening    Shift Goals  Clinical Goals: hemodynamic stability, moniotr chest tube output, monitor respiratory status, strict i&o  Patient Goals: rest, pain control  Family Goals: updates    Progress made toward(s) clinical / shift goals:        Problem: Pain - Standard  Goal: Alleviation of pain or a reduction in pain to the patient’s comfort goal  Description: Target End Date:  Prior to discharge or change in level of care    Document on Vitals flowsheet    1.  Document pain using the appropriate pain scale per order or unit policy  2.  Educate and implement non-pharmacologic comfort measures (i.e. relaxation, distraction, massage, cold/heat therapy, etc.)  3.  Pain management medications as ordered  4.  Reassess pain after pain med administration per policy  5.  If opiods administered assess patient's response to pain medication is appropriate per POSS sedation scale  6.  Follow pain management plan developed in collaboration with patient and interdisciplinary team (including palliative care or pain specialists if applicable)  Outcome: Progressing     Problem: Knowledge Deficit - Standard  Goal: Patient and family/care givers will demonstrate understanding of plan of care, disease process/condition, diagnostic tests and medications  Description: Target End Date:  1-3 days or as soon as patient condition allows    Document in Patient Education    1.  Patient and family/caregiver oriented to unit, equipment, visitation policy and means for communicating concern  2.  Complete/review Learning Assessment  3.  Assess knowledge level of disease process/condition, treatment plan, diagnostic tests and medications  4.  Explain disease process/condition, treatment plan, diagnostic tests and medications  Outcome: Progressing     Problem: Fall Risk  Goal: Patient will remain free from falls  Description: Target End Date:  Prior to discharge or change in  level of care    Document interventions on the [facility] Fall Risk Assessment    1.  Assess for fall risk factors  2.  Implement fall precautions  Outcome: Progressing     Problem: Skin Integrity  Goal: Skin integrity is maintained or improved  Description: Target End Date:  Prior to discharge or change in level of care    Document interventions on Skin Risk/Koko flowsheet groups and corresponding LDA    1.  Assess and monitor skin integrity, appearance and/or temperature  2.  Assess risk factors for impaired skin integrity and/or pressures ulcers  3.  Implement precautions to protect skin integrity in collaboration with interdisciplinary team  4.  Implement pressure ulcer prevention protocol if at risk for skin breakdown  5.  Confirm wound care consult if at risk for skin breakdown  6.  Ensure patient use of pressure relieving devices  (Low air loss bed, waffle overlay, heel protectors, ROHO cushion, etc)  Outcome: Progressing       Patient is not progressing towards the following goals:

## 2025-01-18 NOTE — PROGRESS NOTES
Trauma / Surgical Daily Progress Note    Date of Service  1/18/2025    Chief Complaint  60 y.o. male admitted 1/14/2025 with rib fractures report fall in shower.     Interval Events  Zack Gale receiving care in the trauma ICU  Mobilizing up to chair bedside  Reports pain well-controlled  Chest tube in place  Additional Slight increase in pneumothorax IR placed drain in place  Dressing change tube maintenance follow-up x-ray  Malnourished chronic diarrhea  Seen by nutrition  Continue supplements encourage p.o.  Follow-up GI evaluation        Vital Signs for last 24 hours  Temp:  [35.8 °C (96.4 °F)-36.8 °C (98.2 °F)] 36.1 °C (97 °F)  Pulse:  [53-69] 60  Resp:  [9-50] 18  BP: (109-172)/(61-77) 130/70  SpO2:  [95 %-100 %] 98 %    Hemodynamic parameters for last 24 hours       Respiratory Data     Respiration: 18, Pulse Oximetry: 98 %     Work Of Breathing / Effort: Shallow  RUL Breath Sounds: Clear, RML Breath Sounds: Clear, RLL Breath Sounds: Diminished, DEVIN Breath Sounds: Clear, LLL Breath Sounds: Diminished    Physical Exam  Physical Exam  Vitals and nursing note reviewed.     Awake alert  Friendly cooperative  Normocephalic  Skin warm brisk capillary fill palpable pulse  Breathing with ease no cough no distress  Chest tube in place drain 630 serosanguineous chest tube drainage 630  Right-sided chest wall tenderness  Abdomen soft nontender no guarding no rebound  Skin appropriate color and temperature  GCS 15    Laboratory  Recent Results (from the past 24 hours)   POCT glucose device results    Collection Time: 01/18/25 12:17 AM   Result Value Ref Range    POC Glucose, Blood 156 (H) 65 - 99 mg/dL   CBC WITH DIFFERENTIAL    Collection Time: 01/18/25  5:05 AM   Result Value Ref Range    WBC 5.1 4.8 - 10.8 K/uL    RBC 3.20 (L) 4.70 - 6.10 M/uL    Hemoglobin 8.5 (L) 14.0 - 18.0 g/dL    Hematocrit 27.7 (L) 42.0 - 52.0 %    MCV 86.6 81.4 - 97.8 fL    MCH 26.6 (L) 27.0 - 33.0 pg    MCHC 30.7 (L) 32.3 -  36.5 g/dL    RDW 55.0 (H) 35.9 - 50.0 fL    Platelet Count 150 (L) 164 - 446 K/uL    MPV 10.6 9.0 - 12.9 fL    Neutrophils-Polys 76.00 (H) 44.00 - 72.00 %    Lymphocytes 18.10 (L) 22.00 - 41.00 %    Monocytes 5.30 0.00 - 13.40 %    Eosinophils 0.20 0.00 - 6.90 %    Basophils 0.20 0.00 - 1.80 %    Immature Granulocytes 0.20 0.00 - 0.90 %    Nucleated RBC 0.00 0.00 - 0.20 /100 WBC    Neutrophils (Absolute) 3.85 1.82 - 7.42 K/uL    Lymphs (Absolute) 0.92 (L) 1.00 - 4.80 K/uL    Monos (Absolute) 0.27 0.00 - 0.85 K/uL    Eos (Absolute) 0.01 0.00 - 0.51 K/uL    Baso (Absolute) 0.01 0.00 - 0.12 K/uL    Immature Granulocytes (abs) 0.01 0.00 - 0.11 K/uL    NRBC (Absolute) 0.00 K/uL   Basic Metabolic Panel    Collection Time: 01/18/25  5:05 AM   Result Value Ref Range    Sodium 135 135 - 145 mmol/L    Potassium 4.0 3.6 - 5.5 mmol/L    Chloride 95 (L) 96 - 112 mmol/L    Co2 28 20 - 33 mmol/L    Glucose 137 (H) 65 - 99 mg/dL    Bun 22 8 - 22 mg/dL    Creatinine 3.11 (H) 0.50 - 1.40 mg/dL    Calcium 8.0 (L) 8.5 - 10.5 mg/dL    Anion Gap 12.0 7.0 - 16.0   ESTIMATED GFR    Collection Time: 01/18/25  5:05 AM   Result Value Ref Range    GFR (CKD-EPI) 22 (A) >60 mL/min/1.73 m 2       Fluids    Intake/Output Summary (Last 24 hours) at 1/18/2025 1201  Last data filed at 1/18/2025 0800  Gross per 24 hour   Intake 620 ml   Output 3220 ml   Net -2600 ml       Core Measures & Quality Metrics  Core Measures & Quality Metrics  RAP Score Total: 4    CAGE Results: negative Blood Alcohol>0.08: no       Assessment/Plan  * Trauma- (present on admission)  Assessment & Plan  Ground level fall.  Non Trauma Activation.  Edward Singleton MD. Trauma Surgery    Pleural effusion on right- (present on admission)  Assessment & Plan  Noted on chest x-ray from 10/2024  Much larger on today's x-ray  CT shows very large effusion, unknown if only related to increase of previous effusion or has a hemothorax component.  1/15 Thoracentesis completed.  Residual  pleural fluid collection.  1/16 IR chest tube placement, pleurevac filled and replaced.  Ongoing chest tube drainage  Monitor for signs of bleeding    Closed fracture of four ribs, initial encounter- (present on admission)  Assessment & Plan  Right eighth, ninth, tenth, and eleventh rib fractures; flail segment of rib nine.  Aggressive pulmonary hygiene and multimodal pain management   chest tube drainage 630  Continue chest tube drainage  Follow-up imaging    Adrenal insufficiency (HCC)- (present on admission)  Assessment & Plan  Cortisol level 13.5.  100 mg hydrocortisone IV, SBP improved.  1/16 50 mg hydrocortisone IV for 3 doses ordered.  Monitor response supplement as needed    Hypoglycemia- (present on admission)  Assessment & Plan  Admission serum glucose 60.   Serial glucose monitoring.   Hypoglycemia protocol.   Monitor.     Essential (primary) hypertension- (present on admission)  Assessment & Plan  Uncontrolled, SBP >200mmHg in ED.   Chronic condition treated with carvedilol and nifedipine.  Amlodipine initiated.  PRN antihypertensives.    ESRD needing dialysis (HCC)- (present on admission)  Assessment & Plan  HD MWF  Nephrology consulted from ER  1/16 Hemodialysis  Dialysis per nephrology  Catarina Calvo M.D., Nephrology     Malnutrition ([facility])  Assessment & Plan  Nutritional therapies  Follow-up GI evaluation recommendations    No contraindication to deep vein thrombosis (DVT) prophylaxis- (present on admission)  Assessment & Plan  Prophylactic dose heparin 5000 u q 8 hr initiated upon admission.    Coronary artery disease involving coronary bypass graft of native heart without angina pectoris- (present on admission)  Assessment & Plan  Chronic condition treated with aspirin.  Holding maintenance medication during acute traumatic illness.        Discussed patient condition with RN, RT, Therapies, Pharmacy, Dietary, Charge nurse / hot rounds, and Patient.  CRITICAL CARE TIME EXCLUDING PROCEDURES: 34     minutes

## 2025-01-19 NOTE — PROGRESS NOTES
Right sided 10 Yi chest tube placed by Dr. Love on 01/16/2020 has been removed by surgical team.  A chest tube has been placed by surgery.    -IR signing off

## 2025-01-19 NOTE — CARE PLAN
The patient is Watcher - Medium risk of patient condition declining or worsening    Problem: Pain - Standard  Goal: Alleviation of pain or a reduction in pain to the patient’s comfort goal  Outcome: Progressing     Problem: Fall Risk  Goal: Patient will remain free from falls  Outcome: Progressing     Problem: Skin Integrity  Goal: Skin integrity is maintained or improved  Outcome: Progressing     Shift Goals  Clinical Goals: monitor chest tube output, pulmonary hygine, imporive IS volume  Patient Goals: rest  Family Goals: unable to assess- no family present    Progress made toward(s) clinical / shift goals:  met      Patient is not progressing towards the following goals:

## 2025-01-19 NOTE — PROGRESS NOTES
Radiology Progress Note   Author: RICHIE Avery Date & Time created: 1/18/2025  1330   Date of admission  1/14/2025  Note to reader: this note follows the APSO format rather than the historical SOAP format. Assessment and plan located at the top of the note for ease of use.    Chief Complaint  60 y.o. male admitted 1/14/2025 with shortness of breath  Chief Complaint   Patient presents with    Rib Pain     Pt reports he fell in the bathtub after slipping on soap and fell onto his R side rib cage.         CAHN Gale is a 60-year-old male of ESRD, (HD) diabetes, NSTEMI s/p CABG, presented to [facility] ED on 01/15/2025 complaining of right lateral rib pain after slipping in the shower and landing on his right chest.  Imaging revealed a large right pleural effusion versus hemothorax; right 8-11 rib fractures with a flail segment of rib 9.  Thoracentesis was performed on 01/15/2025, removing 2 L of bloody fluid.  On 01/18/2025 follow-up CXR showed increase in size of effusion.  IR consulted for chest tube placement.  On 01/16/2020 25 Dr Love (IR) placed a right sided 10 [place] chest tube.    Interval History:   01/17/2025-right sided 10 Fr chest tube to - 20 cm H2O suction with 2812 mL of bloody output in the last 24 hours. Chest tube without any leaks appreciated. SpO2 % currently on room air.   I reviewed today's labs: WBC 3.9; H&H 8.4/27.4, Cr 4.01; Coags 1.23,  Micro-NTD.  I reviewed today's imaging-chest x-ray shows right pneumothorax which is slightly larger from last night.  I Discussed plan of care with Dr. Love (IR) Dr. Bran (surgery), RN and patient.    01/18/2025-Repeat imaging today shows increased right pneumothorax-slightly larger from last night.  Patient remains asymptomatic ; without shortness of breath, chest pain and is on room air with saturations in the 90 percentile.  Right sided 10 Fr chest tube suction increased today to - 40 cm H2O suction with 440 mL of serosanguineous output  in the last 24 hours. Chest tube patent without any leaks appreciated. SpO2 % currently on room air.   I reviewed today's labs: WBC 3.20; H&H 8.5/27.7, Cr 3.11; Coags INR 1.23 tag with increased platelet inhibition AA-41.2,  Micro pleural fluid-NTD.  I reviewed today's imaging-chest x-ray shows right pneumothorax which is slightly larger from last night.  I Discussed plan of care with Dr. Love (IR) Dr. Bran (surgery), RN and patient.    Assessment/Plan     Principal Problem:    Trauma  Active Problems:    Coronary artery disease involving coronary bypass graft of native heart without angina pectoris    ESRD needing dialysis (HCC)    Essential (primary) hypertension    Hypoglycemia    Closed fracture of four ribs, initial encounter    Pleural effusion on right    No contraindication to deep vein thrombosis (DVT) prophylaxis    Adrenal insufficiency (HCC)    Malnutrition (HCC)      Plan IR  -I had discussion with surgery today regarding replacing chest tube or placing new chest tube if patient becomes symptomatic and/or if surgery recommending for resolution of pneumo.  At this time we will follow surgery's recommendation of increased suction to -40 cm H2O and continue with close monitoring.  If patient requires chest tube emergently then IR recommends chest tube be placed by in house surgical team  - Continue right  chest tube to suction -40 cm H2O.  Flush right CT drain with 10 ml of sterile saline (DO NOT ASPIRATE) if drain occluded or for thick output; however, defer saline flushes if using lytic therapy  - Recommend fu CXR and/or CT in 5-7 days or when output decreases to approx 100 mL out/24 hours  - Recommend follow up CXR as needed  - Continue to monitor drain,drain output, VS, and labs    - Chest tube removal by IR or surgery once surgery okay for removal.  - IR may not round daily     -Thank you for allowing Interventional Radiology team to participate in the patients care, if any additional care or  requests are needed in the future please do not hesitate to call or place IR order           Review of Systems  Physical Exam   Review of Systems   Constitutional:  Negative for chills, fever and malaise/fatigue.   Respiratory:  Negative for cough, sputum production and shortness of breath.    Cardiovascular:  Negative for chest pain.   Gastrointestinal:  Negative for heartburn, nausea and vomiting.   Genitourinary:  Negative for dysuria.   Musculoskeletal:  Negative for myalgias.   Neurological:  Negative for dizziness and weakness.      Vitals:    01/18/25 1400   BP: 127/64   Pulse: (!) 52   Resp: 12   Temp:    SpO2:         Physical Exam  Vitals and nursing note reviewed.   Constitutional:       General: He is awake.   HENT:      Head: Normocephalic and atraumatic.      Mouth/Throat:      Mouth: Mucous membranes are dry.   Eyes:      Pupils: Pupils are equal, round, and reactive to light.   Cardiovascular:      Rate and Rhythm: Normal rate and regular rhythm.   Pulmonary:      Effort: Pulmonary effort is normal. No respiratory distress.      Comments: Without any respiratory distress  On room air  Chest:      Comments: Right chest tube placed -40 cm H2O with bloody output noted in chest tube collection chamber  Abdominal:      General: Abdomen is flat.      Palpations: Abdomen is soft.   Skin:     General: Skin is warm and dry.      Capillary Refill: Capillary refill takes less than 2 seconds.   Neurological:      General: No focal deficit present.      Mental Status: He is alert and oriented to person, place, and time.      Comments: Sleepy however wakes easily to voice   Psychiatric:         Behavior: Behavior is cooperative.      Comments: Sleepy however wakes easily to voice             Labs    Recent Labs     01/16/25  0101 01/16/25  1216 01/16/25  1614 01/16/25  2140 01/17/25  0500 01/18/25  0505   WBC 4.4*  --   --   --  3.9* 5.1   RBC 2.96*  --   --   --  3.19* 3.20*   HEMOGLOBIN 7.8*   < > 9.1* 9.2* 8.4*  8.5*   HEMATOCRIT 25.6*   < > 28.3*  --  27.4* 27.7*   MCV 86.5  --   --   --  85.9 86.6   MCH 26.4*  --   --   --  26.3* 26.6*   MCHC 30.5*  --   --   --  30.7* 30.7*   RDW 52.1*  --   --   --  53.1* 55.0*   PLATELETCT 146*  --   --   --  132* 150*   MPV 10.0  --   --   --  10.6 10.6    < > = values in this interval not displayed.     Recent Labs     01/16/25  0101 01/17/25  0500 01/18/25  0505   SODIUM 137 137 135   POTASSIUM 4.3 4.1 4.0   CHLORIDE 95* 94* 95*   CO2 30 26 28   GLUCOSE 106* 122* 137*   BUN 36* 26* 22   CREATININE 5.98* 4.01* 3.11*   CALCIUM 7.9* 8.6 8.0*     Recent Labs     01/16/25  0101 01/17/25  0500 01/18/25  0505   CREATININE 5.98* 4.01* 3.11*     DX-CHEST-PORTABLE (1 VIEW)   Final Result      Interval increase in size of the right-sided pneumothorax.      DX-CHEST-PORTABLE (1 VIEW)   Final Result         1. Slight increase in 3 cm right pneumothorax. Right pleural pigtail drain remains.      2. Right rib fractures.      3. Mild infiltrates. Trace left effusion. Sternal wires.                     DX-CHEST-PORTABLE (1 VIEW)   Final Result         1.  No acute cardiopulmonary disease.   2.  Right pneumothorax, appears slightly increased since prior study   3.  Cardiomegaly   4.  Atherosclerosis      DX-CHEST-PORTABLE (1 VIEW)   Final Result         1.  No acute cardiopulmonary disease.   2.  Right lung base pneumothorax, appears slightly increased since prior study   3.  Atherosclerosis      DX-CHEST-PORTABLE (1 VIEW)   Final Result      1.  Interval placement of a right-sided small bore thoracostomy tube.   2.  No significant residual right sided effusion.   3.  Small right-sided pneumothorax.      IR-CHEST TUBE-EMPYEMA RIGHT   Final Result      Successful US GUIDED right  CHEST TUBE PLACEMENT.      DX-CHEST-PORTABLE (1 VIEW)   Final Result         1.  Scattered hazy bilateral pulmonary edema and/or infiltrates, greater on the right, similar to prior study   2.  Moderate to large layering right  "pleural effusion, stable   3.  Atherosclerosis      DX-CHEST-LIMITED (1 VIEW)   Final Result         Moderate right pleural effusion with patchy right lung opacity, worse than prior.      US-THORACENTESIS PUNCTURE RIGHT   Final Result      1. Ultrasound guided right sided therapeutic thoracentesis.      2. 2000 mL of fluid withdrawn.      DX-CHEST-PORTABLE (1 VIEW)   Final Result      No evidence of right-sided pneumothorax status post thoracentesis. Moderate residual right-sided pleural fluid.      DX-CHEST-PORTABLE (1 VIEW)   Final Result         1.  Scattered hazy bilateral pulmonary edema and/or infiltrates are   2.  Moderate to large layering right pleural effusion   3.  Atherosclerosis      CT-CHEST (THORAX) W/O   Final Result         1.  Right posterior eighth through 11th rib fractures, with flail segment of the ninth rib.   2.  Large layering right pleural effusion   3.  Linear consolidations in the right lung base favoring atelectasis, component of infiltrate not excluded.   4.  Atherosclerosis and atherosclerotic coronary artery disease   5.  Irregular hepatic contour favoring changes of cirrhosis      DX-PELVIS-1 OR 2 VIEWS   Final Result         1.  No acute traumatic bony injury.   2.  Atherosclerosis      [id number] (WITH 1-VIEW CXR) RIGHT   Final Result         1.  Right lateral eighth rib fracture and posterior right 10th and 11th rib fractures   2.  Segmental right ninth rib fracture, compatible with component of flail chest.   3.  Moderate to large layering right pleural effusion   4.  Scattered patchy bilateral pulmonary infiltrates   5.  Atherosclerosis        INR   Date Value Ref Range Status   01/16/2025 1.23 (H) 0.87 - 1.13 Final     Comment:     INR - Non-therapeutic Reference Range: 0.87-1.13  INR - Therapeutic Reference Range: 2.0-4.0       No results found for: \"POCINR\"     Intake/Output Summary (Last 24 hours) at 1/17/2025 0924  Last data filed at 1/17/2025 0800  Gross per 24 " hour   Intake 1264.13 ml   Output 4912 ml   Net -3647.87 ml      I have personally reviewed the above labs and imaging      I have performed a physical exam and reviewed and updated ROS and Plan today (1/18/2025).     48 minutes in directly providing and coordinating care and extensive data review.  No time overlap and excludes procedures.

## 2025-01-19 NOTE — PROGRESS NOTES
Trauma / Surgical Daily Progress Note    Date of Service  1/19/2025    Chief Complaint  60 y.o. male admitted 1/14/2025 with rib fractures report fall in shower.     Interval Events  Zack Gale receiving care in the trauma ICU  Mobilizing up to chair bedside  Reports pain well-controlled  Chest tube in place  Additional Slight increase in pneumothorax despite adjustment.  No chest tube placed with resolution of pneumothorax.  Needed a little bit better  Malnourished chronic diarrhea  Seen by nutrition  Continue supplements encourage p.o.  Follow-up GI evaluation           Vital Signs for last 24 hours  Temp:  [35.6 °C (96 °F)-37.2 °C (99 °F)] 37.1 °C (98.8 °F)  Pulse:  [55-84] 65  Resp:  [9-61] 13  BP: (124-187)/(61-86) 148/73  SpO2:  [95 %-100 %] 100 %    Hemodynamic parameters for last 24 hours       Respiratory Data     Respiration: 13, Pulse Oximetry: 100 %     Work Of Breathing / Effort: Shallow  RUL Breath Sounds: Diminished, RML Breath Sounds: Diminished, RLL Breath Sounds: Diminished, DEVIN Breath Sounds: Diminished, LLL Breath Sounds: Diminished    Physical Exam  Physical Exam  Friendly cooperative  Normocephalic  Skin warm brisk capillary fill palpable pulse  Breathing with ease no cough no distress  Chest tube in place with right-sided chest wall tenderness  Abdomen soft nontender no guarding no rebound  Skin appropriate color and temperature  GCS 15  Laboratory  Recent Results (from the past 24 hours)   POCT glucose device results    Collection Time: 01/19/25 12:03 AM   Result Value Ref Range    POC Glucose, Blood 101 (H) 65 - 99 mg/dL   CBC WITH DIFFERENTIAL    Collection Time: 01/19/25  3:57 AM   Result Value Ref Range    WBC 6.0 4.8 - 10.8 K/uL    RBC 3.24 (L) 4.70 - 6.10 M/uL    Hemoglobin 8.7 (L) 14.0 - 18.0 g/dL    Hematocrit 27.8 (L) 42.0 - 52.0 %    MCV 85.8 81.4 - 97.8 fL    MCH 26.9 (L) 27.0 - 33.0 pg    MCHC 31.3 (L) 32.3 - 36.5 g/dL    RDW 54.4 (H) 35.9 - 50.0 fL    Platelet  Count 136 (L) 164 - 446 K/uL    MPV 9.6 9.0 - 12.9 fL    Neutrophils-Polys 74.60 (H) 44.00 - 72.00 %    Lymphocytes 16.90 (L) 22.00 - 41.00 %    Monocytes 5.50 0.00 - 13.40 %    Eosinophils 2.20 0.00 - 6.90 %    Basophils 0.30 0.00 - 1.80 %    Immature Granulocytes 0.50 0.00 - 0.90 %    Nucleated RBC 0.00 0.00 - 0.20 /100 WBC    Neutrophils (Absolute) 4.44 1.82 - 7.42 K/uL    Lymphs (Absolute) 1.01 1.00 - 4.80 K/uL    Monos (Absolute) 0.33 0.00 - 0.85 K/uL    Eos (Absolute) 0.13 0.00 - 0.51 K/uL    Baso (Absolute) 0.02 0.00 - 0.12 K/uL    Immature Granulocytes (abs) 0.03 0.00 - 0.11 K/uL    NRBC (Absolute) 0.00 K/uL   Basic Metabolic Panel    Collection Time: 01/19/25  3:57 AM   Result Value Ref Range    Sodium 136 135 - 145 mmol/L    Potassium 4.0 3.6 - 5.5 mmol/L    Chloride 97 96 - 112 mmol/L    Co2 25 20 - 33 mmol/L    Glucose 82 65 - 99 mg/dL    Bun 30 (H) 8 - 22 mg/dL    Creatinine 4.24 (HH) 0.50 - 1.40 mg/dL    Calcium 8.1 (L) 8.5 - 10.5 mg/dL    Anion Gap 14.0 7.0 - 16.0   ESTIMATED GFR    Collection Time: 01/19/25  3:57 AM   Result Value Ref Range    GFR (CKD-EPI) 15 (A) >60 mL/min/1.73 m 2   POCT glucose device results    Collection Time: 01/19/25  6:26 AM   Result Value Ref Range    POC Glucose, Blood 75 65 - 99 mg/dL       Fluids    Intake/Output Summary (Last 24 hours) at 1/19/2025 1541  Last data filed at 1/19/2025 0600  Gross per 24 hour   Intake 150 ml   Output 332 ml   Net -182 ml       Core Measures & Quality Metrics  Core Measures & Quality Metrics  RAP Score Total: 4    CAGE Results: negative Blood Alcohol>0.08: no       Assessment/Plan  * Trauma- (present on admission)  Assessment & Plan  Ground level fall.  Non Trauma Activation.  Edward Singleton MD. Trauma Surgery    Pleural effusion on right- (present on admission)  Assessment & Plan  Noted on chest x-ray from 10/2024  Much larger on today's x-ray  CT shows very large effusion, unknown if only related to increase of previous effusion or has  a hemothorax component.  1/15 Thoracentesis completed.  Residual pleural fluid collection.  1/16 IR chest tube placement, pleurevac filled and replaced.  1/19 IR drain replaced chest tube for persistent increasing pneumothorax  Ongoing chest tube drainage  Monitor for signs of bleeding    Closed fracture of four ribs, initial encounter- (present on admission)  Assessment & Plan  Right eighth, ninth, tenth, and eleventh rib fractures; flail segment of rib nine.  Aggressive pulmonary hygiene and multimodal pain management   chest tube drainage 630  Continue chest tube drainage  Follow-up imaging    Adrenal insufficiency (HCC)- (present on admission)  Assessment & Plan  Cortisol level 13.5.  100 mg hydrocortisone IV, SBP improved.  1/16 50 mg hydrocortisone IV for 3 doses ordered.  Monitor response supplement as needed    Hypoglycemia- (present on admission)  Assessment & Plan  Admission serum glucose 60.   Serial glucose monitoring.   Hypoglycemia protocol.   Monitor.     Essential (primary) hypertension- (present on admission)  Assessment & Plan  Uncontrolled, SBP >200mmHg in ED.   Chronic condition treated with carvedilol and nifedipine.  Amlodipine initiated.  PRN antihypertensives.    ESRD needing dialysis (HCC)- (present on admission)  Assessment & Plan  HD MWF  Nephrology consulted from ER  1/16 Hemodialysis  Dialysis per nephrology  Catarina Calvo M.D., Nephrology     Malnutrition ([facility])  Assessment & Plan  Nutritional therapies  Follow-up GI evaluation recommendations    No contraindication to deep vein thrombosis (DVT) prophylaxis- (present on admission)  Assessment & Plan  Prophylactic dose heparin 5000 u q 8 hr initiated upon admission.    Coronary artery disease involving coronary bypass graft of native heart without angina pectoris- (present on admission)  Assessment & Plan  Chronic condition treated with aspirin.  Holding maintenance medication during acute traumatic illness.        Discussed patient  condition with Family, RN, RT, Therapies, Pharmacy, Dietary, Charge nurse / hot rounds, and Patient.  CRITICAL CARE TIME EXCLUDING PROCEDURES: 34    minutes

## 2025-01-20 PROBLEM — J18.9 PNEUMONIA OF BOTH LUNGS DUE TO INFECTIOUS ORGANISM: Status: ACTIVE | Noted: 2025-01-01

## 2025-01-20 PROBLEM — A41.9 SEPTIC SHOCK DUE TO UNDETERMINED ORGANISM (HCC): Status: ACTIVE | Noted: 2025-01-01

## 2025-01-20 PROBLEM — R65.21 SEPTIC SHOCK DUE TO UNDETERMINED ORGANISM (HCC): Status: ACTIVE | Noted: 2025-01-01

## 2025-01-20 NOTE — PROGRESS NOTES
[facility] Services Progress Note     0800: Pt is drowsy and hypotensive with SBP of 70's to 80's and MAP >60 mmHg. Primary RN started albumin drip. AVF has no bruit and thrill (double verified by another HD RN), tried to cannulate arterial site but with no backflow. Dr. Najjar was informed. Machine and supplies were pulled out from the patient's room.    Report given to TRUDY Jimenes RN

## 2025-01-20 NOTE — PROGRESS NOTES
Hypoglycemia Intervention    Hypoglycemia protocol intervention:  Blood glucose 55 at 2358.  Intervention: 25 g IV dextrose per MAR   Repeat blood glucose 158 at 0030. (15min after the administration of D50)  Intervention: Patient NPO, recheck blood glucose in 1 hour   Additional interventions needed: Will reassess blood glucose in 1 hour

## 2025-01-20 NOTE — CARE PLAN
The patient is Watcher - Medium risk of patient condition declining or worsening    Shift Goals  Clinical Goals: bp and glucose control  Patient Goals: rest  Family Goals: ariadne    Progress made toward(s) clinical / shift goals:    Problem: Pain - Standard  Goal: Alleviation of pain or a reduction in pain to the patient’s comfort goal  Outcome: Progressing       Patient is not progressing towards the following goals:

## 2025-01-20 NOTE — PROGRESS NOTES
Dr. Bran at bedside to place trialysis catheter. Consent was obtained over the phone by pt's son.     1246 wire out    1252 procedure done. Pt tolerated well.     Notified dialysis RN.

## 2025-01-20 NOTE — PROGRESS NOTES
Dr. Bowles notified about patient's respiratory status. Dr. Bowles at beside to assess patient. Orders received

## 2025-01-20 NOTE — PROGRESS NOTES
Nephrology Daily Progress Note    Date of Service  1/19/2025    Chief Complaint  60 y.o. male with a history of anuric ESRD on hemodialysis Monday Wednesday Friday, diabetes, hypertension admitted 1/14/2025 with ground-level fall, with rib fractures after falling in the shower, complicated by right hemothorax    Interval Problem Update  1/17 -patient had dialysis yesterday with 1.5 liters removed.  Discussed with patient dialysis again today to get him back on Monday Wednesday Friday schedule.  Patient complains of right sided chest/back pain where the thoracostomy tube is.  Denies shortness of breath.  1/18-patient had dialysis yesterday with 2 L removed.  Denies chest pain, shortness of breath.  1/19 -patient had a larger chest tube today.  Complains of some soreness on his right chest.  Denies shortness of breath.    Review of Systems  Review of Systems   Constitutional:  Negative for fever.   Respiratory:  Negative for shortness of breath.    Cardiovascular:  Positive for chest pain.   Gastrointestinal:  Negative for abdominal pain.   All other systems reviewed and are negative.       Physical Exam  Temp:  [35.9 °C (96.7 °F)-37.2 °C (99 °F)] 37.2 °C (98.9 °F)  Pulse:  [] 124  Resp:  [9-68] 68  BP: (113-195)/(57-86) 113/64  SpO2:  [88 %-100 %] 94 %    Physical Exam  Constitutional:       General: He is not in acute distress.     Appearance: He is well-developed. He is not diaphoretic.   HENT:      Head: Normocephalic and atraumatic.      Mouth/Throat:      Mouth: Mucous membranes are moist.      Pharynx: Oropharynx is clear. No oropharyngeal exudate.   Eyes:      General: No scleral icterus.     Extraocular Movements: Extraocular movements intact.   Neck:      Trachea: No tracheal deviation.   Cardiovascular:      Rate and Rhythm: Normal rate.      Heart sounds: Normal heart sounds. No murmur heard.  Pulmonary:      Effort: Pulmonary effort is normal.      Breath sounds: Normal breath sounds. No stridor. No  "rales.   Abdominal:      General: There is no distension.      Palpations: Abdomen is soft.      Tenderness: There is no abdominal tenderness.   Musculoskeletal:         General: Normal range of motion.      Right lower leg: No edema.      Left lower leg: No edema.   Skin:     General: Skin is warm and dry.   Neurological:      General: No focal deficit present.      Mental Status: He is alert and oriented to person, place, and time.   Psychiatric:         Mood and Affect: Mood normal.         Behavior: Behavior normal.     Dialysis access: Left upper arm AV fistula with patent bruit and thrill    Fluids    Intake/Output Summary (Last 24 hours) at 1/19/2025 1735  Last data filed at 1/19/2025 1700  Gross per 24 hour   Intake 530 ml   Output 283 ml   Net 247 ml       Laboratory  Labs reviewed, pertinent labs below.  Recent Labs     01/17/25  0500 01/18/25  0505 01/19/25  0357   WBC 3.9* 5.1 6.0   RBC 3.19* 3.20* 3.24*   HEMOGLOBIN 8.4* 8.5* 8.7*   HEMATOCRIT 27.4* 27.7* 27.8*   MCV 85.9 86.6 85.8   MCH 26.3* 26.6* 26.9*   MCHC 30.7* 30.7* 31.3*   RDW 53.1* 55.0* 54.4*   PLATELETCT 132* 150* 136*   MPV 10.6 10.6 9.6     Recent Labs     01/17/25  0500 01/18/25  0505 01/19/25  0357   SODIUM 137 135 136   POTASSIUM 4.1 4.0 4.0   CHLORIDE 94* 95* 97   CO2 26 28 25   GLUCOSE 122* 137* 82   BUN 26* 22 30*   CREATININE 4.01* 3.11* 4.24*   CALCIUM 8.6 8.0* 8.1*                 URINALYSIS:  Lab Results   Component Value Date/Time    COLORURINE Yellow 05/10/2009 0740    CLARITY Clear 05/10/2009 0740    SPECGRAVITY 1.015 05/10/2009 0740    PHURINE 6.0 05/10/2009 0740    KETONES 40 (A) 05/10/2009 0740    PROTEINURIN Negative 05/10/2009 0740    BILIRUBINUR Negative 05/10/2009 0740    NITRITE Negative 05/10/2009 0740    LEUKESTERAS Negative 05/10/2009 0740    OCCULTBLOOD Negative 05/10/2009 0740     UPC  No results found for: \"TOTPROTUR\" No results found for: \"CREATININEU\"      Imaging interpreted by radiologist. Imaging reports " reviewed with pertinent findings below  DX-CHEST-PORTABLE (1 VIEW)   Final Result      Resolution of the right pneumothorax status post new large-bore right-sided chest tube placement.      DX-CHEST-PORTABLE (1 VIEW)   Final Result         1. Slight increase in right pneumothorax since yesterday. Kink in right pleural pigtail drain noted.      2. Right rib fractures. Sternal wires.      DX-CHEST-PORTABLE (1 VIEW)   Final Result      Interval increase in size of the right-sided pneumothorax.      DX-CHEST-PORTABLE (1 VIEW)   Final Result         1. Slight increase in 3 cm right pneumothorax. Right pleural pigtail drain remains.      2. Right rib fractures.      3. Mild infiltrates. Trace left effusion. Sternal wires.                     DX-CHEST-PORTABLE (1 VIEW)   Final Result         1.  No acute cardiopulmonary disease.   2.  Right pneumothorax, appears slightly increased since prior study   3.  Cardiomegaly   4.  Atherosclerosis      DX-CHEST-PORTABLE (1 VIEW)   Final Result         1.  No acute cardiopulmonary disease.   2.  Right lung base pneumothorax, appears slightly increased since prior study   3.  Atherosclerosis      DX-CHEST-PORTABLE (1 VIEW)   Final Result      1.  Interval placement of a right-sided small bore thoracostomy tube.   2.  No significant residual right sided effusion.   3.  Small right-sided pneumothorax.      IR-CHEST TUBE-EMPYEMA RIGHT   Final Result      Successful US GUIDED right  CHEST TUBE PLACEMENT.      DX-CHEST-PORTABLE (1 VIEW)   Final Result         1.  Scattered hazy bilateral pulmonary edema and/or infiltrates, greater on the right, similar to prior study   2.  Moderate to large layering right pleural effusion, stable   3.  Atherosclerosis      DX-CHEST-LIMITED (1 VIEW)   Final Result         Moderate right pleural effusion with patchy right lung opacity, worse than prior.      US-THORACENTESIS PUNCTURE RIGHT   Final Result      1. Ultrasound guided right sided therapeutic  thoracentesis.      2. 2000 mL of fluid withdrawn.      DX-CHEST-PORTABLE (1 VIEW)   Final Result      No evidence of right-sided pneumothorax status post thoracentesis. Moderate residual right-sided pleural fluid.      DX-CHEST-PORTABLE (1 VIEW)   Final Result         1.  Scattered hazy bilateral pulmonary edema and/or infiltrates are   2.  Moderate to large layering right pleural effusion   3.  Atherosclerosis      CT-CHEST (THORAX) W/O   Final Result         1.  Right posterior eighth through 11th rib fractures, with flail segment of the ninth rib.   2.  Large layering right pleural effusion   3.  Linear consolidations in the right lung base favoring atelectasis, component of infiltrate not excluded.   4.  Atherosclerosis and atherosclerotic coronary artery disease   5.  Irregular hepatic contour favoring changes of cirrhosis      DX-PELVIS-1 OR 2 VIEWS   Final Result         1.  No acute traumatic bony injury.   2.  Atherosclerosis      [id number] (WITH 1-VIEW CXR) RIGHT   Final Result         1.  Right lateral eighth rib fracture and posterior right 10th and 11th rib fractures   2.  Segmental right ninth rib fracture, compatible with component of flail chest.   3.  Moderate to large layering right pleural effusion   4.  Scattered patchy bilateral pulmonary infiltrates   5.  Atherosclerosis            Current Facility-Administered Medications   Medication Dose Route Frequency Provider Last Rate Last Admin    epoetin (Retacrit) injection 3,000 Units  3,000 Units Intravenous MO, WE + FR Wander Gerardo M.D.        sevelamer carbonate (Renvela) tablet 800 mg  800 mg Oral TID WITH MEALS Wander Gerardo M.D.   800 mg at 01/19/25 1705    loperamide (Imodium) capsule 2 mg  2 mg Oral 4X/DAY PRN Saida Carter A.P.N.   2 mg at 01/19/25 1646    NS (Bolus) 0.9 % infusion 100 mL  100 mL Intravenous DIALYSIS PRN Catarina Calvo M.D.        HYDROmorphone (Dilaudid) injection 0.5 mg  0.5 mg Intravenous Q3HRS PRN Lynne LIN  PAUL Milligan        Respiratory Therapy Consult   Nebulization Continuous RT Edward Chacon M.D.        Pharmacy Consult Request ...Pain Management Review 1 Each  1 Each Other PHARMACY TO DOSE Edward Chacon M.D.        ondansetron (Zofran) syringe/vial injection 4 mg  4 mg Intravenous Q4HRS PRN Edward Chacon M.D.   4 mg at 01/19/25 1622    ondansetron (Zofran ODT) dispertab 4 mg  4 mg Oral Q4HRS PRN Edward Chacon M.D.        docusate sodium (Colace) capsule 100 mg  100 mg Oral BID Edward Chacon M.D.   100 mg at 01/15/25 0516    senna-docusate (Pericolace Or Senokot S) 8.6-50 MG per tablet 1 Tablet  1 Tablet Oral Nightly Edward Chacon M.D.        senna-docusate (Pericolace Or Senokot S) 8.6-50 MG per tablet 1 Tablet  1 Tablet Oral Q24HRS PRN Edward Chacon M.D.        polyethylene glycol/lytes (Miralax) Packet 1 Packet  1 Packet Oral BID Edward Chacon M.D.   1 Packet at 01/15/25 0516    bisacodyl (Dulcolax) suppository 10 mg  10 mg Rectal Q24HRS PRN Edward Chacon M.D.        sodium phosphate enema 1 Enema  1 Enema Rectal Once PRN Edward Chacon M.D.        [Held by provider] heparin injection 5,000 Units  5,000 Units Subcutaneous Q8HRS Edward Chacon M.D.        oxyCODONE immediate-release (Roxicodone) tablet 5 mg  5 mg Oral Q3HRS PRN Edward Chacon M.D.   5 mg at 01/17/25 1219    Or    oxyCODONE immediate release (Roxicodone) tablet 10 mg  10 mg Oral Q3HRS PRN Edward Chacon M.D.        acetaminophen (Tylenol) tablet 650 mg  650 mg Oral 4X/DAY Edward Chacon M.D.   650 mg at 01/19/25 1705    lidocaine (Asperflex) 4 % patch 1 Patch  1 Patch Transdermal Q24HR Edward Chacon M.D.   1 Patch at 01/18/25 6803    gabapentin (Neurontin) capsule 100 mg  100 mg Oral BID Edward Chacon M.D.   100 mg at 01/19/25 1705    [Held by provider] methocarbamol (Robaxin) tablet 750 mg  750 mg Oral 4X/DAY Edward Chacon M.D.   750 mg at 01/15/25 1250    hydrALAZINE  (Apresoline) injection 10-20 mg  10-20 mg Intravenous Q4HRS PRN Edward Chacon M.D.   20 mg at 01/17/25 0909    labetalol (Normodyne/Trandate) injection 10-20 mg  10-20 mg Intravenous Q4HRS PRN Edward Chacon M.D.   10 mg at 01/19/25 1544    [Held by provider] carvedilol (Coreg) tablet 3.125 mg  3.125 mg Oral BID WITH MEALS Edward Chacon M.D.        [Held by provider] NIFEdipine SR (Procadia-XL) tablet 90 mg  90 mg Oral Q EVENING Edward Chacon M.D.        omeprazole (PriLOSEC) capsule 20 mg  20 mg Oral DAILY Edward Chacon M.D.   20 mg at 01/19/25 0517    [Held by provider] amLODIPine (Norvasc) tablet 10 mg  10 mg Oral Q DAY Edward Chacon M.D.        dextrose 50% (D50W) injection 25 g  25 g Intravenous Q15 MIN PRN Linsey M Wegener, A.P.RALLEN             Assessment/Plan  60 y.o. male with a history of anuric ESRD on hemodialysis Monday Wednesday Friday, diabetes, hypertension admitted 1/14/2025 with ground-level fall, with rib fractures after falling in the shower, complicated by right hemothorax    1.  ESRD on hemodialysis Monday Wednesday Friday.  Anuric.  Stable.  Next planned dialysis Monday, 1/20/2025.  Check daily weights.  Check renal function panel daily.    2.  Dialysis access: Left upper arm AV fistula, patent.  Continue left arm precautions.       3.  Ground-level fall and right hemothorax, reason for admission.   Patient remains with right-sided chest tube, upsized to a larger chest tube on 1/19/2025.    4.  Anemia of chronic disease.  Persistent.  I have ordered Epogen 3 times weekly with dialysis, to start 1/20/2025.  Last dose of Mircera in outpatient clinic was given on 1/6/2025.  Check CBC daily.    5.  Hypertension.  Labile, better controlled after dialysis, and with pain control.  We will continue ultrafiltration as tolerated by blood pressure, as high blood pressure usually improves with ultrafiltration with dialysis.  Recommend low-sodium diet.       6.  Hyperphosphatemia,  "controlled.  Continue phosphorus binders, sevelamer carbonate 800 mg p.o. 3 times daily with meals.  Recommend low phosphorus diet.  Check \"renal function panel\" daily (includes phosphorus level).       Wander Gerardo MD  Nephrology  [facility]          "

## 2025-01-20 NOTE — PROGRESS NOTES
Trauma / Surgical Daily Progress Note    Date of Service  1/20/2025    Chief Complaint  60 y.o. male admitted 1/14/2025 with rib fractures report fall in showe     Interval Events  Zack Gale receiving care in the trauma ICU  Overnight increased oxygen requirements  Hypotension requiring vasopressor support  Sepsis workup, antibiotics per trauma protocols  Dialysis access looped AV graft thrombosed  Sclerosed upper veins by imaging  Emergent temporary dialysis catheter placed consent from family and patient  Dialysis planned  Monitor response  Malnourished chronic diarrhea  Seen by nutrition  Continue supplements encourage p.o.  Follow-up GI evaluation    The patient is critically ill   The patient was seen and examined on rounds and discussed with the multidisciplinary critical care team and consulting physicians. Critically evaluated laboratory tests, culture data, medications, imaging, and other diagnostic tests.    The patient has acute impairment of one or more vital organ systems and a high probability of imminent or life-threatening deterioration in condition. Provided high complexity decision making to assess, manipulate, and support vital system functions to treat vital organ system failure and/or to prevent further life-threatening deterioration of the patient's condition. Requires continued ICU management and hospital admission.    Critical care interventions include: integration of multiple data points and associated complex medical decision making, titration of vasopressors, and management of critical electrolyte abnormalities.    Vital Signs for last 24 hours  Temp:  [36.7 °C (98 °F)-37.3 °C (99.1 °F)] 36.7 °C (98.1 °F)  Pulse:  [] 84  Resp:  [13-68] 30  BP: ()/(46-86) 96/53  SpO2:  [78 %-100 %] 93 %    Hemodynamic parameters for last 24 hours       Respiratory Data     Respiration: (!) 30, Pulse Oximetry: 93 %     Work Of Breathing / Effort: Shallow;Mild  RUL Breath Sounds:  Clear;Diminished, RML Breath Sounds: Clear;Diminished, RLL Breath Sounds: Diminished, DEVIN Breath Sounds: Clear, LLL Breath Sounds: Clear    Physical Exam  Physical Exam  Vitals and nursing note reviewed.       Friendly cooperative  Critically ill  Cachectic  Skin warm brisk capillary fill palpable pulse  Breathing with ease no cough no distress  Chest tube in place with right-sided chest wall tenderness  Abdomen soft nontender no guarding no rebound  Skin appr  Laboratory  Recent Results (from the past 24 hours)   POCT glucose device results    Collection Time: 01/19/25 11:58 PM   Result Value Ref Range    POC Glucose, Blood 55 (L) 65 - 99 mg/dL   POCT glucose device results    Collection Time: 01/20/25 12:30 AM   Result Value Ref Range    POC Glucose, Blood 158 (H) 65 - 99 mg/dL   POCT glucose device results    Collection Time: 01/20/25  1:26 AM   Result Value Ref Range    POC Glucose, Blood 128 (H) 65 - 99 mg/dL   CBC WITH DIFFERENTIAL    Collection Time: 01/20/25  4:16 AM   Result Value Ref Range    WBC 2.5 (L) 4.8 - 10.8 K/uL    RBC 3.47 (L) 4.70 - 6.10 M/uL    Hemoglobin 9.4 (L) 14.0 - 18.0 g/dL    Hematocrit 30.2 (L) 42.0 - 52.0 %    MCV 87.0 81.4 - 97.8 fL    MCH 27.1 27.0 - 33.0 pg    MCHC 31.1 (L) 32.3 - 36.5 g/dL    RDW 54.7 (H) 35.9 - 50.0 fL    Platelet Count 112 (L) 164 - 446 K/uL    MPV 9.8 9.0 - 12.9 fL    Neutrophils-Polys 79.60 (H) 44.00 - 72.00 %    Lymphocytes 14.80 (L) 22.00 - 41.00 %    Monocytes 4.40 0.00 - 13.40 %    Eosinophils 0.80 0.00 - 6.90 %    Basophils 0.40 0.00 - 1.80 %    Immature Granulocytes 0.00 0.00 - 0.90 %    Nucleated RBC 0.00 0.00 - 0.20 /100 WBC    Neutrophils (Absolute) 1.99 1.82 - 7.42 K/uL    Lymphs (Absolute) 0.37 (L) 1.00 - 4.80 K/uL    Monos (Absolute) 0.11 0.00 - 0.85 K/uL    Eos (Absolute) 0.02 0.00 - 0.51 K/uL    Baso (Absolute) 0.01 0.00 - 0.12 K/uL    Immature Granulocytes (abs) 0.00 0.00 - 0.11 K/uL    NRBC (Absolute) 0.00 K/uL   Basic Metabolic Panel     Collection Time: 01/20/25  4:16 AM   Result Value Ref Range    Sodium 138 135 - 145 mmol/L    Potassium 3.6 3.6 - 5.5 mmol/L    Chloride 96 96 - 112 mmol/L    Co2 29 20 - 33 mmol/L    Glucose 78 65 - 99 mg/dL    Bun 43 (H) 8 - 22 mg/dL    Creatinine 5.33 (HH) 0.50 - 1.40 mg/dL    Calcium 8.4 (L) 8.5 - 10.5 mg/dL    Anion Gap 13.0 7.0 - 16.0   ESTIMATED GFR    Collection Time: 01/20/25  4:16 AM   Result Value Ref Range    GFR (CKD-EPI) 12 (A) >60 mL/min/1.73 m 2   POCT glucose device results    Collection Time: 01/20/25  6:53 AM   Result Value Ref Range    POC Glucose, Blood 32 (LL) 65 - 99 mg/dL   POCT glucose device results    Collection Time: 01/20/25  7:33 AM   Result Value Ref Range    POC Glucose, Blood 117 (H) 65 - 99 mg/dL   POCT glucose device results    Collection Time: 01/20/25  9:01 AM   Result Value Ref Range    POC Glucose, Blood 67 65 - 99 mg/dL   POCT glucose device results    Collection Time: 01/20/25  9:36 AM   Result Value Ref Range    POC Glucose, Blood 158 (H) 65 - 99 mg/dL   POCT glucose device results    Collection Time: 01/20/25 11:15 AM   Result Value Ref Range    POC Glucose, Blood 104 (H) 65 - 99 mg/dL       Fluids    Intake/Output Summary (Last 24 hours) at 1/20/2025 1307  Last data filed at 1/20/2025 0800  Gross per 24 hour   Intake 680 ml   Output 1031 ml   Net -351 ml       Core Measures & Quality Metrics  Core Measures & Quality Metrics  RAP Score Total: 4    CAGE Results: negative Blood Alcohol>0.08: no       Assessment/Plan  * Trauma- (present on admission)  Assessment & Plan  Ground level fall.  Non Trauma Activation.  Edward Singleton MD. Trauma Surgery    Pleural effusion on right- (present on admission)  Assessment & Plan  Noted on chest x-ray from 10/2024  Much larger on today's x-ray  CT shows very large effusion, unknown if only related to increase of previous effusion or has a hemothorax component.  1/15 Thoracentesis completed.  Residual pleural fluid collection.  1/16 IR  chest tube placement, pleurevac filled and replaced.  1/19 IR drain replaced chest tube for persistent increasing pneumothorax  Ongoing chest tube drainage  Monitor for signs of bleeding    Closed fracture of four ribs, initial encounter- (present on admission)  Assessment & Plan  Right eighth, ninth, tenth, and eleventh rib fractures; flail segment of rib nine.  Aggressive pulmonary hygiene and multimodal pain management   chest tube drainage 630  Continue chest tube drainage  Follow-up imaging    Adrenal insufficiency (HCC)- (present on admission)  Assessment & Plan  Cortisol level 13.5.  100 mg hydrocortisone IV, SBP improved.  1/16 50 mg hydrocortisone IV for 3 doses ordered.  Monitor response supplement as needed    Hypoglycemia- (present on admission)  Assessment & Plan  Admission serum glucose 60.   Serial glucose monitoring.   Hypoglycemia protocol.   Monitor.     Essential (primary) hypertension- (present on admission)  Assessment & Plan  Uncontrolled, SBP >200mmHg in ED.   Chronic condition treated with carvedilol and nifedipine.  Amlodipine initiated.  PRN antihypertensives.    ESRD needing dialysis (HCC)- (present on admission)  Assessment & Plan  HD MWF  Nephrology consulted from ER  1/16 Hemodialysis  Dialysis per nephrology  Catarina Calvo M.D., Nephrology     Malnutrition ([facility])  Assessment & Plan  Nutritional therapies  Follow-up GI evaluation recommendations    No contraindication to deep vein thrombosis (DVT) prophylaxis- (present on admission)  Assessment & Plan  Prophylactic dose heparin 5000 u q 8 hr initiated upon admission.    Coronary artery disease involving coronary bypass graft of native heart without angina pectoris- (present on admission)  Assessment & Plan  Chronic condition treated with aspirin.  Holding maintenance medication during acute traumatic illness.        Discussed patient condition with Family, RN, RT, Therapies, Pharmacy, Dietary, Charge nurse / hot rounds, Patient, and  nephrology.  CRITICAL CARE TIME EXCLUDING PROCEDURES: 45    minutes

## 2025-01-20 NOTE — PROGRESS NOTES
Nephrology Daily Progress Note    Date of Service  1/20/2025    Chief Complaint  60 y.o. male with a history of anuric ESRD on hemodialysis Monday Wednesday Friday, diabetes, hypertension admitted 1/14/2025 with ground-level fall, with rib fractures after falling in the shower, complicated by right hemothorax    Interval Problem Update  1/17 -patient had dialysis yesterday with 1.5 liters removed.  Discussed with patient dialysis again today to get him back on Monday Wednesday Friday schedule.  Patient complains of right sided chest/back pain where the thoracostomy tube is.  Denies shortness of breath.  1/18-patient had dialysis yesterday with 2 L removed.  Denies chest pain, shortness of breath.  1/19 -patient had a larger chest tube today.  Complains of some soreness on his right chest.  Denies shortness of breath.  1/20 patient is hypotensive this morning, encephalopathic  We tried hemodialysis but his left upper arm AV fistula was thrombosed  Review of Systems  Review of Systems   Unable to perform ROS: Medical condition        Physical Exam  Temp:  [36.7 °C (98 °F)-37.3 °C (99.1 °F)] 36.7 °C (98.1 °F)  Pulse:  [] 69  Resp:  [13-68] 22  BP: ()/(46-86) 95/54  SpO2:  [78 %-100 %] 90 %    Physical Exam  Vitals and nursing note reviewed.   Constitutional:       Appearance: He is ill-appearing.      Interventions: Nasal cannula in place.   HENT:      Head: Normocephalic and atraumatic.      Right Ear: External ear normal.      Left Ear: External ear normal.      Nose: Nose normal.      Mouth/Throat:      Pharynx: No oropharyngeal exudate or posterior oropharyngeal erythema.      Comments: ETT  Eyes:      General:         Right eye: No discharge.         Left eye: No discharge.      Conjunctiva/sclera: Conjunctivae normal.   Cardiovascular:      Rate and Rhythm: Normal rate and regular rhythm.   Pulmonary:      Effort: Respiratory distress present.      Breath sounds: No wheezing.      Comments: Coarse  "BS  Abdominal:      General: Abdomen is flat. Bowel sounds are normal.   Musculoskeletal:      Cervical back: No rigidity. No muscular tenderness.      Right lower leg: Edema present.      Left lower leg: Edema present.   Skin:     General: Skin is warm and dry.      Coloration: Skin is not jaundiced.   Neurological:      Mental Status: He is lethargic.   Psychiatric:         Behavior: Behavior normal.     Dialysis access: Left upper arm AV fistula with patent bruit and thrill    Fluids    Intake/Output Summary (Last 24 hours) at 1/20/2025 1224  Last data filed at 1/20/2025 0800  Gross per 24 hour   Intake 680 ml   Output 1031 ml   Net -351 ml       Laboratory  Labs reviewed, pertinent labs below.  Recent Labs     01/18/25  0505 01/19/25 0357 01/20/25 0416   WBC 5.1 6.0 2.5*   RBC 3.20* 3.24* 3.47*   HEMOGLOBIN 8.5* 8.7* 9.4*   HEMATOCRIT 27.7* 27.8* 30.2*   MCV 86.6 85.8 87.0   MCH 26.6* 26.9* 27.1   MCHC 30.7* 31.3* 31.1*   RDW 55.0* 54.4* 54.7*   PLATELETCT 150* 136* 112*   MPV 10.6 9.6 9.8     Recent Labs     01/18/25  0505 01/19/25  0357 01/20/25  0416   SODIUM 135 136 138   POTASSIUM 4.0 4.0 3.6   CHLORIDE 95* 97 96   CO2 28 25 29   GLUCOSE 137* 82 78   BUN 22 30* 43*   CREATININE 3.11* 4.24* 5.33*   CALCIUM 8.0* 8.1* 8.4*                 URINALYSIS:  Lab Results   Component Value Date/Time    COLORURINE Yellow 05/10/2009 0740    CLARITY Clear 05/10/2009 0740    SPECGRAVITY 1.015 05/10/2009 0740    PHURINE 6.0 05/10/2009 0740    KETONES 40 (A) 05/10/2009 0740    PROTEINURIN Negative 05/10/2009 0740    BILIRUBINUR Negative 05/10/2009 0740    NITRITE Negative 05/10/2009 0740    LEUKESTERAS Negative 05/10/2009 0740    OCCULTBLOOD Negative 05/10/2009 0740     UPC  No results found for: \"TOTPROTUR\" No results found for: \"CREATININEU\"      Imaging interpreted by radiologist. Imaging reports reviewed with pertinent findings below  DX-CHEST-PORTABLE (1 VIEW)   Final Result      1.  Small right pleural effusion.   2. "  Increased right lower lung pneumonia.   3.  No appreciable right pneumothorax. Right thoracostomy tube in place.      DX-CHEST-PORTABLE (1 VIEW)   Final Result         1.  Mild pulmonary edema and/or infiltrates increasing right lung base compared to prior study.   2.  Small right and trace left pleural effusion, stable.   3.  Right pneumothorax, appears slightly increased since prior study   4.  Cardiomegaly   5.  Atherosclerosis      DX-CHEST-PORTABLE (1 VIEW)   Final Result         1.  Mild pulmonary edema and/or infiltrates.   2.  Small right and trace left pleural effusion.   3.  Right pneumothorax, appears slightly increased since prior study   4.  Cardiomegaly   5.  Atherosclerosis      DX-CHEST-PORTABLE (1 VIEW)   Final Result      Resolution of the right pneumothorax status post new large-bore right-sided chest tube placement.      DX-CHEST-PORTABLE (1 VIEW)   Final Result         1. Slight increase in right pneumothorax since yesterday. Kink in right pleural pigtail drain noted.      2. Right rib fractures. Sternal wires.      DX-CHEST-PORTABLE (1 VIEW)   Final Result      Interval increase in size of the right-sided pneumothorax.      DX-CHEST-PORTABLE (1 VIEW)   Final Result         1. Slight increase in 3 cm right pneumothorax. Right pleural pigtail drain remains.      2. Right rib fractures.      3. Mild infiltrates. Trace left effusion. Sternal wires.                     DX-CHEST-PORTABLE (1 VIEW)   Final Result         1.  No acute cardiopulmonary disease.   2.  Right pneumothorax, appears slightly increased since prior study   3.  Cardiomegaly   4.  Atherosclerosis      DX-CHEST-PORTABLE (1 VIEW)   Final Result         1.  No acute cardiopulmonary disease.   2.  Right lung base pneumothorax, appears slightly increased since prior study   3.  Atherosclerosis      DX-CHEST-PORTABLE (1 VIEW)   Final Result      1.  Interval placement of a right-sided small bore thoracostomy tube.   2.  No significant  residual right sided effusion.   3.  Small right-sided pneumothorax.      IR-CHEST TUBE-EMPYEMA RIGHT   Final Result      Successful US GUIDED right  CHEST TUBE PLACEMENT.      DX-CHEST-PORTABLE (1 VIEW)   Final Result         1.  Scattered hazy bilateral pulmonary edema and/or infiltrates, greater on the right, similar to prior study   2.  Moderate to large layering right pleural effusion, stable   3.  Atherosclerosis      DX-CHEST-LIMITED (1 VIEW)   Final Result         Moderate right pleural effusion with patchy right lung opacity, worse than prior.      US-THORACENTESIS PUNCTURE RIGHT   Final Result      1. Ultrasound guided right sided therapeutic thoracentesis.      2. 2000 mL of fluid withdrawn.      DX-CHEST-PORTABLE (1 VIEW)   Final Result      No evidence of right-sided pneumothorax status post thoracentesis. Moderate residual right-sided pleural fluid.      DX-CHEST-PORTABLE (1 VIEW)   Final Result         1.  Scattered hazy bilateral pulmonary edema and/or infiltrates are   2.  Moderate to large layering right pleural effusion   3.  Atherosclerosis      CT-CHEST (THORAX) W/O   Final Result         1.  Right posterior eighth through 11th rib fractures, with flail segment of the ninth rib.   2.  Large layering right pleural effusion   3.  Linear consolidations in the right lung base favoring atelectasis, component of infiltrate not excluded.   4.  Atherosclerosis and atherosclerotic coronary artery disease   5.  Irregular hepatic contour favoring changes of cirrhosis      DX-PELVIS-1 OR 2 VIEWS   Final Result         1.  No acute traumatic bony injury.   2.  Atherosclerosis      FL-[id number]-DGGXACYMLE (WITH 1-VIEW CXR) RIGHT   Final Result         1.  Right lateral eighth rib fracture and posterior right 10th and 11th rib fractures   2.  Segmental right ninth rib fracture, compatible with component of flail chest.   3.  Moderate to large layering right pleural effusion   4.  Scattered patchy bilateral  pulmonary infiltrates   5.  Atherosclerosis            Current Facility-Administered Medications   Medication Dose Route Frequency Provider Last Rate Last Admin    ketamine (Ketalar) 50 mg/mL injection  100 mg Intravenous Once Smith Bowles M.D.        rocuronium (Zemuron) injection 100 mg  100 mg Intravenous Once Smith Bowles M.D.        norepinephrine (Levophed) 8 mg in 250 mL NS infusion (premix)  0-1 mcg/kg/min (Ideal) Intravenous Continuous Hua Bran M.D. 25.7 mL/hr at 01/20/25 1210 0.2 mcg/kg/min at 01/20/25 1210    dextrose 10% infusion   Intravenous Continuous Hua Bran M.D.        cefepime (Maxipime) 1 g in  mL IVPB  1 g Intravenous Q24HRS Hua Bran M.D.        And    MD Alert...Vancomycin per Pharmacy   Other PHARMACY TO DOSE Hua Bran M.D.        vancomycin (Vancocin) 1,250 mg in  mL IVPB  25 mg/kg Intravenous Once Hua Bran M.D.        prochlorperazine (Compazine) injection 5 mg  5 mg Intravenous Once Hua Bran M.D.        epoetin (Retacrit) injection 3,000 Units  3,000 Units Intravenous MO, WE + FR Wander Gerardo M.D.        sevelamer carbonate (Renvela) tablet 800 mg  800 mg Oral TID WITH MEALS Wander Gerardo M.D.   800 mg at 01/19/25 1705    loperamide (Imodium) capsule 2 mg  2 mg Oral 4X/DAY PRN Saida Carter, A.P.N.   2 mg at 01/19/25 1646    NS (Bolus) 0.9 % infusion 100 mL  100 mL Intravenous DIALYSIS PRN Catarina Calvo M.D.        HYDROmorphone (Dilaudid) injection 0.5 mg  0.5 mg Intravenous Q3HRS PRN Lynne Milligan P.A.-C.   0.5 mg at 01/20/25 0608    Respiratory Therapy Consult   Nebulization Continuous RT Edward Chacon M.D.        Pharmacy Consult Request ...Pain Management Review 1 Each  1 Each Other PHARMACY TO DOSE Edward Chacon M.D.        ondansetron (Zofran) syringe/vial injection 4 mg  4 mg Intravenous Q4HRS PRN Edward Chacon M.D.   4 mg at 01/20/25 0942    ondansetron (Zofran ODT) dispertab 4 mg  4 mg Oral Q4HRS PRN  Edward Chacon M.D.        docusate sodium (Colace) capsule 100 mg  100 mg Oral BID Edward Chacon M.D.   100 mg at 01/15/25 0516    senna-docusate (Pericolace Or Senokot S) 8.6-50 MG per tablet 1 Tablet  1 Tablet Oral Nightly Edward Chacon M.D.        senna-docusate (Pericolace Or Senokot S) 8.6-50 MG per tablet 1 Tablet  1 Tablet Oral Q24HRS PRN Edward Chacon M.D.        polyethylene glycol/lytes (Miralax) Packet 1 Packet  1 Packet Oral BID Edward Chacon M.D.   1 Packet at 01/15/25 0516    bisacodyl (Dulcolax) suppository 10 mg  10 mg Rectal Q24HRS PRN Edward Chacon M.D.        sodium phosphate enema 1 Enema  1 Enema Rectal Once PRN Edward Chacon M.D.        [Held by provider] heparin injection 5,000 Units  5,000 Units Subcutaneous Q8HRS Edward Chacon M.D.        oxyCODONE immediate-release (Roxicodone) tablet 5 mg  5 mg Oral Q3HRS PRN Edward Chacon M.D.   5 mg at 01/19/25 1953    Or    oxyCODONE immediate release (Roxicodone) tablet 10 mg  10 mg Oral Q3HRS PRN Edward Chacon M.D.        acetaminophen (Tylenol) tablet 650 mg  650 mg Oral 4X/DAY Edward Chacon M.D.   650 mg at 01/19/25 1705    lidocaine (Asperflex) 4 % patch 1 Patch  1 Patch Transdermal Q24HR Edward Cahcon M.D.   1 Patch at 01/19/25 2126    gabapentin (Neurontin) capsule 100 mg  100 mg Oral BID Edward Chacon M.D.   100 mg at 01/19/25 1705    [Held by provider] methocarbamol (Robaxin) tablet 750 mg  750 mg Oral 4X/DAY Edward Chacon M.D.   750 mg at 01/15/25 1250    hydrALAZINE (Apresoline) injection 10-20 mg  10-20 mg Intravenous Q4HRS PRN Edward Chacon M.D.   20 mg at 01/17/25 0909    labetalol (Normodyne/Trandate) injection 10-20 mg  10-20 mg Intravenous Q4HRS PRN Edward Chacon M.D.   10 mg at 01/19/25 1544    [Held by provider] carvedilol (Coreg) tablet 3.125 mg  3.125 mg Oral BID WITH MEALS Edward Chacon M.D.        [Held by provider] NIFEdipine SR (Procadia-XL) tablet 90 mg  90 mg  Oral Q EVENING Edward Chacon M.D.        omeprazole (PriLOSEC) capsule 20 mg  20 mg Oral DAILY Edward Chacon M.D.   20 mg at 01/19/25 0517    [Held by provider] amLODIPine (Norvasc) tablet 10 mg  10 mg Oral Q DAY Edward Chacon M.D.        dextrose 50% (D50W) injection 25 g  25 g Intravenous Q15 MIN PRN Linsey M Wegener, A.P.R.N.   25 g at 01/20/25 0909         Assessment/Plan  60 y.o. male with a history of anuric ESRD on hemodialysis Monday Wednesday Friday, diabetes, hypertension admitted 1/14/2025 with ground-level fall, with rib fractures after falling in the shower, complicated by right hemothorax    1.  ESRD on hemodialysis Monday Wednesday Friday.    2.  Dialysis access: Left upper arm AV fistula, thrombosed  3.  Ground-level fall and right hemothorax,  4.  Anemia of chronic disease.  5.  Hypotension: No clear etiology, need to rule out sepsis  Plan for HD when hemodialysis access available  I consulted Dr. Vazquez from vascular surgery, patient is not a good surgical candidate at the moment  Plan for temporary hemodialysis catheter placement  JEANINE with dialysis  Renal diet  Daily BMP, CBC.  Renal dose all meds  Avoid nephrotoxins like NSAIDs.  Prognosis guarded.  Plan discussed with Dr. Vazquez and Dr. Bran

## 2025-01-20 NOTE — PROGRESS NOTES
Bgl 67 1 amp d50 admin delay in maintenance fluid d/t no stock on unit was ordered stat. Dialysis fistula not working, dialysis nurse notified nephrologist.

## 2025-01-20 NOTE — CARE PLAN
The patient is Watcher - Medium risk of patient condition declining or worsening    Shift Goals  Clinical Goals: hemodynamic stability; pulmonary hygiene; mobility; improve IS volume; pain control  Patient Goals: sleep  Family Goals: ASH; no family present    Progress made toward(s) clinical / shift goals:  Patient was mobilized to edge of bed and was encouraged to deep breath and cough. Patient's pain has been managed through repositioning.       Problem: Pain - Standard  Goal: Alleviation of pain or a reduction in pain to the patient’s comfort goal  Outcome: Progressing     Problem: Knowledge Deficit - Standard  Goal: Patient and family/care givers will demonstrate understanding of plan of care, disease process/condition, diagnostic tests and medications  Outcome: Progressing     Problem: Fall Risk  Goal: Patient will remain free from falls  Outcome: Progressing     Problem: Skin Integrity  Goal: Skin integrity is maintained or improved  Outcome: Progressing        inguinal L

## 2025-01-20 NOTE — PROGRESS NOTES
Vascular surgery.    Patient was seen and examined.  There is a thrombosed looped AV graft in the left forearm and thrombosed AV graft in the left upper arm.    Patient was hypotensive and barely responsive.    Patient is not stable enough for thrombectomy/revision of the left arm dialysis graft under general anesthesia.    Recommend temporary dialysis catheter placement by ICU service for dialysis and reconsult vascular service for left arm graft thrombectomy/revision once patient is more stable.    Discussed with Dr. Najjar who agreed with plan.

## 2025-01-20 NOTE — PROCEDURES
DATE OF OPERATION:  1/20/2025    PREOPERATIVE DIAGNOSIS: Dialysis dependent renal failure, thrombosed access, sclerotic upper veins    PROCEDURE PERFORMED: Temporary dialysis catheter right femoral    SURGEON:   Hua Barn M.D.    ASSISTANT:   Lynsey Lawrence DNP.    INDICATIONS: The patient is a 60 year-old man with Dialysis dependent renal failure, thrombosed access, sclerotic upper veins. A central venous line is placed at the bedside.     PROCEDURE: Following informed consent family consent  the patient was properly identified and optimally positioned. Intravenous sedation and analgesia was administered. The procedural site was prepped with ChloraPrep® and draped in a standard fashion. Full barrier precautions were employed.  The femoral vein was accessed percutaneously with use of ultrasound and a .032 flexible guide wire was advanced without resistance.  Temporary dialysis catheter was passed using sterile Seldinger technique. The flexible guide wire was removed intact. The catheter was secured to the skin with silk sutures. A sterile dressing was applied. All ports flushed and aspirated freely.      The patient tolerated the procedure well. There were no apparent complications.        ____________________________________     Hua Bran M.D.    DD: 1/20/2025  1:08 PM

## 2025-01-20 NOTE — DISCHARGE PLANNING
Outpatient Dialysis Note     Confirmed patient is active at:     [facility]  [address] , [place] [address]     Schedule: Mon, Wed, Fri   Time: 5:30 AM     Patient is followed by Dr. Calvo.     Spoke with Henry at facility who confirmed patient information.   Forwarded records for review.     Xitlaly Reyes   Dialysis Coordinator / [facility]  Ph: [phone]

## 2025-01-20 NOTE — CARE PLAN
The patient is Watcher - Medium risk of patient condition declining or worsening    Shift Goals  Clinical Goals: monitor chest tube output, pulmonary hygine, imporive IS volume  Patient Goals: rest  Family Goals: unable to assess- no family present    Progress made toward(s) clinical / shift goals:          Problem: Pain - Standard  Goal: Alleviation of pain or a reduction in pain to the patient’s comfort goal  Outcome: Progressing     Problem: Knowledge Deficit - Standard  Goal: Patient and family/care givers will demonstrate understanding of plan of care, disease process/condition, diagnostic tests and medications  Outcome: Progressing     Problem: Fall Risk  Goal: Patient will remain free from falls  Outcome: Progressing     Problem: Skin Integrity  Goal: Skin integrity is maintained or improved  Outcome: Progressing

## 2025-01-21 PROBLEM — D72.819 LEUKOPENIA: Status: ACTIVE | Noted: 2025-01-01

## 2025-01-21 NOTE — PROCEDURES
DATE OF OPERATION:  1/21/2025    PREOPERATIVE DIAGNOSIS: Right  unresolved pneumothorax with pigtail in place .    POSTOPERATIVE DIAGNOSIS: Same.     PROCEDURE PERFORMED: Right tube thoracostomy.     SURGEON:    MAYA Lorenz    ASSISTANT:    Hua Bran M.D.    ANESTHESIA:   local anesthesia and intravenous sedation.    INDICATIONS: The patient is a 60 year-old man with a right pneumothorax.  Patient did recently have a pigtail chest tube placed however he did continue to have persistent pneumothorax on chest x-ray.  Therefore the PEG till chest tube was removed and  A right tube thoracostomy was performed.        SPECIMEN:     none.    ESTIMATED BLOOD LOSS:  none mL.    PROCEDURE: Following informed consent consent, the patient was properly identified and optimally positioned. An anesthetic consisting of local anesthesia was administered. The right chest wall was widely prepped with ChloraPrep® and draped into a sterile field.      Local anesthetic was used to infiltrate the chest tube site.   A transverse incision was made in the right 4-5th intercostal space, anterior axillary line and the subcutaneous tissue spread bluntly. The thoracic cavity was accessed bluntly over the rib and a 22 Fr  chest tube placed in a anterior apical orientation and secured to the skin with a 0 Ethibond vertical mattress suture.     The chest tube was connected to closed suction drainage and a sterile dressing was applied. The patient tolerated the procedure well. There were no apparent complications.        ____________________________________     MERCY Lorenz.    DD: 1/21/2025  9:07 AM

## 2025-01-21 NOTE — PROGRESS NOTES
[facility] Progress Note     Hemodialysis treatment  x 3 hours as ordered per Dr. Najjar .  Treatment initiated at 1419 and ended at 1623 .      Pt had labile BP and O2 sat throughout tx. Hospital staff requested to end HD because pt needed to be intubated. Pt HD tx ended approx 1 hour early. Dr. Najjar aware. Pt now intubated.   See e-flow sheets for further details.     Net UF : 746 mL     Post tx CVC care: Right femoral non tunneled HD ports cleansed per protocol, flushed with NS, locked with Heparin 1ml/1000 unit, clamped and capped. CVC dressing assessed, CDI. Aspirate Heparin prior to next CVC use.     Report given to primary RNBrandi

## 2025-01-21 NOTE — CARE PLAN
Patient Seen in: [facility] Emergency Department      History   Patient presents with:  Covid    Stated Complaint: + COVID, sent for antibodies    HPI/Subjective:   HPI    Patient is a 29-year-old female who presents emergency department reporting a The patient is Unstable - High likelihood or risk of patient condition declining or worsening    Shift Goals  Clinical Goals: hemodynamic stability; pulmonary hygiene  Patient Goals: ASH; patient intubated  Family Goals: Updates    Progress made toward(s) clinical / shift goals:  Patient's blood pressure has been managed through continuous blood pressure medication. Patient has been provided with oral hygiene.       Problem: Pain - Standard  Goal: Alleviation of pain or a reduction in pain to the patient’s comfort goal  1/21/2025 0321 by Oj Carrillo R.N.  Outcome: Progressing  1/21/2025 0320 by KINSEY ChaseN.  Outcome: Progressing     Problem: Knowledge Deficit - Standard  Goal: Patient and family/care givers will demonstrate understanding of plan of care, disease process/condition, diagnostic tests and medications  1/21/2025 0321 by Oj Carrillo R.N.  Outcome: Progressing  1/21/2025 0320 by KINSEY ChaseN.  Outcome: Progressing     Problem: Fall Risk  Goal: Patient will remain free from falls  1/21/2025 0321 by Oj Carrillo R.N.  Outcome: Progressing  1/21/2025 0320 by Oj Carrillo ROrquideaN.  Outcome: Progressing     Problem: Skin Integrity  Goal: Skin integrity is maintained or improved  1/21/2025 0321 by Oj Carrillo R.N.  Outcome: Progressing  1/21/2025 0320 by KINSEY ChaseN.  Outcome: Progressing     Problem: Safety - Medical Restraint  Goal: Remains free of injury from restraints (Restraint for Interference with Medical Device)  1/21/2025 0321 by Oj Carrillo R.N.  Outcome: Progressing  1/21/2025 0320 by Oj Carrillo R.N.  Outcome: Progressing        motion. Neck supple. Cardiovascular: Normal rate, regular rhythm, normal heart sounds and intact distal pulses. Exam reveals no gallop and no friction rub. No murmur heard. Pulmonary/Chest: Effort normal. No respiratory distress. no wheezes.  no ral [address] [address]  [phone]    Schedule an appointment as soon as possible for a visit        The patient has evidence of COVID-19 pneumonia and low oxygen saturation. There is concern for gradual decline at home.  As a result, a pu

## 2025-01-21 NOTE — FLOWSHEET NOTE
01/21/25 0839   Spontaneous Breathing Trial (SBT)   Length of Weaning Trial (Hours) 2   Weaning Parameters   RR (bpm) 24   $ FVC / Vital Capacity (liters)  0.5   NIF (cm H2O)  -12   Rapid Shallow Breathing Index (RR/VT) 50   Spontaneous VE 11.2   Spontaneous

## 2025-01-21 NOTE — CARE PLAN
Problem: Ventilation  Goal: Ability to achieve and maintain unassisted ventilation or tolerate decreased levels of ventilator support  Description: Document on Vent flowsheet    1.  Support and monitor invasive and noninvasive mechanical ventilation  2.  Monitor ventilator weaning response  3.  Perform ventilator associated pneumonia prevention interventions  4.  Manage ventilation therapy by monitoring diagnostic test results  Outcome: Not Met     Ventilator Daily Summary    Vent Day # 1  Airway: 8@25    Ventilator settings: 18 410 8 50%  Weaning trials:   Respiratory Procedures:     Plan: Continue current ventilator settings and wean mechanical ventilation as tolerated per physician orders.

## 2025-01-21 NOTE — PROGRESS NOTES
Late entry-approx 1800 came into room, bp cuff not taking, pt ST 120s and then mult pauses, jaya to 47 with long pauses, ypotensive 40s systolic. Femoral pulse palpable then not, code blue called, uccelli in room, code team in room, epi x1, bicarb x1 calcium x1. A line placed, labs drawn and sent, cxr at bs, RT drawing blood gas. Family was in room and was asked to wait in lobby, SW spoke w/ family, family now in room and md aware, to come speak w/ family. as

## 2025-01-21 NOTE — PROCEDURES
DATE OF OPERATION:   1/20/2025     PREOPERATIVE DIAGNOSIS: increased oxygen requirement    POSTOPERATIVE DIAGNOSIS: increased oxygen requirement    PROCEDURE PERFORMED: Diagnostic flexible fiberoptic bronchoscopy with bronchoalveolar lavage.    SURGEON:   Hua Bran M.D.    ANESTHESIA:  Anesthesia consisting of intravenous sedation was administered.    INDICATIONS: The patient is a 60 year-old  man with acute respiratory failure, increased oxygen requirement. Diagnostic flexible fiberoptic bronchoscopy with bronchoalveolar lavage is performed in the ICU.    FINDINGS:  Normal anatomy.  Purulent Secretions: bilateral lungs.    SPECIMEN:  Bronchoalveolar lavage for quantitative culture. BAL location bilateral lungs.    PROCEDURE: Followingemergent   family verbal   consent, the patient was properly identified and optimally positioned in bed. He was preoxygenated with 100% oxygen and placed on a regular ventilatory rate. Anesthesia consisting of intravenous sedation was administered. A timeout was conducted with the full attention and participation of all procedure personnel.    The fiberoptic bronchoscope was advanced through the tracheostomy tube. The airways were inspected with findings detailed above.      The patient tolerated the procedure well. There were no apparent complications.         ____________________________________     Hua Bran M.D.    DD: 1/20/2025  4:46 PM

## 2025-01-21 NOTE — ASSESSMENT & PLAN NOTE
1/20 emergently intubated in ICU (ketamine/nita).  Continue full mechanical ventilatory support.   Ventilator bundle and Trauma weaning protocol

## 2025-01-21 NOTE — PROGRESS NOTES
Nephrology Daily Progress Note    Date of Service  1/21/2025    Chief Complaint  60 y.o. male with a history of anuric ESRD on hemodialysis Monday Wednesday Friday, diabetes, hypertension admitted 1/14/2025 with ground-level fall, with rib fractures after falling in the shower, complicated by right hemothorax    Interval Problem Update  1/17 -patient had dialysis yesterday with 1.5 liters removed.  Discussed with patient dialysis again today to get him back on Monday Wednesday Friday schedule.  Patient complains of right sided chest/back pain where the thoracostomy tube is.  Denies shortness of breath.  1/18-patient had dialysis yesterday with 2 L removed.  Denies chest pain, shortness of breath.  1/19 -patient had a larger chest tube today.  Complains of some soreness on his right chest.  Denies shortness of breath.  1/20 patient is hypotensive this morning, encephalopathic  We tried hemodialysis but his left upper arm AV fistula was thrombosed  1/21 events last 24 hours noted  Patient had hypotension, cardiac arrest, had to be intubated    Review of Systems  Review of Systems   Unable to perform ROS: Intubated        Physical Exam  Temp:  [36.1 °C (97 °F)-36.7 °C (98 °F)] 36.3 °C (97.4 °F)  Pulse:  [] 110  Resp:  [17-61] 20  BP: ()/(22-94) 100/56  SpO2:  [70 %-100 %] 94 %    Physical Exam  Vitals and nursing note reviewed.   Constitutional:       Appearance: He is ill-appearing.      Interventions: He is sedated and intubated.   HENT:      Head: Normocephalic and atraumatic.      Right Ear: External ear normal.      Left Ear: External ear normal.      Nose: Nose normal.      Mouth/Throat:      Pharynx: No oropharyngeal exudate or posterior oropharyngeal erythema.      Comments: ETT  Eyes:      General:         Right eye: No discharge.         Left eye: No discharge.      Conjunctiva/sclera: Conjunctivae normal.   Cardiovascular:      Rate and Rhythm: Normal rate and regular rhythm.   Pulmonary:       "Effort: Respiratory distress present. He is intubated.      Breath sounds: No wheezing.      Comments: Coarse BS  Abdominal:      General: Abdomen is flat. Bowel sounds are normal.   Musculoskeletal:      Cervical back: No rigidity. No muscular tenderness.      Right lower leg: Edema present.      Left lower leg: Edema present.   Skin:     General: Skin is warm and dry.      Coloration: Skin is not jaundiced.   Neurological:      Mental Status: He is unresponsive.   Psychiatric:         Behavior: Behavior normal.     Dialysis access: Left upper arm AV fistula with patent bruit and thrill    Fluids    Intake/Output Summary (Last 24 hours) at 1/21/2025 1318  Last data filed at 1/21/2025 1108  Gross per 24 hour   Intake 4480.5 ml   Output 1396 ml   Net 3084.5 ml       Laboratory  Labs reviewed, pertinent labs below.  Recent Labs     01/20/25 0416 01/20/25  1820 01/21/25  0405   WBC 2.5* 0.8* 0.8*   RBC 3.47* 3.63* 3.40*   HEMOGLOBIN 9.4* 10.3* 9.0*   HEMATOCRIT 30.2* 31.8* 28.9*   MCV 87.0 87.6 85.0   MCH 27.1 28.4 26.5*   MCHC 31.1* 32.4 31.1*   RDW 54.7* 55.5* 53.1*   PLATELETCT 112* 81* 51*   MPV 9.8 10.1 11.5     Recent Labs     01/20/25  0416 01/20/25  1820 01/21/25  0405   SODIUM 138 139 132*   POTASSIUM 3.6 3.4* 3.7   CHLORIDE 96 98 96   CO2 29 25 20   GLUCOSE 78 206* 88   BUN 43* 27* 32*   CREATININE 5.33* 4.16* 4.34*   CALCIUM 8.4* 9.3 8.0*     Recent Labs     01/20/25  1820   APTT 51.9*   INR 1.77*             URINALYSIS:  Lab Results   Component Value Date/Time    COLORURINE Yellow 05/10/2009 0740    CLARITY Clear 05/10/2009 0740    SPECGRAVITY 1.015 05/10/2009 0740    PHURINE 6.0 05/10/2009 0740    KETONES 40 (A) 05/10/2009 0740    PROTEINURIN Negative 05/10/2009 0740    BILIRUBINUR Negative 05/10/2009 0740    NITRITE Negative 05/10/2009 0740    LEUKESTERAS Negative 05/10/2009 0740    OCCULTBLOOD Negative 05/10/2009 0740     UPC  No results found for: \"TOTPROTUR\" No results found for: " "\"CREATININEU\"      Imaging interpreted by radiologist. Imaging reports reviewed with pertinent findings below  DX-CHEST-PORTABLE (1 VIEW)   Final Result         1.  Mild pulmonary edema and/or infiltrates slightly decreased right lung base compared to prior study.   2.  Small right and trace left pleural effusion, stable.   3.  Cardiomegaly   4.  Atherosclerosis      DX-CHEST-PORTABLE (1 VIEW)   Final Result      1.  Slight increased size of RIGHT pleural effusion.   2.  No other significant change from prior exam.         DX-ABDOMEN FOR TUBE PLACEMENT   Final Result      NG tube extends below the diaphragm with tip at the level of the gastric body.      DX-CHEST-PORTABLE (1 VIEW)   Final Result         1.  Ill-defined opacifications in each lung have increased compared to the prior radiograph.  This could indicate worsening of pulmonary edema or inflammation.      2.  There is some increase in opacification in the right mid and lower lung.      3.  Endotracheal tube tip is between clavicles and gonzalo.      4.  Right-sided chest tube is again noted.      DX-CHEST-PORTABLE (1 VIEW)   Final Result      1.  Small right pleural effusion.   2.  Increased right lower lung pneumonia.   3.  No appreciable right pneumothorax. Right thoracostomy tube in place.      DX-CHEST-PORTABLE (1 VIEW)   Final Result         1.  Mild pulmonary edema and/or infiltrates increasing right lung base compared to prior study.   2.  Small right and trace left pleural effusion, stable.   3.  Right pneumothorax, appears slightly increased since prior study   4.  Cardiomegaly   5.  Atherosclerosis      DX-CHEST-PORTABLE (1 VIEW)   Final Result         1.  Mild pulmonary edema and/or infiltrates.   2.  Small right and trace left pleural effusion.   3.  Right pneumothorax, appears slightly increased since prior study   4.  Cardiomegaly   5.  Atherosclerosis      DX-CHEST-PORTABLE (1 VIEW)   Final Result      Resolution of the right pneumothorax " status post new large-bore right-sided chest tube placement.      DX-CHEST-PORTABLE (1 VIEW)   Final Result         1. Slight increase in right pneumothorax since yesterday. Kink in right pleural pigtail drain noted.      2. Right rib fractures. Sternal wires.      DX-CHEST-PORTABLE (1 VIEW)   Final Result      Interval increase in size of the right-sided pneumothorax.      DX-CHEST-PORTABLE (1 VIEW)   Final Result         1. Slight increase in 3 cm right pneumothorax. Right pleural pigtail drain remains.      2. Right rib fractures.      3. Mild infiltrates. Trace left effusion. Sternal wires.                     DX-CHEST-PORTABLE (1 VIEW)   Final Result         1.  No acute cardiopulmonary disease.   2.  Right pneumothorax, appears slightly increased since prior study   3.  Cardiomegaly   4.  Atherosclerosis      DX-CHEST-PORTABLE (1 VIEW)   Final Result         1.  No acute cardiopulmonary disease.   2.  Right lung base pneumothorax, appears slightly increased since prior study   3.  Atherosclerosis      DX-CHEST-PORTABLE (1 VIEW)   Final Result      1.  Interval placement of a right-sided small bore thoracostomy tube.   2.  No significant residual right sided effusion.   3.  Small right-sided pneumothorax.      IR-CHEST TUBE-EMPYEMA RIGHT   Final Result      Successful US GUIDED right  CHEST TUBE PLACEMENT.      DX-CHEST-PORTABLE (1 VIEW)   Final Result         1.  Scattered hazy bilateral pulmonary edema and/or infiltrates, greater on the right, similar to prior study   2.  Moderate to large layering right pleural effusion, stable   3.  Atherosclerosis      DX-CHEST-LIMITED (1 VIEW)   Final Result         Moderate right pleural effusion with patchy right lung opacity, worse than prior.      US-THORACENTESIS PUNCTURE RIGHT   Final Result      1. Ultrasound guided right sided therapeutic thoracentesis.      2. 2000 mL of fluid withdrawn.      DX-CHEST-PORTABLE (1 VIEW)   Final Result      No evidence of  right-sided pneumothorax status post thoracentesis. Moderate residual right-sided pleural fluid.      DX-CHEST-PORTABLE (1 VIEW)   Final Result         1.  Scattered hazy bilateral pulmonary edema and/or infiltrates are   2.  Moderate to large layering right pleural effusion   3.  Atherosclerosis      CT-CHEST (THORAX) W/O   Final Result         1.  Right posterior eighth through 11th rib fractures, with flail segment of the ninth rib.   2.  Large layering right pleural effusion   3.  Linear consolidations in the right lung base favoring atelectasis, component of infiltrate not excluded.   4.  Atherosclerosis and atherosclerotic coronary artery disease   5.  Irregular hepatic contour favoring changes of cirrhosis      DX-PELVIS-1 OR 2 VIEWS   Final Result         1.  No acute traumatic bony injury.   2.  Atherosclerosis      [id number] (WITH 1-VIEW CXR) RIGHT   Final Result         1.  Right lateral eighth rib fracture and posterior right 10th and 11th rib fractures   2.  Segmental right ninth rib fracture, compatible with component of flail chest.   3.  Moderate to large layering right pleural effusion   4.  Scattered patchy bilateral pulmonary infiltrates   5.  Atherosclerosis            Current Facility-Administered Medications   Medication Dose Route Frequency Provider Last Rate Last Admin    loperamide (Imodium) capsule 2 mg  2 mg Enteral Tube Q8HRS Edward Chacon M.D.   2 mg at 01/21/25 1112    haloperidol lactate (Haldol) injection 1 mg  1 mg Intravenous Q4HRS PRN Edward Chacon M.D.        Respiratory Therapy Consult   Nebulization Continuous RT Edward Chacon M.D.        Pharmacy Consult: Enteral tube insertion - review meds/change route/product selection  1 Each Other PHARMACY TO DOSE Edward Chacon M.D.        norepinephrine (Levophed) 8 mg in 250 mL NS infusion (premix)  0-1 mcg/kg/min (Ideal) Intravenous Continuous Hua Bran M.D. 23.1 mL/hr at 01/21/25 1033 0.18 mcg/kg/min at  01/21/25 1033    dextrose 10% infusion   Intravenous Continuous Meghan Vazquez A.POrquideaNOrquidea 75 mL/hr at 01/21/25 0916 Rate Change at 01/21/25 0916    cefepime (Maxipime) 1 g in  mL IVPB  1 g Intravenous Q24HRS Hua Bran M.D.   Stopped at 01/20/25 1417    heparin intracatheter (for DIALYSIS USE ONLY) 1,400 Units  1,400 Units Intracatheter DIALYSIS PRN Fadi Najjar, M.D.   1,400 Units at 01/20/25 1624    heparin intracatheter (for DIALYSIS USE ONLY) 1,400 Units  1,400 Units Intracatheter DIALYSIS PRN Fadi Najjar, M.D.   1,400 Units at 01/20/25 1624    gabapentin (Neurontin) capsule 100 mg  100 mg Enteral Tube BID Hau Bran M.D.   100 mg at 01/21/25 0535    lansoprazole (Prevacid) solutab 30 mg  30 mg Enteral Tube DAILY Hua Bran M.D.   30 mg at 01/21/25 0535    ondansetron (Zofran ODT) dispertab 4 mg  4 mg Enteral Tube Q4HRS PRN Hua Bran M.D.        oxyCODONE immediate-release (Roxicodone) tablet 5 mg  5 mg Enteral Tube Q3HRS PRN Hua Bran M.D.   5 mg at 01/21/25 0244    Or    oxyCODONE immediate release (Roxicodone) tablet 10 mg  10 mg Enteral Tube Q3HRS PRN Hua Bran M.D.        acetaminophen (Tylenol) tablet 650 mg  650 mg Enteral Tube 4X/DAY Hua Bran M.D.        dexmedetomidine (Precedex) 400 mcg/100mL infusion  0.1-1.5 mcg/kg/hr Intravenous Continuous Hua Bran M.D. 5.1 mL/hr at 01/21/25 0800 0.4 mcg/kg/hr at 01/21/25 0800    epoetin (Retacrit) injection 3,000 Units  3,000 Units Intravenous MO, WE + FR Wander Gerardo M.D.   3,000 Units at 01/20/25 1436    NS (Bolus) 0.9 % infusion 100 mL  100 mL Intravenous DIALYSIS PRN Catarina Calvo M.D.        HYDROmorphone (Dilaudid) injection 0.5 mg  0.5 mg Intravenous Q3HRS PRN Lynne Milligan P.A.-C.   0.5 mg at 01/20/25 0608    Pharmacy Consult Request ...Pain Management Review 1 Each  1 Each Other PHARMACY TO DOSE Edward Chacon M.D.        ondansetron (Zofran) syringe/vial injection 4 mg  4 mg  Intravenous Q4HRS PRN Edward Chacon M.D.   4 mg at 01/20/25 0942    bisacodyl (Dulcolax) suppository 10 mg  10 mg Rectal Q24HRS PRGAURAV Chacon M.D.        [Held by provider] heparin injection 5,000 Units  5,000 Units Subcutaneous Q8HRS Edward Chacon M.D.        lidocaine (Asperflex) 4 % patch 1 Patch  1 Patch Transdermal Q24HR Edward Chacon M.D.   1 Patch at 01/20/25 2301    [Held by provider] methocarbamol (Robaxin) tablet 750 mg  750 mg Oral 4X/DAY Edward Chacon M.D.   750 mg at 01/15/25 1250    hydrALAZINE (Apresoline) injection 10-20 mg  10-20 mg Intravenous Q4HRS RAJ Chacon M.D.   20 mg at 01/17/25 0909    labetalol (Normodyne/Trandate) injection 10-20 mg  10-20 mg Intravenous Q4HRS PRGAURAV Chacon M.D.   10 mg at 01/19/25 1544         Assessment/Plan  60 y.o. male with a history of anuric ESRD on hemodialysis Monday Wednesday Friday, diabetes, hypertension admitted 1/14/2025 with ground-level fall, with rib fractures after falling in the shower, complicated by right hemothorax    1.  ESRD on hemodialysis Monday Wednesday Friday.    2.  Dialysis access: Left upper arm AV fistula, thrombosed  3.  Ground-level fall and right hemothorax,  4.  Anemia of chronic disease.  5.  Hypotension.  6.  VDRF  no acute need for HD today, continue to evaluate daily  JEANINE with dialysis  Renal diet  Daily BMP, CBC.  Renal dose all meds  Avoid nephrotoxins like NSAIDs.  Prognosis guarded.    Plan discussed with Dr. Chacon

## 2025-01-21 NOTE — PROGRESS NOTES
Responded to code blue event.  Per report patient had become increasingly tachycardic since intubation and then had a long pause and symptomatic bradycardia.  Heart rate was in the 40s and systolic blood pressure was undetectable at the time code was called.  He received one round of CPR, Epi x1, Bicarb, and calcium.  On first pulse check his telemetry was showed sinus rhythm and he had regained his pulse   Cuff pressures were reading in the 170 systolic range after ROSC.  An A line was placed and systolics were 50 units higher than cuff.  Levophed titrated down using A line reading to decrease beta stimulation to the heart.  Full lab panel sent, will follow up and correct any electrolyte derangement.  EKG obtained and interpreted.  CXR obtained after ROSC, without a pneumothorax or tension physiology identified.    Family brought back to room at 1910, Updated on events and broached discussion about code status and further CPR tonight.  They plan to discuss among themselves and will reach out with questions and/or decisions.  He remains full code at this time.      Dalton Carolina MD

## 2025-01-21 NOTE — DIETARY
"Nutrition Services: Initial Assessment     Day 6 of admit. Zack Gale is 60 y.o., male with admitting DX of Trauma.    Consult Received for: EN    Current Hospital Problems List:    Principal Problem:    Trauma (POA: Yes)  Active Problems:    Coronary artery disease involving coronary bypass graft of native heart without angina pectoris (Chronic) (POA: Yes)    ESRD needing dialysis (HCC) (POA: Yes)    Essential (primary) hypertension (Chronic) (POA: Yes)    Hypoglycemia (POA: Yes)    Acute respiratory failure with hypoxia (HCC) (POA: Yes)    Closed fracture of four ribs, initial encounter (POA: Yes)    Pleural effusion on right (POA: Yes)    No contraindication to deep vein thrombosis (DVT) prophylaxis (POA: Yes)    Adrenal insufficiency (HCC) (POA: Yes)    Malnutrition (HCC) (POA: Unknown)      Overview: BMI 17      Reports chronic diarrhea    Pneumonia of both lungs due to infectious organism (POA: Yes)  Resolved Problems:    * No resolved hospital problems. *    Nutrition Assessment:      Height: 172.7 cm (5' 8\")  Weight: 48 kg (105 lb 13.1 oz)  Weight taken via: Bed Scale  BMI Calculated: 16.09  BMI Classification: Underweight       Weight Readings from Last 5 encounters:   Wt Readings from Last 5 Encounters:   25 48 kg (105 lb 13.1 oz)   10/10/24 53 kg (116 lb 13.5 oz)   24 51.7 kg (113 lb 15.7 oz)   24 53.8 kg (118 lb 9.7 oz)   23 57.6 kg (127 lb)       Calculation Equation: PSU (VE 7.9 L/min, Tmax x 24 hours 36.7 C) = 1385 kcal, REE with MSJ 1275 x 1.2 = 1530 kcal  Total Calories / day: 1385 - 1550 Calories / k.8 - 32.2   Total Grams Protein / day:  57 - 72 Grams Protein / k.2 - 1.5      Objective:   Pertinent Medical Hx: Pt intubated .  Indication for Nutrition Support: Intubation  Enteral Access:  OG in stomach per xray  Pertinent Labs: : Na 132 (L), K+ 3.7, Glu 88, BUN 32 (H), Creat 4.34 (H). 1/20: Alb 2.8 (L), Phos 3.5, Mg 1.9  Pertinent Meds: " Precedex, Levophed at 0.18 mcg/kg/min, IV D10 at 75 ml/hour, Cefepime, Imodium  Skin/Wounds:  No pressure injuries per chart review  Food Allergies: None known  Last BM:  Type 7: Watery, no solid pieces-entirely liquid  01/20/25   Formula based on RD Eval:  Fiber free Novasource Renal appropriate for pt with ESRD requiring pressor.      Current Diet Order/Intake:   NPO    Nutrition Diagnosis:      Inadequate Oral Intake related to pt intubated as evidenced by pt NPO with need for tube feeding.      Severe Malnutrition in context of Chronic Illness related to ESRD as evidenced by severe fat and muscle loss, reported poor intake estimated <75% of needs for >1 month.  Dx Status: Ongoing    Nutrition Interventions:      Initiate Novasource Renal at 10 ml/hr continuous. Do not advance without MD order.  When able to advance tube feed per MD, advance slowly by 10 ml every 12 hours to goal rate of 30 ml/hr.  Goal tube feed volume provides 1440 kcals, 65 g protein, and 518 ml free water daily.   Additional fluids per MD/DO.  Patient aware of active plan of care as appropriate.       Nutrition Monitoring and Evaluation:      Monitor nutrition POC, goal for >85% nutrition needs met via tube feeding.  Monitor vital signs pertinent to nutrition.    RD following and will provide updated recommendations as indicated.      Selina Angel R.D.                                         [facility]/AND CRITERIA FOR MALNUTRITION

## 2025-01-21 NOTE — PROCEDURES
DATE OF OPERATION:  1/20/2025    PREOPERATIVE DIAGNOSIS:  Acute respiratory failure,     POSTOPERATIVE DIAGNOSIS: Acute respiratory failure,    PROCEDURE PERFORMED:  Rapid sequence endotracheal intubation.    SURGEON:   Hua Bran M.D.    INDICATIONS: The patient is a 60 year-old man with Acute respiratory failure,.  Rapid sequence endotracheal intubation is carried out at the bedside to secure a definitve airway.     PROCEDURE: Following informed consent verbal consent family, the patient was properly identified and optimally positioned in bed. He was preoxygenated with 100% oxygen. A rapid sequence induction consisting of rocuronium and ketamine was administered intravenously.    The vocal cords were visualized transorally with a [place] Scope. A cuffed 8 endotracheal tube was passed and the cuff inflated. End tidal CO2 monitoring confirmed return of carbon dioxide. The tube was secured.     The patient tolerated the procedure well. There were no apparent complications.         ____________________________________     Hua Bran M.D.    DD: 1/20/2025  4:43 PM

## 2025-01-21 NOTE — PROCEDURES
DATE OF OPERATION:  1/20/2025    PREOPERATIVE DIAGNOSIS:  septic shock.     PROCEDURE PERFORMED: Right radial artery catheter placement.    SURGEON:   Dalton Carolina M.D.     INDICATIONS: The patient is a 60 year-old elderly man with septic shock. A radial arterial line is placed at the bedside.     PROCEDURE: Following implied emergent consent consent, the patient was properly identified and optimally positioned. Intravenous sedation and analgesia was administered. The procedural site was prepped with ChloraPrep® and draped in a standard fashion. Full barrier precautions were employed. The right radial artery was accessed percutaneously and a wire was advanced without resistance. A single lumen arterial catheter was passed using sterile Seldinger technique. The flexible guide wire was removed intact. The catheter was connected to the invasive hemodynamic monitoring apparatus. A satisfactory arterial waveform was observed. The catheter was secured to the skin with silk sutures.  A sterile dressing was applied. The patient tolerated the procedure well. There were no apparent complications.        ____________________________________     Dalton Carolina M.D.    DD: 1/20/2025  6:48 PM

## 2025-01-21 NOTE — ASSESSMENT & PLAN NOTE
1/20 Increased oxygen demand and purulent secretions on bronchoscopy.  Blood cultures negative  BAL grew a pan sensitive E. Coli, de-escalated to cefazolin to complete 7 days of treatment

## 2025-01-22 NOTE — CARE PLAN
The patient is Watcher - Medium risk of patient condition declining or worsening    Shift Goals  Clinical Goals: Wean pressors; hemodynamic stability  Patient Goals: ASH  Family Goals: updates; pt comfort    Problem: Pain - Standard  Goal: Alleviation of pain or a reduction in pain to the patient’s comfort goal  Outcome: Progressing     Problem: Knowledge Deficit - Standard  Goal: Patient and family/care givers will demonstrate understanding of plan of care, disease process/condition, diagnostic tests and medications  Outcome: Progressing     Problem: Fall Risk  Goal: Patient will remain free from falls  Outcome: Progressing     Problem: Skin Integrity  Goal: Skin integrity is maintained or improved  Outcome: Progressing     Problem: Safety - Medical Restraint  Goal: Remains free of injury from restraints (Restraint for Interference with Medical Device)  Outcome: Progressing  Goal: Free from restraint(s) (Restraint for Interference with Medical Device)  Outcome: Progressing     Progress made toward(s) clinical / shift goals:        Patient is not progressing towards the following goals:

## 2025-01-22 NOTE — DOCUMENTATION QUERY
[facility]                                                                       Query Response Note      PATIENT:               MEGHANA PRUETT  ACCT #:                  [id number]  MRN:                     [id number]  :                      1965  ADMIT DATE:       2025 7:55 PM  DISCH DATE:          RESPONDING  PROVIDER #:        [id number]           QUERY TEXT:    BMI <19 is documented in the Medical Record.      Can a diagnosis be provided to support this finding?    *Note:  If you agree with a diagnosis provided, please remember to include it in your daily concurrent documentation and onto the Discharge Summary    The patient's Clinical Indicators include:  Findings:  --Patient with ESRD on HD at baseline admitted s/p slip and fall in shower for right pleural effusion, multiple right rib fractures,     DM2 with hypoglycemia  --BMI:  17.77, weight:  53kg, height:  1.727m per Epic chart review  --Per PN 01/15 General:  He is awake. He is not in acute distress. Normal appearance, he is not ill-appearing, toxic-appearing or     diaphoretic    Treatment:  --Weight/height/BMI calculations  --Currently NPO    Risk Factors:  --ESRD on HD  --DM2 with hypoglycemia  --Bradycardia    Thank you,  Abby Esparza BSN, RN  Clinical   Connect via [employer]  Options provided:   -- Underweight   -- Cachexia   -- Finding of no clinical significance   -- Other explanation, (please specify other explanation)      Query created by: Abby Esparza on 2025 8:26 AM    RESPONSE TEXT:    Cachexia          Electronically signed by:  MEAGAN MADDOX MD 2025 10:45 AM

## 2025-01-22 NOTE — CARE PLAN
Problem: Ventilation  Goal: Ability to achieve and maintain unassisted ventilation or tolerate decreased levels of ventilator support  Description: Target End Date:  4 days     Document on Vent flowsheet    1.  Support and monitor invasive and noninvasive mechanical ventilation  2.  Monitor ventilator weaning response  3.  Perform ventilator associated pneumonia prevention interventions  4.  Manage ventilation therapy by monitoring diagnostic test results  Outcome: Progressing     Ventilator Daily Summary    Vent Day #3  Airway: 8.0 @ 25    Ventilator settings: APVcmv 20/410/+8,30%   Weaning trials: none  Respiratory Procedures:none     Plan: Continue current ventilator settings and wean mechanical ventilation as tolerated per physician orders.

## 2025-01-22 NOTE — PROGRESS NOTES
Trauma / Surgical Daily Progress Note    Date of Service  1/21/2025    Chief Complaint  60 y.o. male admitted 1/14/2025 with injury    Interval Events  Events overnight noted  Stable post-cardiac arrest with CPR  Discussed with Nephrology  NG to be placed, commence tube feeds  Ongoing issues with hypoglycemia, treated with dextrose infusion  Loperamide added to mitigate diarrhea  Remains critically ill requiring an infusion of norepinephrine for hemodynamic support  Family updated during rounds    Vital Signs for last 24 hours  Temp:  [36.1 °C (97 °F)-36.7 °C (98 °F)] 36.3 °C (97.4 °F)  Pulse:  [] 112  Resp:  [18-28] 27  BP: ()/(30-74) 87/30  SpO2:  [91 %-100 %] 100 %    Hemodynamic parameters for last 24 hours       Respiratory Data     Respiration: (!) 27, Pulse Oximetry: 100 %     Work Of Breathing / Effort: Vented  RUL Breath Sounds: Clear, RML Breath Sounds: Clear, RLL Breath Sounds: Diminished, DEVIN Breath Sounds: Clear, LLL Breath Sounds: Diminished    Physical Exam  Physical Exam  Vitals and nursing note reviewed.   Constitutional:       Interventions: He is sedated, intubated and restrained.   HENT:      Head: Normocephalic.   Eyes:      General: Lids are normal.      Conjunctiva/sclera: Conjunctivae normal.      Pupils: Pupils are equal, round, and reactive to light.   Neck:      Trachea: Trachea normal.   Cardiovascular:      Rate and Rhythm: Normal rate and regular rhythm.      Pulses: No decreased pulses.   Pulmonary:      Effort: Pulmonary effort is normal. He is intubated.      Breath sounds: Normal breath sounds.   Chest:      Comments: Right chest tube in place with serosang output  Abdominal:      General: Abdomen is flat. There is no distension.      Palpations: Abdomen is soft.      Tenderness: There is no abdominal tenderness.   Genitourinary:     Penis: Normal.    Musculoskeletal:      Cervical back: Neck supple.   Skin:     General: Skin is warm and dry.      Capillary Refill:  Capillary refill takes less than 2 seconds.   Neurological:      General: No focal deficit present.      Mental Status: He is lethargic.      GCS: GCS eye subscore is 2. GCS verbal subscore is 1. GCS motor subscore is 5.      Sensory: Sensation is intact.      Motor: Motor function is intact.         Laboratory  Recent Results (from the past 24 hours)   EKG    Collection Time: 25  9:52 PM   Result Value Ref Range    Report       [facility]    Test Date:  2025  Pt Name:    MEGHANA PRUETT             Department: [facility]  MRN:        [id number]                      [facility]  Gender:     Male                         Technician: [facility]  :        1965                   Requested By:GAYLA BLOUNT  Order #:    [id number]                    Reading MD: Deena Cannon    Measurements  Intervals                                Axis  Rate:       112                          P:          261  WY:         158                          QRS:        -92  QRSD:       145                          T:          -78  QT:         324  QTc:        443    Interpretive Statements  Ectopic atrial tachycardia, unifocal  Right bundle branch block  Inferior infarct, age indeterminate  ST elevation aVR and diffuse ST depression concerning for global ischemia,  possibly left main or proximal left anterior descending acute injury  Electronically Signed On 2025 21:52:19 PST by Deena Cannon     LACTIC ACID    Collection Time: 25 11:13 PM   Result Value Ref Range    Lactic Acid 4.9 (HH) 0.5 - 2.0 mmol/L   POCT glucose device results    Collection Time: 25 12:27 AM   Result Value Ref Range    POC Glucose, Blood 101 (H) 65 - 99 mg/dL   CBC WITH DIFFERENTIAL    Collection Time: 25  4:05 AM   Result Value Ref Range    WBC 0.8 (LL) 4.8 - 10.8 K/uL    RBC 3.40 (L) 4.70 - 6.10 M/uL    Hemoglobin 9.0 (L) 14.0 - 18.0 g/dL    Hematocrit 28.9 (L) 42.0 - 52.0 %    MCV 85.0 81.4 - 97.8 fL    MCH 26.5 (L) 27.0 -  33.0 pg    MCHC 31.1 (L) 32.3 - 36.5 g/dL    RDW 53.1 (H) 35.9 - 50.0 fL    Platelet Count 51 (L) 164 - 446 K/uL    MPV 11.5 9.0 - 12.9 fL    Neutrophils-Polys 56.60 44.00 - 72.00 %    Lymphocytes 20.10 (L) 22.00 - 41.00 %    Monocytes 4.30 0.00 - 13.40 %    Eosinophils 1.90 0.00 - 6.90 %    Basophils 0.40 0.00 - 1.80 %    Nucleated RBC 2.40 (H) 0.00 - 0.20 /100 WBC    Neutrophils (Absolute) 0.52 (L) 1.82 - 7.42 K/uL    Lymphs (Absolute) 0.16 (L) 1.00 - 4.80 K/uL    Monos (Absolute) 0.03 0.00 - 0.85 K/uL    Eos (Absolute) 0.02 0.00 - 0.51 K/uL    Baso (Absolute) 0.00 0.00 - 0.12 K/uL    NRBC (Absolute) 0.02 K/uL    Hypochromia 1+     Anisocytosis 1+     Macrocytosis 1+    Basic Metabolic Panel    Collection Time: 01/21/25  4:05 AM   Result Value Ref Range    Sodium 132 (L) 135 - 145 mmol/L    Potassium 3.7 3.6 - 5.5 mmol/L    Chloride 96 96 - 112 mmol/L    Co2 20 20 - 33 mmol/L    Glucose 88 65 - 99 mg/dL    Bun 32 (H) 8 - 22 mg/dL    Creatinine 4.34 (HH) 0.50 - 1.40 mg/dL    Calcium 8.0 (L) 8.5 - 10.5 mg/dL    Anion Gap 16.0 7.0 - 16.0   ESTIMATED GFR    Collection Time: 01/21/25  4:05 AM   Result Value Ref Range    GFR (CKD-EPI) 15 (A) >60 mL/min/1.73 m 2   DIFFERENTIAL MANUAL    Collection Time: 01/21/25  4:05 AM   Result Value Ref Range    Bands-Stabs 8.10 0.00 - 10.00 %    Metamyelocytes 7.40 %    Myelocytes 1.20 %    Manual Diff Status PERFORMED     Comment See Comment    PERIPHERAL SMEAR REVIEW    Collection Time: 01/21/25  4:05 AM   Result Value Ref Range    Peripheral Smear Review see below    PLATELET ESTIMATE    Collection Time: 01/21/25  4:05 AM   Result Value Ref Range    Plt Estimation Decreased    MORPHOLOGY    Collection Time: 01/21/25  4:05 AM   Result Value Ref Range    RBC Morphology Present     Poikilocytosis 2+     Target Cells 1+     Echinocytes 2+    IMMATURE PLT FRACTION    Collection Time: 01/21/25  4:05 AM   Result Value Ref Range    Imm. Plt Fraction 6.8 0.6 - 13.1 %   POCT glucose device  results    Collection Time: 01/21/25  6:29 AM   Result Value Ref Range    POC Glucose, Blood 67 65 - 99 mg/dL   POCT glucose device results    Collection Time: 01/21/25  7:25 AM   Result Value Ref Range    POC Glucose, Blood 81 65 - 99 mg/dL   POCT glucose device results    Collection Time: 01/21/25  3:13 PM   Result Value Ref Range    POC Glucose, Blood 92 65 - 99 mg/dL   POCT glucose device results    Collection Time: 01/21/25  6:37 PM   Result Value Ref Range    POC Glucose, Blood 68 65 - 99 mg/dL       Fluids    Intake/Output Summary (Last 24 hours) at 1/21/2025 2050  Last data filed at 1/21/2025 1800  Gross per 24 hour   Intake 2421.15 ml   Output 100 ml   Net 2321.15 ml       Core Measures & Quality Metrics  Core Measures & Quality Metrics    Assessment/Plan  * Trauma- (present on admission)  Assessment & Plan  Ground level fall.  Non Trauma Activation.  Edward Singleton MD. Trauma Surgery    Leukopenia- (present on admission)  Assessment & Plan  1/20 Increased oxygen demand and purulent secretions on bronchoscopy.  - Cefepime and Vancomycin.   Blood and respiratory cultures pending    Pleural effusion on right- (present on admission)  Assessment & Plan  CT shows very large effusion, unknown if only related to increase of previous effusion or has a hemothorax component.  1/15 Thoracentesis completed.  Residual pleural fluid collection.  1/16 IR chest tube placement, pleurevac filled and replaced.  1/19 IR drain replaced chest tube for persistent increasing pneumothorax  1/20 Chest Xray with no pneumothorax noted/ no air leak/300ml in collection chamber/continue suction.  Serial chest Xray    Acute respiratory failure with hypoxia (HCC)- (present on admission)  Assessment & Plan  1/20 emergently intubated in ICU (ketamine/nita).  Continue full mechanical ventilatory support.   Ventilator bundle and Trauma weaning protocol    Malnutrition (HCC)  Assessment & Plan  Nutritional therapies  Follow-up GI evaluation  recommendations    Adrenal insufficiency (HCC)- (present on admission)  Assessment & Plan  Cortisol level 13.5.  100 mg hydrocortisone IV, SBP improved.  1/16 50 mg hydrocortisone IV for 3 doses ordered.  Monitor response supplement as needed.    Closed fracture of four ribs, initial encounter- (present on admission)  Assessment & Plan  Right eighth, ninth, tenth, and eleventh rib fractures; flail segment of rib nine.  Aggressive pulmonary hygiene and multimodal pain management.    Hypoglycemia- (present on admission)  Assessment & Plan  Admission serum glucose 60.   Serial glucose monitoring.   Hypoglycemia protocol.   Monitor    Essential (primary) hypertension- (present on admission)  Assessment & Plan  Uncontrolled, SBP >200mmHg in ED.   Chronic condition treated with carvedilol and nifedipine.  Amlodipine initiated.  PRN antihypertensives    ESRD needing dialysis (HCC)- (present on admission)  Assessment & Plan  HD MWF  Nephrology consulted from ER  1/16 Hemodialysis  Dialysis per nephrology  Catarina Calvo M.D., Nephrology.    No contraindication to deep vein thrombosis (DVT) prophylaxis- (present on admission)  Assessment & Plan  Prophylactic dose heparin 5000 u q 8 hr initiated upon admission.    Coronary artery disease involving coronary bypass graft of native heart without angina pectoris- (present on admission)  Assessment & Plan  Chronic condition treated with aspirin.  Holding maintenance medication during acute traumatic illness.      I independently reviewed pertinent clinical lab tests from the last 48 hours and ordered additional follow up clinical lab tests.  I independently reviewed pertinent radiographic images and the radiologist's reports from the last 48 hours and ordered additional follow up radiographic studies.  The details of the available patient records in [facility] (including laboratory tests, culture data, medications, imaging, and other pertinent diagnostic tests) and documentation by  consulting physicians (when applicable) were reviewed, summated, and that information was utilized as warranted in today's medical decision making for this patient.  I personally evaluated the patient condition at bedside, discussed the daily plan(s) with available nursing staff, dieticians, social workers, pharmacists on multi-disciplinary rounds, and performed frequent reassessments through out the day as clinically warranted.  I evaluated the patient's condition at bedside.    The patient is critically ill with acute respiratory failure requiring Review of interval medical and surgical history, current medications and outpatient medication reconciliation, interval imaging studies and radiologist interpretation, and interval laboratory values.  Management of rib fractures, right pneumothorax, and right hemothorax.  Evaluation of culture data and targeted antimicrobial therapy. and integration of multiple data points and associated complex medical decision making, active ventilator management, and titration of vasopressors involving high complexity decision making and medically necessary care by providing frequent assessment, manipulation, and support of circulatory function, pulmonary function, and overwhelming infection to prevent further life-threatening deterioration of the patient's condition.     This patient requires continued ICU management and hospital admission.  The patient has impairment of one or more vital organ systems and a high probability of imminent or life-threatening deterioration in condition.     Aggregated critical care time spent evaluating, reassessing, reviewing documentation, providing care, and managing this patient exclusive of procedures: 75 minutes    Julian Chacon MD, FACS

## 2025-01-22 NOTE — CARE PLAN
Problem: Ventilation  Goal: Ability to achieve and maintain unassisted ventilation or tolerate decreased levels of ventilator support  Description: Target End Date:  4 days     Document on Vent flowsheet    1.  Support and monitor invasive and noninvasive mechanical ventilation  2.  Monitor ventilator weaning response  3.  Perform ventilator associated pneumonia prevention interventions  4.  Manage ventilation therapy by monitoring diagnostic test results  Outcome: Progressing  Note:   Ventilator Daily Summary    Vent Day # 2    Airway: 8.0 @ 25    Ventilator settings: APVCMV 20/410/+8/30%    Weaning trials: SBT x 2hr AM    Respiratory Procedures: none    Plan: Continue current ventilator settings and wean mechanical ventilation as tolerated per physician orders.

## 2025-01-22 NOTE — PROCEDURES
Vascular Access Team    Date of Insertion: 1/21/2025  Arm Circumference: n/a  Line Length: 10  Line Size: 20g  Vein Occupancy %: 33  Reason for Midline: Access  Labs: WBC 0.8, PLT 51, BUN 32, Cr 4.34, GFR 15, INR 1.77    Orders confirmed, vessel patency confirmed with ultrasound. Risks and benefits of procedure explained to patient and education regarding line associated bloodstream infections provided. Questions answered. VAT aware that renal labs out of range for placement, Dr. Chacon and Najjar both ok with midline at this time.      PowerGlide Midline placed in RUE per licensed provider order with ultrasound guidance. 20g, 10 cm line placed in Brachial vein after 1 attempt(s).  Catheter inserted with brisk blood return. Secured with 0 cm external from insertion site.  Line flushed without resistance with 10 mL 0.9% normal saline.  Midline secured with Biopatch and Tegaderm.     Midline placement is confirmed by nurse using ultrasound and ability to flush and draw blood. Midline is appropriate for use at this time.  No X-ray is needed for placement confirmation. Pt tolerated procedure well.  Patient condition relayed to unit RN or ordering physician via this post procedure note in the EMR.    Ultrasound images uploaded to PACS and viewable in the EMR - yes  Ultrasound imaged printed and placed in paper chart - no      BARD PowerGlide Midline ref # [id number], Lot # [id number], Expiration Date 04/30/2025

## 2025-01-22 NOTE — CARE PLAN
The patient is Unstable - High likelihood or risk of patient condition declining or worsening    Shift Goals  Clinical Goals: maintain or improve hemodynamic stability; pain control; dialysis; pulmonary hygiene; updates to family  Patient Goals: unable to assess at this time  Family Goals: updates; pt comfort and rest    Progress made toward(s) clinical / shift goals:    Problem: Pain - Standard  Goal: Alleviation of pain or a reduction in pain to the patient’s comfort goal  Outcome: Progressing     Problem: Knowledge Deficit - Standard  Goal: Patient and family/care givers will demonstrate understanding of plan of care, disease process/condition, diagnostic tests and medications  Outcome: Progressing     Problem: Fall Risk  Goal: Patient will remain free from falls  Outcome: Progressing     Problem: Skin Integrity  Goal: Skin integrity is maintained or improved  Outcome: Progressing     Problem: Safety - Medical Restraint  Goal: Remains free of injury from restraints (Restraint for Interference with Medical Device)  Outcome: Progressing  Goal: Free from restraint(s) (Restraint for Interference with Medical Device)  Outcome: Progressing

## 2025-01-22 NOTE — PROGRESS NOTES
[facility] Progress Note         HD today x 3 hours per Dr. Najjar.  Tx initiated at 1053 and ended at 1354    Pt is intubated and sedated, on levophed for blood pressure support, with generalized edema, and tachycardia (HR>100 bpm). Right femoral CDI. (+) consent     NET UF: 354 ml    Tx tolerated but UFG was not met d/t hypotension.Saline bolus was given, 200 ml in total. UFG was modified and levophed was titrated to help blood pressure go up. Dr. Najjar was informed. Blood returned, ports flushed with NS. Heparin 1000 units/ml used to lock catheter per designated amount. CVC ports clamped and capped. Aspirate heparin prior to next CVC use. See eflow sheets for further details.    Report given to IRIS Garcia RN

## 2025-01-22 NOTE — DISCHARGE PLANNING
Case Management Discharge Planning    Admission Date: 1/14/2025  GMLOS: 4.4  ALOS: 6    6-Clicks ADL Score: 22  6-Clicks Mobility Score: 18    Anticipated Discharge Dispo: Discharge Disposition: Discharged to home/self care (01)  Discharge Address: [address] [place] [address] [place]hir [place] [address]  Discharge Contact Phone Number: [phone]    DME Needed: No    Action(s) Taken: OTHER  Case discussed, patient had hypotension, cardiac arrest, had to be intubated.    Escalations Completed: None    Medically Clear: No    Next Steps: CM to follow with needs and barriers to discharge.    Barriers to Discharge: Medical clearance    Is the patient up for discharge tomorrow: No

## 2025-01-22 NOTE — CARE PLAN
The patient is Watcher - Medium risk of patient condition declining or worsening    Shift Goals  Clinical Goals: maintain or improve hemodynamics; respiratory status; pain control; nutrition  Patient Goals: unable to assess at this time  Family Goals: updates; speak with MD    Progress made toward(s) clinical / shift goals:    Problem: Pain - Standard  Goal: Alleviation of pain or a reduction in pain to the patient’s comfort goal  Outcome: Progressing     Problem: Safety - Medical Restraint  Goal: Remains free of injury from restraints (Restraint for Interference with Medical Device)  Outcome: Progressing  Goal: Free from restraint(s) (Restraint for Interference with Medical Device)  Outcome: Progressing

## 2025-01-22 NOTE — PROGRESS NOTES
Trauma / Surgical Daily Progress Note    Date of Service  1/22/2025    Chief Complaint  60 y.o. male admitted 1/14/2025 with injury    Interval Events  Hemodialysis planned for today, tolerated about 3 hours, but had issues with hypotension  Tube feeds to goal, but still treating ongoing severe hypoglycemia with dextrose infusion  Loperamide helping with diarrhea  Remains critically ill requiring an infusion of norepinephrine for hemodynamic support  Family updated during rounds    Vital Signs for last 24 hours  Temp:  [36.3 °C (97.4 °F)-38.3 °C (101 °F)] 38.3 °C (101 °F)  Pulse:  [106-140] 106  Resp:  [20-55] 24  BP: ()/(30-86) 100/42  SpO2:  [92 %-100 %] 98 %    Hemodynamic parameters for last 24 hours       Respiratory Data     Respiration: (!) 24, Pulse Oximetry: 98 %     Work Of Breathing / Effort: Vented  RUL Breath Sounds: Clear, RML Breath Sounds: Clear, RLL Breath Sounds: Diminished, DEVIN Breath Sounds: Clear, LLL Breath Sounds: Diminished    Physical Exam  Physical Exam  Vitals and nursing note reviewed.   Constitutional:       Interventions: He is sedated, intubated and restrained.   HENT:      Head: Normocephalic.   Eyes:      General: Lids are normal.      Conjunctiva/sclera: Conjunctivae normal.      Pupils: Pupils are equal, round, and reactive to light.   Neck:      Trachea: Trachea normal.   Cardiovascular:      Rate and Rhythm: Normal rate and regular rhythm.      Pulses: No decreased pulses.   Pulmonary:      Effort: Pulmonary effort is normal. He is intubated.      Breath sounds: Normal breath sounds.   Chest:      Comments: Right chest tube in place with serosang output  Abdominal:      General: Abdomen is flat. There is no distension.      Palpations: Abdomen is soft.      Tenderness: There is no abdominal tenderness.   Genitourinary:     Penis: Normal.    Musculoskeletal:      Cervical back: Neck supple.   Skin:     General: Skin is warm and dry.      Capillary Refill: Capillary refill  takes less than 2 seconds.   Neurological:      General: No focal deficit present.      Mental Status: He is lethargic.      GCS: GCS eye subscore is 2. GCS verbal subscore is 1. GCS motor subscore is 5.      Sensory: Sensation is intact.      Motor: Motor function is intact.         Laboratory  Recent Results (from the past 24 hours)   POCT glucose device results    Collection Time: 01/21/25  6:37 PM   Result Value Ref Range    POC Glucose, Blood 68 65 - 99 mg/dL   POCT glucose device results    Collection Time: 01/22/25 12:00 AM   Result Value Ref Range    POC Glucose, Blood 81 65 - 99 mg/dL   CBC WITH DIFFERENTIAL    Collection Time: 01/22/25  5:55 AM   Result Value Ref Range    WBC 4.9 4.8 - 10.8 K/uL    RBC 3.32 (L) 4.70 - 6.10 M/uL    Hemoglobin 8.8 (L) 14.0 - 18.0 g/dL    Hematocrit 27.2 (L) 42.0 - 52.0 %    MCV 81.9 81.4 - 97.8 fL    MCH 26.5 (L) 27.0 - 33.0 pg    MCHC 32.4 32.3 - 36.5 g/dL    RDW 52.1 (H) 35.9 - 50.0 fL    Platelet Count 25 (L) 164 - 446 K/uL    Neutrophils-Polys 72.00 44.00 - 72.00 %    Lymphocytes 8.50 (L) 22.00 - 41.00 %    Monocytes 3.40 0.00 - 13.40 %    Eosinophils 0.00 0.00 - 6.90 %    Basophils 0.00 0.00 - 1.80 %    Nucleated RBC 0.60 (H) 0.00 - 0.20 /100 WBC    Neutrophils (Absolute) 3.94 1.82 - 7.42 K/uL    Lymphs (Absolute) 0.42 (L) 1.00 - 4.80 K/uL    Monos (Absolute) 0.17 0.00 - 0.85 K/uL    Eos (Absolute) 0.00 0.00 - 0.51 K/uL    Baso (Absolute) 0.00 0.00 - 0.12 K/uL    NRBC (Absolute) 0.03 K/uL    Hypochromia 1+     Anisocytosis 1+     Macrocytosis 1+    Basic Metabolic Panel    Collection Time: 01/22/25  5:55 AM   Result Value Ref Range    Sodium 130 (L) 135 - 145 mmol/L    Potassium 4.2 3.6 - 5.5 mmol/L    Chloride 93 (L) 96 - 112 mmol/L    Co2 19 (L) 20 - 33 mmol/L    Glucose 67 65 - 99 mg/dL    Bun 45 (H) 8 - 22 mg/dL    Creatinine 5.03 (HH) 0.50 - 1.40 mg/dL    Calcium 7.4 (L) 8.5 - 10.5 mg/dL    Anion Gap 18.0 (H) 7.0 - 16.0   CRP QUANTITIVE (NON-CARDIAC)    Collection  Time: 01/22/25  5:55 AM   Result Value Ref Range    Stat C-Reactive Protein 37.00 (H) 0.00 - 0.75 mg/dL   ESTIMATED GFR    Collection Time: 01/22/25  5:55 AM   Result Value Ref Range    GFR (CKD-EPI) 13 (A) >60 mL/min/1.73 m 2   DIFFERENTIAL MANUAL    Collection Time: 01/22/25  5:55 AM   Result Value Ref Range    Bands-Stabs 8.50 0.00 - 10.00 %    Metamyelocytes 5.10 %    Myelocytes 2.50 %    Manual Diff Status PERFORMED     Comment See Comment    PERIPHERAL SMEAR REVIEW    Collection Time: 01/22/25  5:55 AM   Result Value Ref Range    Peripheral Smear Review see below    PLATELET ESTIMATE    Collection Time: 01/22/25  5:55 AM   Result Value Ref Range    Plt Estimation Decreased    MORPHOLOGY    Collection Time: 01/22/25  5:55 AM   Result Value Ref Range    RBC Morphology Present     Poikilocytosis 2+     Echinocytes 2+     Toxic Gran Few     Toxic Vacuoles Few    IMMATURE PLT FRACTION    Collection Time: 01/22/25  5:55 AM   Result Value Ref Range    Imm. Plt Fraction 9.5 0.6 - 13.1 %   POCT glucose device results    Collection Time: 01/22/25  6:05 AM   Result Value Ref Range    POC Glucose, Blood 66 65 - 99 mg/dL   POCT glucose device results    Collection Time: 01/22/25 11:30 AM   Result Value Ref Range    POC Glucose, Blood 80 65 - 99 mg/dL   POCT arterial blood gas device results    Collection Time: 01/22/25 12:58 PM   Result Value Ref Range    Ph 7.495 (H) 7.350 - 7.450    Pco2 27.1 (L) 32.0 - 48.0 mmHg    Po2 51 (L) 83 - 108 mmHg    Tco2 22 (L) 32 - 48 mmol/L    S02 89 (L) 93 - 99 %    Hco3 20.9 (L) 21.0 - 28.0 mmol/L    BE -1 -4 - 3 mmol/L    Body Temp 100.0 F degrees    O2 Therapy 30 %    iPF Ratio 170     Ph Temp Cyndi 7.483 (H) 7.350 - 7.450    Pco2 Temp Co 28.0 (L) 32.0 - 48.0 mmHg    Po2 Temp Cor 54 (L) 83 - 108 mmHg    Specimen Arterial     DelSys Vent     Peep End Expiratory Pressure 8 cmh20    Percent Minute Volume 100     Mode ASV        Fluids    Intake/Output Summary (Last 24 hours) at 1/22/2025  1547  Last data filed at 1/22/2025 1505  Gross per 24 hour   Intake 2473.16 ml   Output 1095 ml   Net 1378.16 ml       Core Measures & Quality Metrics  Core Measures & Quality Metrics    Assessment/Plan  * Trauma- (present on admission)  Assessment & Plan  Ground level fall.  Non Trauma Activation.  Edward Singleton MD. Trauma Surgery    Leukopenia- (present on admission)  Assessment & Plan  1/20 Increased oxygen demand and purulent secretions on bronchoscopy.  Blood cultures negative  BAL grew a pan sensitive E. Coli, de-escalated to cefazolin to complete 7 days of treatment    Pleural effusion on right- (present on admission)  Assessment & Plan  CT shows very large effusion, unknown if only related to increase of previous effusion or has a hemothorax component.  1/15 Thoracentesis completed.  Residual pleural fluid collection.  1/16 IR chest tube placement, pleurevac filled and replaced.  1/19 IR drain replaced chest tube for persistent increasing pneumothorax  1/20 Chest Xray with no pneumothorax noted/ no air leak/300ml in collection chamber/continue suction.  Serial chest Xray    Acute respiratory failure with hypoxia (HCC)- (present on admission)  Assessment & Plan  1/20 emergently intubated in ICU (ketamine/nita).  Continue full mechanical ventilatory support.   Ventilator bundle and Trauma weaning protocol    Malnutrition (HCC)  Assessment & Plan  Nutritional therapies  Follow-up GI evaluation recommendations    Adrenal insufficiency (HCC)- (present on admission)  Assessment & Plan  Cortisol level 13.5.  100 mg hydrocortisone IV, SBP improved.  1/16 50 mg hydrocortisone IV for 3 doses ordered.  Monitor response supplement as needed.    Closed fracture of four ribs, initial encounter- (present on admission)  Assessment & Plan  Right eighth, ninth, tenth, and eleventh rib fractures; flail segment of rib nine.  Aggressive pulmonary hygiene and multimodal pain management.    Hypoglycemia- (present on  admission)  Assessment & Plan  Admission serum glucose 60.   Serial glucose monitoring.   Hypoglycemia protocol.   Monitor    ESRD needing dialysis (HCC)- (present on admission)  Assessment & Plan  HD MWF outpatient  Nephrology consulted from ER  Dialysis per nephrology  Catarina Calvo M.D., Nephrology.    No contraindication to deep vein thrombosis (DVT) prophylaxis- (present on admission)  Assessment & Plan  Prophylactic dose heparin 5000 u q 8 hr initiated upon admission.    Coronary artery disease involving coronary bypass graft of native heart without angina pectoris- (present on admission)  Assessment & Plan  Chronic condition treated with aspirin.  Holding maintenance medication during acute traumatic illness.      I independently reviewed pertinent clinical lab tests from the last 48 hours and ordered additional follow up clinical lab tests.  I independently reviewed pertinent radiographic images and the radiologist's reports from the last 48 hours and ordered additional follow up radiographic studies.  The details of the available patient records in [facility] (including laboratory tests, culture data, medications, imaging, and other pertinent diagnostic tests) and documentation by consulting physicians (when applicable) were reviewed, summated, and that information was utilized as warranted in today's medical decision making for this patient.  I personally evaluated the patient condition at bedside, discussed the daily plan(s) with available nursing staff, dieticians, social workers, pharmacists on multi-disciplinary rounds, and performed frequent reassessments through out the day as clinically warranted.  I evaluated the patient's condition at bedside.    The patient is critically ill with acute respiratory failure requiring Review of interval medical and surgical history, current medications and outpatient medication reconciliation, interval imaging studies and radiologist interpretation, and interval laboratory  values.  Management of rib fractures, right pneumothorax, and right hemothorax.  Evaluation of culture data and targeted antimicrobial therapy. and integration of multiple data points and associated complex medical decision making, active ventilator management, and titration of vasopressors involving high complexity decision making and medically necessary care by providing frequent assessment, manipulation, and support of circulatory function, pulmonary function, and overwhelming infection to prevent further life-threatening deterioration of the patient's condition.     This patient requires continued ICU management and hospital admission.  The patient has impairment of one or more vital organ systems and a high probability of imminent or life-threatening deterioration in condition.     Aggregated critical care time spent evaluating, reassessing, reviewing documentation, providing care, and managing this patient exclusive of procedures: 45 minutes    Julian Chacon MD, FACS

## 2025-01-22 NOTE — PROCEDURES
Pt with ESRD, presented with ground-level fall.  Pt is still VDRF, hypotensive on IV pressors.  Seen and examined while getting HD.  We will try another hemodialysis tomorrow for optimal ultrafiltration if blood pressure tolerate otherwise you might consider CRRT

## 2025-01-23 NOTE — CARE PLAN
Problem: Ventilation  Goal: Ability to achieve and maintain unassisted ventilation or tolerate decreased levels of ventilator support  Description: Target End Date:  4 days     Document on Vent flowsheet    1.  Support and monitor invasive and noninvasive mechanical ventilation  2.  Monitor ventilator weaning response  3.  Perform ventilator associated pneumonia prevention interventions  4.  Manage ventilation therapy by monitoring diagnostic test results  Outcome: Not Met     Ventilator Daily Summary    Vent Day # 4  Airway: 8@25    Ventilator settings: APVcmv/20/410/+8/30%  Weaning trials: none  Respiratory Procedures: none    Plan: Continue current ventilator settings and wean mechanical ventilation as tolerated per physician orders.

## 2025-01-23 NOTE — CARE PLAN
Ventilator Daily Summary    Vent Day #3  Airway: 8@25    Ventilator settings: ASV 90% +10 50%  Weaning trials: yes  Respiratory Procedures: none    Plan: Continue current ventilator settings and wean mechanical ventilation as tolerated per physician orders.  Problem: Ventilation  Goal: Ability to achieve and maintain unassisted ventilation or tolerate decreased levels of ventilator support  Description: Target End Date:  4 days     Document on Vent flowsheet    1.  Support and monitor invasive and noninvasive mechanical ventilation  2.  Monitor ventilator weaning response  3.  Perform ventilator associated pneumonia prevention interventions  4.  Manage ventilation therapy by monitoring diagnostic test results  Outcome: Progressing

## 2025-01-23 NOTE — PROGRESS NOTES
Nephrology Daily Progress Note    Date of Service  1/23/2025    Chief Complaint  60 y.o. male with a history of anuric ESRD on hemodialysis Monday Wednesday Friday, diabetes, hypertension admitted 1/14/2025 with ground-level fall, with rib fractures after falling in the shower, complicated by right hemothorax    Interval Problem Update  1/17 -patient had dialysis yesterday with 1.5 liters removed.  Discussed with patient dialysis again today to get him back on Monday Wednesday Friday schedule.  Patient complains of right sided chest/back pain where the thoracostomy tube is.  Denies shortness of breath.  1/18-patient had dialysis yesterday with 2 L removed.  Denies chest pain, shortness of breath.  1/19 -patient had a larger chest tube today.  Complains of some soreness on his right chest.  Denies shortness of breath.  1/20 patient is hypotensive this morning, encephalopathic  We tried hemodialysis but his left upper arm AV fistula was thrombosed  1/21 events last 24 hours noted  Patient had hypotension, cardiac arrest, had to be intubated  1/23 patient still VDRF  Events last 24-hour noted, last night he had vomiting and, was hypoglycemic  Review of Systems  Review of Systems   Unable to perform ROS: Intubated        Physical Exam  Temp:  [37.2 °C (99 °F)-38.3 °C (101 °F)] 37.6 °C (99.7 °F)  Pulse:  [101-140] 101  Resp:  [14-37] 20  BP: (100-176)/(42-88) 121/69  SpO2:  [72 %-100 %] 96 %    Physical Exam  Vitals and nursing note reviewed.   Constitutional:       Appearance: He is ill-appearing.      Interventions: He is sedated and intubated.   HENT:      Head: Normocephalic and atraumatic.      Right Ear: External ear normal.      Left Ear: External ear normal.      Nose: Nose normal.      Mouth/Throat:      Pharynx: No oropharyngeal exudate or posterior oropharyngeal erythema.      Comments: ETT  Eyes:      General:         Right eye: No discharge.         Left eye: No discharge.      Conjunctiva/sclera:  Conjunctivae normal.   Cardiovascular:      Rate and Rhythm: Normal rate and regular rhythm.   Pulmonary:      Effort: Respiratory distress present. He is intubated.      Breath sounds: No wheezing.      Comments: Coarse BS  Abdominal:      General: Abdomen is flat. Bowel sounds are normal.   Musculoskeletal:      Cervical back: No rigidity. No muscular tenderness.      Right lower leg: Edema present.      Left lower leg: Edema present.   Skin:     General: Skin is warm and dry.      Coloration: Skin is not jaundiced.   Neurological:      Mental Status: He is unresponsive.   Psychiatric:         Behavior: Behavior normal.     Dialysis access: Left upper arm AV fistula with patent bruit and thrill    Fluids    Intake/Output Summary (Last 24 hours) at 1/23/2025 1241  Last data filed at 1/23/2025 0622  Gross per 24 hour   Intake 1345.41 ml   Output 1265 ml   Net 80.41 ml       Laboratory  Labs reviewed, pertinent labs below.  Recent Labs     01/20/25  1820 01/21/25  0405 01/22/25  0555 01/23/25  0505   WBC 0.8* 0.8* 4.9 5.9   RBC 3.63* 3.40* 3.32* 3.05*   HEMOGLOBIN 10.3* 9.0* 8.8* 8.1*   HEMATOCRIT 31.8* 28.9* 27.2* 25.3*   MCV 87.6 85.0 81.9 83.0   MCH 28.4 26.5* 26.5* 26.6*   MCHC 32.4 31.1* 32.4 32.0*   RDW 55.5* 53.1* 52.1* 53.1*   PLATELETCT 81* 51* 25* 17*   MPV 10.1 11.5  --   --      Recent Labs     01/21/25  0405 01/22/25  0555 01/22/25  2335 01/23/25  0505   SODIUM 132* 130*  --  130*   POTASSIUM 3.7 4.2  --  4.0   CHLORIDE 96 93*  --  94*   CO2 20 19*  --  20   GLUCOSE 88 67 52* 72   BUN 32* 45*  --  37*   CREATININE 4.34* 5.03*  --  3.68*   CALCIUM 8.0* 7.4*  --  7.6*     Recent Labs     01/20/25  1820   APTT 51.9*   INR 1.77*             URINALYSIS:  Lab Results   Component Value Date/Time    COLORURINE Yellow 05/10/2009 0740    CLARITY Clear 05/10/2009 0740    SPECGRAVITY 1.015 05/10/2009 0740    PHURINE 6.0 05/10/2009 0740    KETONES 40 (A) 05/10/2009 0740    PROTEINURIN Negative 05/10/2009 0740     "BILIRUBINUR Negative 05/10/2009 0740    NITRITE Negative 05/10/2009 0740    LEUKESTERAS Negative 05/10/2009 0740    OCCULTBLOOD Negative 05/10/2009 0740     UPC  No results found for: \"TOTPROTUR\" No results found for: \"CREATININEU\"      Imaging interpreted by radiologist. Imaging reports reviewed with pertinent findings below  [id number] VIEW   Final Result      Scant bowel gas present. Obstruction cannot be excluded on the basis of this supine image.      DX-CHEST-PORTABLE (1 VIEW)   Final Result         1.  Coronary edema and/or infiltrates, overall stable since prior study.   2.  Small right and trace left pleural effusion, stable.   3.  Cardiomegaly   4.  Atherosclerosis   5.  Right rib fractures      DX-CHEST-PORTABLE (1 VIEW)   Final Result         1.  Coronary edema and/or infiltrates, overall stable since prior study.   2.  Small right and trace left pleural effusion, stable.   3.  Cardiomegaly   4.  Atherosclerosis   5.  Right rib fractures      IR-MIDLINE CATHETER INSERTION WO GUIDANCE > AGE 3   Final Result                  Ultrasound-guided midline placement performed by qualified nursing staff    as above.          DX-ABDOMEN FOR TUBE PLACEMENT   Final Result      2 enteric feeding tube terminates projecting over the expected location of the stomach.      DX-CHEST-PORTABLE (1 VIEW)   Final Result         1.  Mild pulmonary edema and/or infiltrates slightly decreased right lung base compared to prior study.   2.  Small right and trace left pleural effusion, stable.   3.  Cardiomegaly   4.  Atherosclerosis      DX-CHEST-PORTABLE (1 VIEW)   Final Result      1.  Slight increased size of RIGHT pleural effusion.   2.  No other significant change from prior exam.         DX-ABDOMEN FOR TUBE PLACEMENT   Final Result      NG tube extends below the diaphragm with tip at the level of the gastric body.      DX-CHEST-PORTABLE (1 VIEW)   Final Result         1.  Ill-defined opacifications in each lung have " increased compared to the prior radiograph.  This could indicate worsening of pulmonary edema or inflammation.      2.  There is some increase in opacification in the right mid and lower lung.      3.  Endotracheal tube tip is between clavicles and gonzalo.      4.  Right-sided chest tube is again noted.      DX-CHEST-PORTABLE (1 VIEW)   Final Result      1.  Small right pleural effusion.   2.  Increased right lower lung pneumonia.   3.  No appreciable right pneumothorax. Right thoracostomy tube in place.      DX-CHEST-PORTABLE (1 VIEW)   Final Result         1.  Mild pulmonary edema and/or infiltrates increasing right lung base compared to prior study.   2.  Small right and trace left pleural effusion, stable.   3.  Right pneumothorax, appears slightly increased since prior study   4.  Cardiomegaly   5.  Atherosclerosis      DX-CHEST-PORTABLE (1 VIEW)   Final Result         1.  Mild pulmonary edema and/or infiltrates.   2.  Small right and trace left pleural effusion.   3.  Right pneumothorax, appears slightly increased since prior study   4.  Cardiomegaly   5.  Atherosclerosis      DX-CHEST-PORTABLE (1 VIEW)   Final Result      Resolution of the right pneumothorax status post new large-bore right-sided chest tube placement.      DX-CHEST-PORTABLE (1 VIEW)   Final Result         1. Slight increase in right pneumothorax since yesterday. Kink in right pleural pigtail drain noted.      2. Right rib fractures. Sternal wires.      DX-CHEST-PORTABLE (1 VIEW)   Final Result      Interval increase in size of the right-sided pneumothorax.      DX-CHEST-PORTABLE (1 VIEW)   Final Result         1. Slight increase in 3 cm right pneumothorax. Right pleural pigtail drain remains.      2. Right rib fractures.      3. Mild infiltrates. Trace left effusion. Sternal wires.                     DX-CHEST-PORTABLE (1 VIEW)   Final Result         1.  No acute cardiopulmonary disease.   2.  Right pneumothorax, appears slightly increased  since prior study   3.  Cardiomegaly   4.  Atherosclerosis      DX-CHEST-PORTABLE (1 VIEW)   Final Result         1.  No acute cardiopulmonary disease.   2.  Right lung base pneumothorax, appears slightly increased since prior study   3.  Atherosclerosis      DX-CHEST-PORTABLE (1 VIEW)   Final Result      1.  Interval placement of a right-sided small bore thoracostomy tube.   2.  No significant residual right sided effusion.   3.  Small right-sided pneumothorax.      IR-CHEST TUBE-EMPYEMA RIGHT   Final Result      Successful US GUIDED right  CHEST TUBE PLACEMENT.      DX-CHEST-PORTABLE (1 VIEW)   Final Result         1.  Scattered hazy bilateral pulmonary edema and/or infiltrates, greater on the right, similar to prior study   2.  Moderate to large layering right pleural effusion, stable   3.  Atherosclerosis      DX-CHEST-LIMITED (1 VIEW)   Final Result         Moderate right pleural effusion with patchy right lung opacity, worse than prior.      US-THORACENTESIS PUNCTURE RIGHT   Final Result      1. Ultrasound guided right sided therapeutic thoracentesis.      2. 2000 mL of fluid withdrawn.      DX-CHEST-PORTABLE (1 VIEW)   Final Result      No evidence of right-sided pneumothorax status post thoracentesis. Moderate residual right-sided pleural fluid.      DX-CHEST-PORTABLE (1 VIEW)   Final Result         1.  Scattered hazy bilateral pulmonary edema and/or infiltrates are   2.  Moderate to large layering right pleural effusion   3.  Atherosclerosis      CT-CHEST (THORAX) W/O   Final Result         1.  Right posterior eighth through 11th rib fractures, with flail segment of the ninth rib.   2.  Large layering right pleural effusion   3.  Linear consolidations in the right lung base favoring atelectasis, component of infiltrate not excluded.   4.  Atherosclerosis and atherosclerotic coronary artery disease   5.  Irregular hepatic contour favoring changes of cirrhosis      DX-PELVIS-1 OR 2 VIEWS   Final Result          1.  No acute traumatic bony injury.   2.  Atherosclerosis      [id number] (WITH 1-VIEW CXR) RIGHT   Final Result         1.  Right lateral eighth rib fracture and posterior right 10th and 11th rib fractures   2.  Segmental right ninth rib fracture, compatible with component of flail chest.   3.  Moderate to large layering right pleural effusion   4.  Scattered patchy bilateral pulmonary infiltrates   5.  Atherosclerosis            Current Facility-Administered Medications   Medication Dose Route Frequency Provider Last Rate Last Admin    scopolamine (Transderm-Scop) patch 1 Patch  1 Patch Transdermal Q72HRS Edward Chacon M.D.   1 Patch at 01/23/25 0957    norepinephrine (Levophed) 8 mg in 250 mL NS infusion (premix)  0-1 mcg/kg/min Intravenous Continuous Edward Chacon M.D. 19.4 mL/hr at 01/23/25 0912 0.21 mcg/kg/min at 01/23/25 0912    ceFAZolin in dextrose (Ancef) IVPB premix 1 g  1 g Intravenous Q24HRS Edward Chacon M.D.   Stopped at 01/22/25 1533    dextrose 50% (D50W) injection 25 g  25 g Intravenous Q15 MIN PRN Neftali Germain M.D.   25 g at 01/23/25 0515    haloperidol lactate (Haldol) injection 1 mg  1 mg Intravenous Q4HRS PRN Edward Chacon M.D.   1 mg at 01/23/25 0545    Respiratory Therapy Consult   Nebulization Continuous RT Edward Chacon M.D.        Pharmacy Consult: Enteral tube insertion - review meds/change route/product selection  1 Each Other PHARMACY TO DOSE Edward Chacon M.D.        dextrose 10% infusion   Intravenous Continuous Meghan Vazquez, A.P.N. 75 mL/hr at 01/23/25 0625 New Bag at 01/23/25 0625    heparin intracatheter (for DIALYSIS USE ONLY) 1,400 Units  1,400 Units Intracatheter DIALYSIS PRN Fadi Najjar, M.D.   1,400 Units at 01/22/25 1354    heparin intracatheter (for DIALYSIS USE ONLY) 1,400 Units  1,400 Units Intracatheter DIALYSIS PRN Fadi Najjar, M.D.   1,400 Units at 01/22/25 1354    gabapentin (Neurontin) capsule 100 mg  100 mg Enteral Tube BID  Hua Bran M.D.   100 mg at 01/23/25 0500    lansoprazole (Prevacid) solutab 30 mg  30 mg Enteral Tube DAILY Hua Bran M.D.   30 mg at 01/23/25 0500    ondansetron (Zofran ODT) dispertab 4 mg  4 mg Enteral Tube Q4HRS PRN Hua Bran M.D.        oxyCODONE immediate-release (Roxicodone) tablet 5 mg  5 mg Enteral Tube Q3HRS PRN Hua Bran M.D.   5 mg at 01/22/25 1412    Or    oxyCODONE immediate release (Roxicodone) tablet 10 mg  10 mg Enteral Tube Q3HRS PRN Hua Bran M.D.        dexmedetomidine (Precedex) 400 mcg/100mL infusion  0.1-1.5 mcg/kg/hr Intravenous Continuous Hua Bran M.D. 12.7 mL/hr at 01/23/25 0957 1 mcg/kg/hr at 01/23/25 0957    epoetin (Retacrit) injection 3,000 Units  3,000 Units Intravenous MO, WE + FR Wander Gerardo M.D.   3,000 Units at 01/22/25 1334    NS (Bolus) 0.9 % infusion 100 mL  100 mL Intravenous DIALYSIS PRN Catarina Calvo M.D.        HYDROmorphone (Dilaudid) injection 0.5 mg  0.5 mg Intravenous Q3HRS PRN Lynne Milligan P.A.-C.   0.5 mg at 01/20/25 0608    Pharmacy Consult Request ...Pain Management Review 1 Each  1 Each Other PHARMACY TO DOSE Edward Chacon M.D.        ondansetron (Zofran) syringe/vial injection 4 mg  4 mg Intravenous Q4HRS PRN Edward Chacon M.D.   4 mg at 01/23/25 0259    bisacodyl (Dulcolax) suppository 10 mg  10 mg Rectal Q24HRS PRN Edward Chacon M.D.        [Held by provider] heparin injection 5,000 Units  5,000 Units Subcutaneous Q8HRS Edward Chacon M.D.        lidocaine (Asperflex) 4 % patch 1 Patch  1 Patch Transdermal Q24HR Edward Chacon M.D.   1 Patch at 01/22/25 2039    [Held by provider] methocarbamol (Robaxin) tablet 750 mg  750 mg Oral 4X/DAY Edward Chacon M.D.   750 mg at 01/15/25 1250    hydrALAZINE (Apresoline) injection 10-20 mg  10-20 mg Intravenous Q4HRS PRN Edward Chacon M.D.   20 mg at 01/17/25 0909    labetalol (Normodyne/Trandate) injection 10-20 mg  10-20 mg Intravenous Q4HRS PRN  Edward Chacon M.D.   10 mg at 01/19/25 1544         Assessment/Plan  60 y.o. male with a history of anuric ESRD on hemodialysis Monday Wednesday Friday, diabetes, hypertension admitted 1/14/2025 with ground-level fall, with rib fractures after falling in the shower, complicated by right hemothorax    1.  ESRD on hemodialysis Monday Wednesday Friday.    2.  Dialysis access: Left upper arm AV fistula, thrombosed  3.  Ground-level fall and right hemothorax,  4.  Anemia of chronic disease.  5.  Hypotension.  6.  VDRF  7.  Thrombocytopenia  no acute need for HD, continue to evaluate daily  JEANINE with dialysis  Renal diet  Daily BMP, CBC.  Renal dose all meds  Avoid nephrotoxins like NSAIDs.  Multisystem organ failure  Prognosis poor.  I did discuss with the family the poor prognosis, and recommended to transition into focusing on quality based treatment, family is to discusse the matter however they are awaiting more family members to come later today      Plan discussed with Dr. Chacon

## 2025-01-23 NOTE — PROGRESS NOTES
Restart tube feeds at goal of 30ml/hr and hold immodium for the night per Staci JOSEPH    2000: Tube feeds restarted at 30ml/hr    2245: Patient had a bout of about 200ml of bilious emesis out, vital signs are stable, Marcellus JOSEPH made aware, tube feeds being held tonight and will readdress in morning

## 2025-01-23 NOTE — DIETARY
Nutrition Services Brief Update:    Tube feed with Novasource Renal advanced to goal rate of 30 ml/hour 1/22 but held last night due to emesis. Resume tube feeding when feasible.    Problem: Nutritional:  Goal: Nutrition support tolerated and meeting greater than 85% of estimated needs  Outcome: Not met    RD following

## 2025-01-23 NOTE — PROGRESS NOTES
Hypoglycemia Intervention    Hypoglycemia protocol intervention:  Blood glucose 62 at 0506.  Intervention: 25 g IV dextrose per MAR   Repeat blood glucose 91 at 0530.  Intervention: Patient NPO, recheck blood glucose in 1 hour   Additional interventions needed: Recheck 1 hour after dose, recheck FSBS was 111  Charge notified of the above at 0510.

## 2025-01-23 NOTE — PROGRESS NOTES
Differentials from Lab called with critical result of platelet count at 17. Critical lab result read back to lab.   Dr. Germain notified of critical lab result at 0610.

## 2025-01-23 NOTE — PROGRESS NOTES
Hypoglycemia Intervention    Hypoglycemia protocol intervention:  Blood glucose 14 at 2328.  Intervention: 25 g IV dextrose per MAR   Repeat blood glucose 89 and 106 at 0005.  Intervention: Patient NPO, recheck blood glucose in 1 hour   Additional interventions needed: Blood glucose recheck post one hour at 0105 resulted at 81  Dr. Germain notified of the above at 2340.

## 2025-01-23 NOTE — CARE PLAN
Problem: Pain - Standard  Goal: Alleviation of pain or a reduction in pain to the patient’s comfort goal  Description: Target End Date:  Prior to discharge or change in level of care    Document on Vitals flowsheet    1.  Document pain using the appropriate pain scale per order or unit policy  2.  Educate and implement non-pharmacologic comfort measures (i.e. relaxation, distraction, massage, cold/heat therapy, etc.)  3.  Pain management medications as ordered  4.  Reassess pain after pain med administration per policy  5.  If opiods administered assess patient's response to pain medication is appropriate per POSS sedation scale  6.  Follow pain management plan developed in collaboration with patient and interdisciplinary team (including palliative care or pain specialists if applicable)  Outcome: Progressing     Problem: Safety - Medical Restraint  Goal: Remains free of injury from restraints (Restraint for Interference with Medical Device)  Description: INTERVENTIONS:  1. Determine that other, less restrictive measures have been tried or would not be effective before applying the restraint  2. Evaluate the patient's condition at the time of restraint application  3. Educate patient/family regarding the reason for restraint  4. Q2H: Monitor safety, psychosocial status, comfort, circulation, respiratory status, LOC, nutrition and hydration  Outcome: Progressing  Goal: Free from restraint(s) (Restraint for Interference with Medical Device)  Description: INTERVENTIONS:  1.  ONCE/SHIFT or MINIMUM Q12H: Assess and document the continuing need for restraints  2.  Q24H: Continued use of restraint requires LIP to perform face to face examination and written order  3.  Identify and implement measures to help patient regain control  4.  Educate patient/family on discontinuation criteria   5.  Assess patient's understanding and retention of education provided  6.  Assess readiness for release & initiate progressive release  per protocol  7.  Identify and document criteria for restraints  Outcome: Progressing   The patient is Watcher - Medium risk of patient condition declining or worsening    Shift Goals  Clinical Goals: Hemodynamic stability, pulm hygiene,airway safety  Patient Goals: ASH  Family Goals: Updates    Progress made toward(s) clinical / shift goals:  q2 turns, hemodynamic stability, restart TF, sedation and bp management    Patient is not progressing towards the following goals:

## 2025-01-24 NOTE — PROGRESS NOTES
Family has decided to transition to comfort care which was discussed with Dr Chacon throughout the day. Orders placed.

## 2025-01-24 NOTE — PROGRESS NOTES
1919: Family has decided to go comfort care. MD updated. Medications ordered and administered (see MAR).     1931: Pt extubated to comfort care by RT. Iris removed with extubation.     1955: 2 RN's assessed and declared pt had passed. MD updated. Family at the bedside.

## 2025-01-24 NOTE — DISCHARGE SUMMARY
DATE OF ADMISSION:  01/14/2025   DATE OF DISCHARGE:  01/23/2025     DEATH SUMMARY     TIME OF DEATH:  1955.     DIAGNOSES AT THE TIME OF DEATH:  1.  Acute respiratory failure with hypoxia.  2.  Pleural effusion on right.  3.  Leukopenia.  4.  End-stage renal disease, needing dialysis.  5.  Hypoglycemia.  6.  Closed fracture of four ribs.  7.  Adrenal insufficiency.  8.  Malnutrition.  9.  Trauma.     HOSPITAL COURSE:  The patient is a 60-year-old male with a past medical   history significant for severe protein calorie malnutrition, cachexia and   end-stage renal disease with hemodialysis, who sustained a ground level fall   on to the side of the bathtub resulting in right sided blunt chest trauma.    The patient was brought to the ER on the 15th for evaluation and was found to   have four rib fractures and a sizable effusion.  The patient was admitted on   to the trauma services. Following day, the patient underwent a right   thoracentesis with evacuation of 2 liters of bloody fluid.  There was residual   of effusion present, so the following day, the patient underwent placement of   the pigtail chest tube by interventional radiology and additional 2 liters   drained out. Due to the large volume of effusion that was returned that was   bloody, the patient was transferred to the trauma surgical intensive care unit   for monitoring.  The patient had no evidence of ongoing or active hemorrhage   thankfully in the coming days.  However, he had persistent right basilar   pneumothorax that was not improved with the pigtail. Chest tubes were more   formal chest tube had to be placed.  The patient continued to be monitored in   the intensive care unit, was receiving hemodialysis as per directed by the   nephrologist. On 01/20, the patient had a physiologic and respiratory   decompensation that resulted in intubation and needing a bronchoscopy and an   infectious workup. In the coming days, the patient had significant  issues,   tolerating hemodialysis, becoming a dramatically unstable with his   hemodynamics and the hemodialysis circuit had to be held at times and   completely abandoned at others.  Additionally, the patient was having issues   with feeding intolerance.  Though, the patient typically had chronic diarrhea   at baseline. At any time tube feeds were attempted to be started.  The patient   would have emesis.  Furthermore, likely due to the patient's baseline   malnutrition and complicated by his critical illness, he was having severe   ongoing episodes of hypoglycemia that required continuous infusion of   dextrose. Given the multitude of physiologic issues, the patient was having   along with multiorgan failure, discussion was had with the patient's family in   the morning of . This provider expressed concerns about the futility of   care given everything that had been going on with him both at baseline as well   as more acutely here in the hospital. Several family members arrived and   multiple discussions were held throughout the day and was felt that it was in   the patient's best interest to proceed to comfort care.  This was carried out   on the evening of the  and the patient  very quickly.        ______________________________  MD DALIA Reynoso/XAVIER    DD:  2025 08:59  DT:  2025 09:44    Job#:  [id number]

## 2025-01-24 NOTE — PROGRESS NOTES
Trauma / Surgical Daily Progress Note    Date of Service  1/23/2025    Chief Complaint  60 y.o. male admitted 1/14/2025 with injury    Interval Events  Ongoing issues with intolerance of hemodialysis from a hemodynamic perspective  Discussed with Nephrology   Overall, given the persistent multi-organ failure, GI issues, and malnutrition, I believe we are approaching a point of futile care.  The Nephrologist agrees  Family updated during rounds and later in the day    Vital Signs for last 24 hours  Temp:  [37.2 °C (99 °F)-37.7 °C (99.8 °F)] 37.6 °C (99.7 °F)  Pulse:  [101-106] 101  Resp:  [20-37] 20  BP: (116-176)/(66-88) 121/69  SpO2:  [72 %-100 %] 96 %    Hemodynamic parameters for last 24 hours       Respiratory Data     Respiration: 20, Pulse Oximetry: 96 %     Work Of Breathing / Effort: Vented  RUL Breath Sounds: Clear, RML Breath Sounds: Clear, RLL Breath Sounds: Diminished, DEVIN Breath Sounds: Clear, LLL Breath Sounds: Diminished    Physical Exam  Physical Exam  Vitals and nursing note reviewed.   Constitutional:       Interventions: He is sedated, intubated and restrained.   HENT:      Head: Normocephalic.   Eyes:      General: Lids are normal.      Conjunctiva/sclera: Conjunctivae normal.      Pupils: Pupils are equal, round, and reactive to light.   Neck:      Trachea: Trachea normal.   Cardiovascular:      Rate and Rhythm: Normal rate and regular rhythm.      Pulses: No decreased pulses.   Pulmonary:      Effort: Pulmonary effort is normal. He is intubated.      Breath sounds: Normal breath sounds.   Chest:      Comments: Right chest tube in place with serosang output  Abdominal:      General: Abdomen is flat. There is no distension.      Palpations: Abdomen is soft.      Tenderness: There is no abdominal tenderness.   Genitourinary:     Penis: Normal.    Musculoskeletal:      Cervical back: Neck supple.   Skin:     General: Skin is warm and dry.      Capillary Refill: Capillary refill takes less than 2  seconds.   Neurological:      General: No focal deficit present.      Mental Status: He is lethargic.      GCS: GCS eye subscore is 2. GCS verbal subscore is 1. GCS motor subscore is 5.      Sensory: Sensation is intact.      Motor: Motor function is intact.         Laboratory  Recent Results (from the past 24 hours)   POCT glucose device results    Collection Time: 01/22/25 11:28 PM   Result Value Ref Range    POC Glucose, Blood 14 (LL) 65 - 99 mg/dL   Blood Glucose    Collection Time: 01/22/25 11:35 PM   Result Value Ref Range    Glucose 52 (LL) 65 - 99 mg/dL   POCT glucose device results    Collection Time: 01/23/25 12:09 AM   Result Value Ref Range    POC Glucose, Blood 106 (H) 65 - 99 mg/dL   POCT glucose device results    Collection Time: 01/23/25  1:06 AM   Result Value Ref Range    POC Glucose, Blood 81 65 - 99 mg/dL   CBC WITH DIFFERENTIAL    Collection Time: 01/23/25  5:05 AM   Result Value Ref Range    WBC 5.9 4.8 - 10.8 K/uL    RBC 3.05 (L) 4.70 - 6.10 M/uL    Hemoglobin 8.1 (L) 14.0 - 18.0 g/dL    Hematocrit 25.3 (L) 42.0 - 52.0 %    MCV 83.0 81.4 - 97.8 fL    MCH 26.6 (L) 27.0 - 33.0 pg    MCHC 32.0 (L) 32.3 - 36.5 g/dL    RDW 53.1 (H) 35.9 - 50.0 fL    Platelet Count 17 (LL) 164 - 446 K/uL    Neutrophils-Polys 73.00 (H) 44.00 - 72.00 %    Lymphocytes 4.40 (L) 22.00 - 41.00 %    Monocytes 3.70 0.00 - 13.40 %    Eosinophils 0.70 0.00 - 6.90 %    Basophils 0.00 0.00 - 1.80 %    Nucleated RBC 0.00 0.00 - 0.20 /100 WBC    Neutrophils (Absolute) 4.87 1.82 - 7.42 K/uL    Lymphs (Absolute) 0.26 (L) 1.00 - 4.80 K/uL    Monos (Absolute) 0.22 0.00 - 0.85 K/uL    Eos (Absolute) 0.04 0.00 - 0.51 K/uL    Baso (Absolute) 0.00 0.00 - 0.12 K/uL    NRBC (Absolute) 0.00 K/uL    Hypochromia 1+     Anisocytosis 1+     Macrocytosis 2+ (A)     Microcytosis 1+    Basic Metabolic Panel    Collection Time: 01/23/25  5:05 AM   Result Value Ref Range    Sodium 130 (L) 135 - 145 mmol/L    Potassium 4.0 3.6 - 5.5 mmol/L     Chloride 94 (L) 96 - 112 mmol/L    Co2 20 20 - 33 mmol/L    Glucose 72 65 - 99 mg/dL    Bun 37 (H) 8 - 22 mg/dL    Creatinine 3.68 (H) 0.50 - 1.40 mg/dL    Calcium 7.6 (L) 8.5 - 10.5 mg/dL    Anion Gap 16.0 7.0 - 16.0   ESTIMATED GFR    Collection Time: 01/23/25  5:05 AM   Result Value Ref Range    GFR (CKD-EPI) 18 (A) >60 mL/min/1.73 m 2   DIFFERENTIAL MANUAL    Collection Time: 01/23/25  5:05 AM   Result Value Ref Range    Bands-Stabs 9.50 0.00 - 10.00 %    Metamyelocytes 8.00 %    Myelocytes 0.70 %    Manual Diff Status PERFORMED    PERIPHERAL SMEAR REVIEW    Collection Time: 01/23/25  5:05 AM   Result Value Ref Range    Peripheral Smear Review see below    PLATELET ESTIMATE    Collection Time: 01/23/25  5:05 AM   Result Value Ref Range    Plt Estimation SEE BELOW    MORPHOLOGY    Collection Time: 01/23/25  5:05 AM   Result Value Ref Range    RBC Morphology Present     Poikilocytosis 2+     Target Cells 1+     Echinocytes 2+     Toxic Gran Moderate     Toxic Vacuoles Few     Dohle Bodies Moderate    IMMATURE PLT FRACTION    Collection Time: 01/23/25  5:05 AM   Result Value Ref Range    Imm. Plt Fraction 15.7 (H) 0.6 - 13.1 %   POCT glucose device results    Collection Time: 01/23/25  5:06 AM   Result Value Ref Range    POC Glucose, Blood 62 (L) 65 - 99 mg/dL   POCT glucose device results    Collection Time: 01/23/25  5:36 AM   Result Value Ref Range    POC Glucose, Blood 91 65 - 99 mg/dL   POCT glucose device results    Collection Time: 01/23/25  6:27 AM   Result Value Ref Range    POC Glucose, Blood 111 (H) 65 - 99 mg/dL   POCT glucose device results    Collection Time: 01/23/25  2:38 PM   Result Value Ref Range    POC Glucose, Blood 56 (L) 65 - 99 mg/dL       Fluids    Intake/Output Summary (Last 24 hours) at 1/23/2025 1733  Last data filed at 1/23/2025 0622  Gross per 24 hour   Intake 374.26 ml   Output 220 ml   Net 154.26 ml       Core Measures & Quality Metrics  Labs reviewed, Medications reviewed and  Radiology images reviewed  Lopez catheter: No Lopez  Central line in place: Dialysis and Shock    DVT Prophylaxis: Contraindicated - High bleeding risk  DVT prophylaxis - mechanical: SCDs  Ulcer prophylaxis: Yes          Assessment/Plan  * Trauma- (present on admission)  Assessment & Plan  Ground level fall.  Non Trauma Activation.  Edward Singleton MD. Trauma Surgery    Leukopenia- (present on admission)  Assessment & Plan  1/20 Increased oxygen demand and purulent secretions on bronchoscopy.  Blood cultures negative  BAL grew a pan sensitive E. Coli, de-escalated to cefazolin to complete 7 days of treatment    Pleural effusion on right- (present on admission)  Assessment & Plan  CT shows very large effusion, unknown if only related to increase of previous effusion or has a hemothorax component.  1/15 Thoracentesis completed.  Residual pleural fluid collection.  1/16 IR chest tube placement, pleurevac filled and replaced.  1/19 IR drain replaced chest tube for persistent increasing pneumothorax  1/20 Chest Xray with no pneumothorax noted/ no air leak/300ml in collection chamber/continue suction.  Serial chest Xray    Acute respiratory failure with hypoxia (HCC)- (present on admission)  Assessment & Plan  1/20 emergently intubated in ICU (ketamine/nita).  Continue full mechanical ventilatory support.   Ventilator bundle and Trauma weaning protocol    Malnutrition (HCC)  Assessment & Plan  Nutritional therapies  Follow-up GI evaluation recommendations    Adrenal insufficiency (HCC)- (present on admission)  Assessment & Plan  Cortisol level 13.5.  100 mg hydrocortisone IV, SBP improved.  1/16 50 mg hydrocortisone IV for 3 doses ordered.  Monitor response supplement as needed.    Closed fracture of four ribs, initial encounter- (present on admission)  Assessment & Plan  Right eighth, ninth, tenth, and eleventh rib fractures; flail segment of rib nine.  Aggressive pulmonary hygiene and multimodal pain  management.    Hypoglycemia- (present on admission)  Assessment & Plan  Admission serum glucose 60.   Serial glucose monitoring.   Hypoglycemia protocol.   Monitor    ESRD needing dialysis (HCC)- (present on admission)  Assessment & Plan  HD MWF outpatient  Nephrology consulted from ER  Dialysis per nephrology  Catarina Calvo M.D., Nephrology.    No contraindication to deep vein thrombosis (DVT) prophylaxis- (present on admission)  Assessment & Plan  Prophylactic dose heparin 5000 u q 8 hr initiated upon admission.    Coronary artery disease involving coronary bypass graft of native heart without angina pectoris- (present on admission)  Assessment & Plan  Chronic condition treated with aspirin.  Holding maintenance medication during acute traumatic illness.      I independently reviewed pertinent clinical lab tests from the last 48 hours and ordered additional follow up clinical lab tests.  I independently reviewed pertinent radiographic images and the radiologist's reports from the last 48 hours and ordered additional follow up radiographic studies.  The details of the available patient records in [facility] (including laboratory tests, culture data, medications, imaging, and other pertinent diagnostic tests) and documentation by consulting physicians (when applicable) were reviewed, summated, and that information was utilized as warranted in today's medical decision making for this patient.  I personally evaluated the patient condition at bedside, discussed the daily plan(s) with available nursing staff, dieticians, social workers, pharmacists on multi-disciplinary rounds, and performed frequent reassessments through out the day as clinically warranted.  I evaluated the patient's condition at bedside.    The patient is critically ill with acute respiratory failure requiring Review of interval medical and surgical history, current medications and outpatient medication reconciliation, interval imaging studies and  radiologist interpretation, and interval laboratory values.  Management of rib fractures, right pneumothorax, and right hemothorax.  Evaluation of culture data and targeted antimicrobial therapy. and integration of multiple data points and associated complex medical decision making, active ventilator management, and titration of vasopressors involving high complexity decision making and medically necessary care by providing frequent assessment, manipulation, and support of circulatory function, pulmonary function, and overwhelming infection to prevent further life-threatening deterioration of the patient's condition.     This patient requires continued ICU management and hospital admission.  The patient has impairment of one or more vital organ systems and a high probability of imminent or life-threatening deterioration in condition.     Aggregated critical care time spent evaluating, reassessing, reviewing documentation, providing care, and managing this patient exclusive of procedures: 45 minutes    Julian Chacon MD, FACS

## 2025-01-24 NOTE — CARE PLAN
Ventilator Daily Summary    Vent Day #4  Airway: 8@25    Ventilator settings: 90 +8 40%  Weaning trials: yes  Respiratory Procedures: none    Plan: Continue current ventilator settings and wean mechanical ventilation as tolerated per physician orders.  Problem: Ventilation  Goal: Ability to achieve and maintain unassisted ventilation or tolerate decreased levels of ventilator support  Description: Target End Date:  4 days     Document on Vent flowsheet    1.  Support and monitor invasive and noninvasive mechanical ventilation  2.  Monitor ventilator weaning response  3.  Perform ventilator associated pneumonia prevention interventions  4.  Manage ventilation therapy by monitoring diagnostic test results  Outcome: Progressing

## 2025-01-24 NOTE — DISCHARGE SUMMARY
Death Summary    Cause of Death  Cardiac arrest due to multi-organ failure due to end stage renal disease    Comorbid Conditions at the Time of Death  Principal Problem:    Trauma (POA: Yes)  Active Problems:    Acute respiratory failure with hypoxia (HCC) (POA: Yes)    Pleural effusion on right (POA: Yes)    Leukopenia (POA: Yes)    ESRD needing dialysis (HCC) (POA: Yes)    Hypoglycemia (POA: Yes)    Closed fracture of four ribs, initial encounter (POA: Yes)    Adrenal insufficiency (HCC) (POA: Yes)    Malnutrition (HCC) (POA: Unknown)      Overview: BMI 17      Reports chronic diarrhea    Coronary artery disease involving coronary bypass graft of native heart without angina pectoris (Chronic) (POA: Yes)    No contraindication to deep vein thrombosis (DVT) prophylaxis (POA: Yes)  Resolved Problems:    Essential (primary) hypertension (Chronic) (POA: Yes)    Leukopenia  Assessment & Plan  1/20 Increased oxygen demand and purulent secretions on bronchoscopy.  Blood cultures negative  BAL grew a pan sensitive E. Coli, de-escalated to cefazolin to complete 7 days of treatment    Pleural effusion on right  Assessment & Plan  CT shows very large effusion, unknown if only related to increase of previous effusion or has a hemothorax component.  1/15 Thoracentesis completed.  Residual pleural fluid collection.  1/16 IR chest tube placement, pleurevac filled and replaced.  1/19 IR drain replaced chest tube for persistent increasing pneumothorax  1/20 Chest Xray with no pneumothorax noted/ no air leak/300ml in collection chamber/continue suction.  Serial chest Xray    Acute respiratory failure with hypoxia (HCC)  Assessment & Plan  1/20 emergently intubated in ICU (ketamine/nita).  Continue full mechanical ventilatory support.   Ventilator bundle and Trauma weaning protocol    Malnutrition (HCC)  Assessment & Plan  Nutritional therapies  Follow-up GI evaluation recommendations    Adrenal insufficiency (HCC)  Assessment &  Plan  Cortisol level 13.5.  100 mg hydrocortisone IV, SBP improved.  1/16 50 mg hydrocortisone IV for 3 doses ordered.  Monitor response supplement as needed.    Closed fracture of four ribs, initial encounter  Assessment & Plan  Right eighth, ninth, tenth, and eleventh rib fractures; flail segment of rib nine.  Aggressive pulmonary hygiene and multimodal pain management.    Hypoglycemia  Assessment & Plan  Admission serum glucose 60.   Serial glucose monitoring.   Hypoglycemia protocol.   Monitor    ESRD needing dialysis (HCC)  Assessment & Plan  HD MWF outpatient  Nephrology consulted from ER  Dialysis per nephrology  Catarina Calvo M.D., Nephrology.    No contraindication to deep vein thrombosis (DVT) prophylaxis  Assessment & Plan  Prophylactic dose heparin 5000 u q 8 hr initiated upon admission.    Coronary artery disease involving coronary bypass graft of native heart without angina pectoris  Assessment & Plan  Chronic condition treated with aspirin.  Holding maintenance medication during acute traumatic illness.    * Trauma  Assessment & Plan  Ground level fall.  Non Trauma Activation.  Edward Singleton MD. Trauma Surgery         History of Presenting Illness and Hospital Course  Please see separate dictation.    Death Date: 01/23/25   Death Time: 1955         Pronounced By (RN1): Leonardo Olmos  Pronounced By (RN2): Caro Garcia

## 2025-01-24 NOTE — DISCHARGE SUMMARY
DATE OF SERVICE:  01/23/2025     TIME OF DEATH:  1955.     DIAGNOSES:  At the time of death:  1.  Acute respiratory failure with hypoxia.  2.  Pleural effusion on right.  3.  Leukopenia.  4.  End-stage renal disease, needing dialysis.  5.  Hypoglycemia.  6.  Closed fracture of four ribs.  7.  Adrenal insufficiency.  8.  Malnutrition.  9.  Trauma.     HISTORY OF PRESENT ILLNESS:  The patient is a 60-year-old male with a past   medical history significant for malnutrition with cachexia and end-stage renal   disease requiring dialysis.  The patient had sustained a fall against the   bathtub on 01/15/2025 resulting in a right chest wall trauma.  He was brought   to the ER and diagnosed with four rib fractures and a sizable pleural   effusion.  The patient was admitted under the trauma service and underwent   thoracentesis, which evacuated 2 L of bloody fluid.  This was followed up the   following day having interventional radiology placed a drain into the   effusion, which was then drained 2 more L.  Due to the amount of fluid and   blood that was drained from the pleural space, the patient was transferred to   the trauma surgical intensive care unit for ongoing monitoring.  The patient   had a good evacuation of the effusion on this side; however, had a persistent   basilar pneumothorax required placement of a more formal chest tube in the   coming days.  The patient was doing relatively well.     DICTATION ENDS HERE.        ______________________________  Edward Chacon MD JRU/MARI    DD:  01/24/2025 08:53  DT:  01/24/2025 09:05    Job#:  [id number]

## 2025-01-25 LAB
BACTERIA BLD CULT: NORMAL
BACTERIA BLD CULT: NORMAL
SIGNIFICANT IND 70042: NORMAL
SIGNIFICANT IND 70042: NORMAL
SITE SITE: NORMAL
SITE SITE: NORMAL
SOURCE SOURCE: NORMAL
SOURCE SOURCE: NORMAL

## 2025-02-13 NOTE — DOCUMENTATION QUERY
[facility]                                                                       Query Response Note      PATIENT:               MEGHANA PRUETT  ACCT #:                  [id number]  MRN:                     [id number]  :                      1965  ADMIT DATE:       2025 7:55 PM  DISCH DATE:        2025 7:55 PM  RESPONDING  PROVIDER #:        [id number]           QUERY TEXT:    Malnutrition is documented in the Medical Record.  Please specify the severity:    *Note:  If you agree with a diagnosis provided, please remember to include it in your daily concurrent documentation and onto the Discharge Summary    The patient's Clinical Indicators include:  Findings:  --Patient admitted s/p GLF for right pleural effusion, acute hypoxic respiratory failure and closed right rib fractures x 4  --Per multiple PNs and DC summary, ''Malnutrition'' is documented  --Per DC Summary , ''history significant for severe protein calorie malnutrition'' is documented  --Per MD query response on , patient has cachexia  --BMI:  16.49, weight:  49.2kg, height:  1.727m per Epic chart review  --Per RD consult , ''Consult received for BNI <19'' documented  --Per RD consult , ''Pt states he has lost a little weight and only eats about 1 meal per day. He does get a protein     supplement from dialysis, but he can't tolerate Nepro supplements'' documented  --Per RD consult , ''NFPE:  Weight Loss:  Per chart review, weight has varied up and down between 50 and 53kg over the     past year. Unable to determine weight loss. Muscle Mass:  Severe wasting at temple, quadriceps. Subcutaneous Fat:  Severe     loss at orbital. Fluid Accumulation:  None noted'' documented  --Per RD consult , ''Severe malnutrition in the context of chronic illness related to ESRD as evidenced by severe fat and     muscle loss, reported poor intake estimated  <75% of needs for >1 month'' documented    Treatment:  --RD consult  --Renal consistent CHO diet  --Monitor nutrition and weight  --Additional fluids per MD/DO    Risk Factors:  --ESRD on HD  --Cachexia  --DM2  --Anemia of CKD    Thank you,  Abby NOVOAN, RN  Clinical   Connect via [employer]  Options provided:   -- Mild protein calorie malnutrition   -- Moderate protein calorie malnutrition   -- Severe protein calorie malnutrition   -- Other explanation-please specify, (please specify other explanation)      Query created by: Abby Esparza on 2/12/2025 2:56 PM    RESPONSE TEXT:    Mild protein calorie malnutrition          Electronically signed by:  MEAGAN MADDOX MD 2/13/2025 6:47 AM